# Patient Record
Sex: MALE | URBAN - NONMETROPOLITAN AREA
[De-identification: names, ages, dates, MRNs, and addresses within clinical notes are randomized per-mention and may not be internally consistent; named-entity substitution may affect disease eponyms.]

---

## 2017-01-05 ENCOUNTER — APPOINTMENT (RX ONLY)
Dept: URBAN - NONMETROPOLITAN AREA CLINIC 4 | Facility: CLINIC | Age: 67
Setting detail: DERMATOLOGY
End: 2017-01-05

## 2017-01-05 DIAGNOSIS — L82.1 OTHER SEBORRHEIC KERATOSIS: ICD-10-CM

## 2017-01-05 DIAGNOSIS — B35.3 TINEA PEDIS: ICD-10-CM

## 2017-01-05 DIAGNOSIS — B35.1 TINEA UNGUIUM: ICD-10-CM

## 2017-01-05 DIAGNOSIS — L81.4 OTHER MELANIN HYPERPIGMENTATION: ICD-10-CM

## 2017-01-05 PROBLEM — L57.8 OTHER SKIN CHANGES DUE TO CHRONIC EXPOSURE TO NONIONIZING RADIATION: Status: ACTIVE | Noted: 2017-01-05

## 2017-01-05 PROCEDURE — ? COUNSELING

## 2017-01-05 PROCEDURE — ? PRESCRIPTION

## 2017-01-05 PROCEDURE — ? NAIL CLIPPING FOR PAS

## 2017-01-05 PROCEDURE — ? ORDER TESTS

## 2017-01-05 PROCEDURE — 99213 OFFICE O/P EST LOW 20 MIN: CPT

## 2017-01-05 PROCEDURE — 11755 BIOPSY NAIL UNIT: CPT

## 2017-01-05 RX ORDER — AMMONIUM LACTATE 12 G/100G
CREAM TOPICAL
Qty: 1 | Refills: 0 | Status: ERX | COMMUNITY
Start: 2017-01-05

## 2017-01-05 RX ORDER — KETOCONAZOLE 20 MG/G
CREAM TOPICAL
Qty: 1 | Refills: 0 | Status: ERX | COMMUNITY
Start: 2017-01-05

## 2017-01-05 RX ADMIN — AMMONIUM LACTATE: 12 CREAM TOPICAL at 23:47

## 2017-01-05 RX ADMIN — KETOCONAZOLE: 20 CREAM TOPICAL at 22:52

## 2017-01-05 ASSESSMENT — LOCATION DETAILED DESCRIPTION DERM
LOCATION DETAILED: RIGHT PLANTAR FOREFOOT OVERLYING 3RD METATARSAL
LOCATION DETAILED: LEFT PROXIMAL LATERAL POSTERIOR UPPER ARM
LOCATION DETAILED: RIGHT PROXIMAL DORSAL FOREARM
LOCATION DETAILED: RIGHT POSTERIOR SHOULDER
LOCATION DETAILED: RIGHT PROXIMAL POSTERIOR UPPER ARM
LOCATION DETAILED: LEFT GREAT TOENAIL
LOCATION DETAILED: RIGHT ULNAR DORSAL HAND
LOCATION DETAILED: LEFT MEDIAL PLANTAR MIDFOOT
LOCATION DETAILED: RIGHT DISTAL LATERAL POSTERIOR UPPER ARM
LOCATION DETAILED: RIGHT DISTAL DORSAL FOREARM
LOCATION DETAILED: RIGHT MEDIAL PLANTAR MIDFOOT
LOCATION DETAILED: LEFT VENTRAL PROXIMAL FOREARM
LOCATION DETAILED: LEFT PROXIMAL POSTERIOR UPPER ARM
LOCATION DETAILED: LEFT PROXIMAL DORSAL FOREARM
LOCATION DETAILED: LEFT GREAT TOENAIL
LOCATION DETAILED: LEFT DISTAL DORSAL FOREARM
LOCATION DETAILED: SUPERIOR LUMBAR SPINE
LOCATION DETAILED: LEFT PROXIMAL POSTERIOR UPPER ARM
LOCATION DETAILED: LEFT MEDIAL PLANTAR HEEL
LOCATION DETAILED: RIGHT MEDIAL UPPER BACK
LOCATION DETAILED: LEFT SUPERIOR MEDIAL UPPER BACK
LOCATION DETAILED: LEFT PROXIMAL RADIAL DORSAL FOREARM
LOCATION DETAILED: SUPERIOR LUMBAR SPINE
LOCATION DETAILED: LEFT LATERAL PLANTAR HEEL
LOCATION DETAILED: RIGHT INSTEP
LOCATION DETAILED: RIGHT DISTAL POSTERIOR UPPER ARM
LOCATION DETAILED: LEFT RADIAL DORSAL HAND

## 2017-01-05 ASSESSMENT — LOCATION SIMPLE DESCRIPTION DERM
LOCATION SIMPLE: LEFT UPPER ARM
LOCATION SIMPLE: RIGHT UPPER ARM
LOCATION SIMPLE: RIGHT UPPER BACK
LOCATION SIMPLE: LOWER BACK
LOCATION SIMPLE: LEFT FOREARM
LOCATION SIMPLE: LOWER BACK
LOCATION SIMPLE: LEFT UPPER BACK
LOCATION SIMPLE: LEFT HAND
LOCATION SIMPLE: LEFT GREAT TOE
LOCATION SIMPLE: RIGHT PLANTAR SURFACE
LOCATION SIMPLE: LEFT PLANTAR SURFACE
LOCATION SIMPLE: RIGHT HAND
LOCATION SIMPLE: RIGHT UPPER ARM
LOCATION SIMPLE: LEFT GREAT TOE
LOCATION SIMPLE: LEFT UPPER ARM
LOCATION SIMPLE: RIGHT SHOULDER
LOCATION SIMPLE: RIGHT FOREARM

## 2017-01-05 ASSESSMENT — LOCATION ZONE DERM
LOCATION ZONE: ARM
LOCATION ZONE: TOENAIL
LOCATION ZONE: ARM
LOCATION ZONE: HAND
LOCATION ZONE: TRUNK
LOCATION ZONE: TRUNK
LOCATION ZONE: FEET
LOCATION ZONE: TOENAIL

## 2017-01-05 NOTE — PROCEDURE: NAIL CLIPPING FOR PAS
Add [id number] To Bill?: No
Billing Type: [employer]
Body Location Override (Optional - Billing Will Still Be Based On Selected Body Map Location If Applicable): Left Great ToeNail
Detail Level: Detailed
Lab Facility: [address] [place] [address]
Lab: [id number]

## 2017-01-05 NOTE — PROCEDURE: NAIL CLIPPING FOR PAS
Add [id number] To Bill?: No
Body Location Override (Optional - Billing Will Still Be Based On Selected Body Map Location If Applicable): Left Great ToeNail
Billing Type: [employer]
Lab Facility: [address] [place] [address]
Detail Level: Detailed
Lab: [id number]

## 2017-01-17 ENCOUNTER — APPOINTMENT (RX ONLY)
Dept: URBAN - METROPOLITAN AREA CLINIC 58 | Facility: CLINIC | Age: 67
Setting detail: DERMATOLOGY
End: 2017-01-17

## 2017-01-17 ENCOUNTER — RX ONLY (OUTPATIENT)
Age: 67
Setting detail: RX ONLY
End: 2017-01-17

## 2017-01-17 DIAGNOSIS — B35.1 TINEA UNGUIUM: ICD-10-CM

## 2017-01-17 PROCEDURE — ? PRESCRIPTION

## 2017-01-17 PROCEDURE — 99213 OFFICE O/P EST LOW 20 MIN: CPT

## 2017-01-17 PROCEDURE — ? COUNSELING

## 2017-01-17 RX ORDER — AMOXICILLIN 500 MG/1
CAPSULE ORAL
Qty: 3 | Refills: 0 | Status: ERX | COMMUNITY
Start: 2017-01-17

## 2017-01-17 RX ORDER — TERBINAFINE HYDROCHLORIDE 250 MG/1
TABLET ORAL QD
Qty: 30 | Refills: 0 | Status: ERX | COMMUNITY
Start: 2017-01-17

## 2017-01-17 RX ADMIN — TERBINAFINE HYDROCHLORIDE: 250 TABLET ORAL at 23:07

## 2017-01-17 ASSESSMENT — LOCATION DETAILED DESCRIPTION DERM
LOCATION DETAILED: RIGHT DORSAL GREAT TOE
LOCATION DETAILED: LEFT DORSAL GREAT TOE

## 2017-01-17 ASSESSMENT — LOCATION SIMPLE DESCRIPTION DERM
LOCATION SIMPLE: RIGHT GREAT TOE
LOCATION SIMPLE: LEFT GREAT TOE

## 2017-01-17 ASSESSMENT — LOCATION ZONE DERM: LOCATION ZONE: TOE

## 2017-02-13 ENCOUNTER — RX ONLY (OUTPATIENT)
Age: 67
Setting detail: RX ONLY
End: 2017-02-13

## 2017-02-13 ENCOUNTER — APPOINTMENT (RX ONLY)
Dept: URBAN - NONMETROPOLITAN AREA CLINIC 4 | Facility: CLINIC | Age: 67
Setting detail: DERMATOLOGY
End: 2017-02-13

## 2017-02-13 DIAGNOSIS — B35.1 TINEA UNGUIUM: ICD-10-CM

## 2017-02-13 PROCEDURE — ? COUNSELING

## 2017-02-13 PROCEDURE — 99213 OFFICE O/P EST LOW 20 MIN: CPT

## 2017-02-13 RX ORDER — TERBINAFINE HYDROCHLORIDE 250 MG/1
TABLET ORAL QD
Qty: 30 | Refills: 0 | Status: ERX

## 2017-02-13 ASSESSMENT — LOCATION DETAILED DESCRIPTION DERM
LOCATION DETAILED: RIGHT DORSAL GREAT TOE
LOCATION DETAILED: LEFT GREAT TOENAIL

## 2017-02-13 ASSESSMENT — LOCATION ZONE DERM
LOCATION ZONE: TOE
LOCATION ZONE: TOENAIL

## 2017-02-13 ASSESSMENT — LOCATION SIMPLE DESCRIPTION DERM
LOCATION SIMPLE: LEFT GREAT TOE
LOCATION SIMPLE: RIGHT GREAT TOE

## 2017-03-14 ENCOUNTER — APPOINTMENT (RX ONLY)
Dept: URBAN - NONMETROPOLITAN AREA CLINIC 4 | Facility: CLINIC | Age: 67
Setting detail: DERMATOLOGY
End: 2017-03-14

## 2017-03-14 DIAGNOSIS — B35.1 TINEA UNGUIUM: ICD-10-CM

## 2017-03-14 DIAGNOSIS — D485 NEOPLASM OF UNCERTAIN BEHAVIOR OF SKIN: ICD-10-CM

## 2017-03-14 PROBLEM — D48.5 NEOPLASM OF UNCERTAIN BEHAVIOR OF SKIN: Status: ACTIVE | Noted: 2017-03-14

## 2017-03-14 PROCEDURE — ? COUNSELING

## 2017-03-14 PROCEDURE — 99213 OFFICE O/P EST LOW 20 MIN: CPT | Mod: 25

## 2017-03-14 PROCEDURE — 69100 BIOPSY OF EXTERNAL EAR: CPT

## 2017-03-14 PROCEDURE — ? PRESCRIPTION

## 2017-03-14 PROCEDURE — ? BIOPSY BY SHAVE METHOD

## 2017-03-14 RX ORDER — TERBINAFINE HYDROCHLORIDE 250 MG/1
TABLET ORAL QD
Qty: 30 | Refills: 0 | Status: ERX

## 2017-03-14 ASSESSMENT — LOCATION ZONE DERM
LOCATION ZONE: EAR
LOCATION ZONE: TOENAIL
LOCATION ZONE: EAR

## 2017-03-14 ASSESSMENT — LOCATION DETAILED DESCRIPTION DERM
LOCATION DETAILED: RIGHT SUPERIOR CRUS OF ANTIHELIX
LOCATION DETAILED: LEFT GREAT TOENAIL
LOCATION DETAILED: RIGHT SUPERIOR CRUS OF ANTIHELIX

## 2017-03-14 ASSESSMENT — LOCATION SIMPLE DESCRIPTION DERM
LOCATION SIMPLE: LEFT GREAT TOE
LOCATION SIMPLE: RIGHT EAR
LOCATION SIMPLE: RIGHT EAR

## 2017-03-14 NOTE — PROCEDURE: BIOPSY BY SHAVE METHOD
Path Notes (To The Dermatopathologist): Size: 0.3cm R/O BCC vs SCC
Electrodesiccation And Curettage Text: The wound bed was treated with electrodesiccation and curettage after the biopsy was performed.
Biopsy Type: H and E
Notification Instructions: Patient will be notified of biopsy results. However, patient instructed to call the office if not contacted within 2 weeks.
Billing Type: [employer]
Biopsy Method: 15 blade
Lab: [id number]
Anesthesia Type: 2% lidocaine without epinephrine
Electrodesiccation Text: The wound bed was treated with electrodesiccation after the biopsy was performed.
Bill 60118 For Specimen Handling/Conveyance To Laboratory?: no
Silver Nitrate Text: The wound bed was treated with silver nitrate after the biopsy was performed.
Hemostasis: Electrocautery
Post-Care Instructions: I reviewed with the patient in detail post-care instructions. Patient is to keep the biopsy site dry overnight, and then apply bacitracin twice daily until healed. Patient may apply hydrogen peroxide soaks to remove any crusting.
Consent: Written consent was obtained and risks were reviewed including but not limited to scarring, infection, bleeding, scabbing, incomplete removal, nerve damage and allergy to anesthesia.
Wound Care: Bacitracin
Body Location Override (Optional - Billing Will Still Be Based On Selected Body Map Location If Applicable): Right ear
Additional Anesthesia Volume In Cc (Will Not Render If 0): 0
Cryotherapy Text: The wound bed was treated with cryotherapy after the biopsy was performed.
Lab Facility: [address] [place] [address]
Size Of Lesion In Cm: 0.3
Anesthesia Volume In Cc (Will Not Render If 0): 0.5
Type Of Destruction Used: Curettage
Detail Level: Detailed
Render Post-Care Instructions In Note?: yes
Dressing: bandage

## 2017-04-11 ENCOUNTER — APPOINTMENT (RX ONLY)
Dept: URBAN - NONMETROPOLITAN AREA CLINIC 4 | Facility: CLINIC | Age: 67
Setting detail: DERMATOLOGY
End: 2017-04-11

## 2017-04-11 DIAGNOSIS — B35.1 TINEA UNGUIUM: ICD-10-CM

## 2017-04-11 DIAGNOSIS — L57.0 ACTINIC KERATOSIS: ICD-10-CM

## 2017-04-11 PROCEDURE — ? COUNSELING

## 2017-04-11 PROCEDURE — ? PRESCRIPTION

## 2017-04-11 PROCEDURE — ? LIQUID NITROGEN (CM)

## 2017-04-11 PROCEDURE — 99213 OFFICE O/P EST LOW 20 MIN: CPT | Mod: 25

## 2017-04-11 PROCEDURE — 17000 DESTRUCT PREMALG LESION: CPT

## 2017-04-11 ASSESSMENT — LOCATION ZONE DERM: LOCATION ZONE: EAR

## 2017-04-11 ASSESSMENT — LOCATION SIMPLE DESCRIPTION DERM: LOCATION SIMPLE: RIGHT EAR

## 2017-04-11 ASSESSMENT — LOCATION DETAILED DESCRIPTION DERM: LOCATION DETAILED: RIGHT SUPERIOR CRUS OF ANTIHELIX

## 2017-04-11 NOTE — PROCEDURE: LIQUID NITROGEN
Number Of Freeze-Thaw Cycles: 3 freeze-thaw cycles
Consent: The patient's consent was obtained including but not limited to risks of crusting, scabbing, blistering, scarring, darker or lighter pigmentary change, recurrence, incomplete removal and infection.
Detail Level: Detailed
Duration Of Freeze Thaw-Cycle (Seconds): 1
Post-Care Instructions: I reviewed with the patient in detail post-care instructions. Patient is to wear sunprotection, and avoid picking at any of the treated lesions. Pt may apply Vaseline to crusted or scabbing areas.
X Size Of Lesion In Cm (Optional): 0

## 2017-04-21 RX ORDER — TERBINAFINE HYDROCHLORIDE 250 MG/1
TABLET ORAL QD
Qty: 30 | Refills: 0 | Status: ERX

## 2022-06-29 ENCOUNTER — APPOINTMENT (OUTPATIENT)
Dept: RADIOLOGY | Facility: HOSPITAL | Age: 72
DRG: 982 | End: 2022-06-29
Payer: MEDICARE

## 2022-06-29 ENCOUNTER — HOSPITAL ENCOUNTER (INPATIENT)
Facility: HOSPITAL | Age: 72
LOS: 7 days | Discharge: NURSING, REHAB OR LONG-TERM CARE | DRG: 982 | End: 2022-07-06
Attending: EMERGENCY MEDICINE | Admitting: SURGERY
Payer: MEDICARE

## 2022-06-29 ENCOUNTER — APPOINTMENT (OUTPATIENT)
Dept: CT IMAGING | Facility: HOSPITAL | Age: 72
DRG: 982 | End: 2022-06-29
Payer: MEDICARE

## 2022-06-29 DIAGNOSIS — S42.123A CLOSED FRACTURE OF ACROMION: ICD-10-CM

## 2022-06-29 DIAGNOSIS — S22.49XA MULTIPLE RIB FRACTURES: Primary | ICD-10-CM

## 2022-06-29 DIAGNOSIS — S42.109A SCAPULA FRACTURE: ICD-10-CM

## 2022-06-29 DIAGNOSIS — S42.002A CLOSED DISPLACED FRACTURE OF LEFT CLAVICLE: ICD-10-CM

## 2022-06-29 LAB
ABO GROUP (TYPE) IN BLOOD: NORMAL
ALBUMIN SERPL-MCNC: 3.9 G/DL (ref 3.5–5.3)
ALP SERPL-CCNC: 70 U/L (ref 45–115)
ALT SERPL-CCNC: 19 U/L (ref 7–52)
ANION GAP SERPL CALC-SCNC: 8 MMOL/L (ref 3–11)
ANTIBODY SCREEN: NORMAL
AST SERPL-CCNC: 32 U/L
BACTERIA #/AREA URNS AUTO: ABNORMAL /HPF
BASOPHILS # BLD AUTO: 0.1 10*3/UL
BASOPHILS NFR BLD AUTO: 0.5 % (ref 0–2)
BILIRUB SERPL-MCNC: 0.56 MG/DL (ref 0.2–1.4)
BILIRUB UR QL STRIP.AUTO: NEGATIVE
BUN SERPL-MCNC: 20 MG/DL (ref 7–25)
CALCIUM ALBUM COR SERPL-MCNC: 8.9 MG/DL (ref 8.6–10.3)
CALCIUM SERPL-MCNC: 8.8 MG/DL (ref 8.6–10.3)
CHLORIDE SERPL-SCNC: 103 MMOL/L (ref 98–107)
CLARITY UR: CLEAR
CO2 SERPL-SCNC: 24 MMOL/L (ref 21–32)
COLOR UR: YELLOW
CREAT SERPL-MCNC: 1.33 MG/DL (ref 0.7–1.3)
D AG BLD QL: NORMAL
EOSINOPHIL # BLD AUTO: 0.1 10*3/UL
EOSINOPHIL NFR BLD AUTO: 0.3 % (ref 0–3)
ERYTHROCYTE [DISTWIDTH] IN BLOOD BY AUTOMATED COUNT: 15.1 % (ref 11.5–15)
GFR SERPL CREATININE-BSD FRML MDRD: 56 ML/MIN/1.73M*2
GLUCOSE SERPL-MCNC: 151 MG/DL (ref 70–105)
GLUCOSE UR STRIP.AUTO-MCNC: NEGATIVE MG/DL
HCT VFR BLD AUTO: 40.4 % (ref 38–50)
HGB BLD-MCNC: 13.5 G/DL (ref 13.2–17.2)
HGB UR QL STRIP.AUTO: NEGATIVE
KETONES UR STRIP.AUTO-MCNC: ABNORMAL MG/DL
LEUKOCYTE ESTERASE UR QL STRIP: NEGATIVE
LIPASE SERPL-CCNC: 32 U/L (ref 11–82)
LYMPHOCYTES # BLD AUTO: 0.6 10*3/UL
LYMPHOCYTES NFR BLD AUTO: 3.7 % (ref 15–47)
MAGNESIUM SERPL-MCNC: 2 MG/DL (ref 1.8–2.4)
MCH RBC QN AUTO: 27.3 PG (ref 29–34)
MCHC RBC AUTO-ENTMCNC: 33.4 G/DL (ref 32–36)
MCV RBC AUTO: 81.7 FL (ref 82–97)
MONOCYTES # BLD AUTO: 1.2 10*3/UL
MONOCYTES NFR BLD AUTO: 7.4 % (ref 5–13)
NEUTROPHILS # BLD AUTO: 14.2 10*3/UL
NEUTROPHILS NFR BLD AUTO: 88.1 % (ref 46–70)
NITRITE UR QL STRIP.AUTO: NEGATIVE
PH UR STRIP.AUTO: 5 PH
PLATELET # BLD AUTO: 178 10*3/UL (ref 130–350)
PMV BLD AUTO: 7.3 FL (ref 6.9–10.8)
POTASSIUM SERPL-SCNC: 4.6 MMOL/L (ref 3.5–5.1)
PROT SERPL-MCNC: 6.6 G/DL (ref 6–8.3)
PROT UR STRIP.AUTO-MCNC: 30 MG/DL
RBC # BLD AUTO: 4.95 10*6/ΜL (ref 4.1–5.8)
RBC #/AREA URNS AUTO: ABNORMAL /HPF
SECOND/CONFIRMATORY ABORH PERFORMED: NORMAL
SODIUM SERPL-SCNC: 135 MMOL/L (ref 135–145)
SP GR UR STRIP.AUTO: 1.06 (ref 1–1.03)
SQUAMOUS #/AREA URNS AUTO: ABNORMAL /HPF
TROPONIN I SERPL-MCNC: 7.5 PG/ML
UROBILINOGEN UR STRIP.AUTO-MCNC: <2 E.U./DL
WBC # BLD AUTO: 16.1 10*3/UL (ref 3.7–9.6)
WBC #/AREA URNS AUTO: ABNORMAL /HPF

## 2022-06-29 PROCEDURE — 73000 X-RAY EXAM OF COLLAR BONE: CPT | Mod: LT

## 2022-06-29 PROCEDURE — 6370000100 HC RX 637 (ALT 250 FOR IP): Performed by: SURGERY

## 2022-06-29 PROCEDURE — 70450 CT HEAD/BRAIN W/O DYE: CPT | Mod: MG

## 2022-06-29 PROCEDURE — 96374 THER/PROPH/DIAG INJ IV PUSH: CPT

## 2022-06-29 PROCEDURE — 2550000100 HC RX 255: Performed by: EMERGENCY MEDICINE

## 2022-06-29 PROCEDURE — 99222 1ST HOSP IP/OBS MODERATE 55: CPT | Performed by: SURGERY

## 2022-06-29 PROCEDURE — 6360000200 HC RX 636 W HCPCS (ALT 250 FOR IP): Performed by: NURSE PRACTITIONER

## 2022-06-29 PROCEDURE — 96376 TX/PRO/DX INJ SAME DRUG ADON: CPT

## 2022-06-29 PROCEDURE — 73080 X-RAY EXAM OF ELBOW: CPT | Mod: LT

## 2022-06-29 PROCEDURE — 83690 ASSAY OF LIPASE: CPT | Performed by: EMERGENCY MEDICINE

## 2022-06-29 PROCEDURE — 85025 COMPLETE CBC W/AUTO DIFF WBC: CPT | Performed by: EMERGENCY MEDICINE

## 2022-06-29 PROCEDURE — 6360000200 HC RX 636 W HCPCS (ALT 250 FOR IP): Performed by: EMERGENCY MEDICINE

## 2022-06-29 PROCEDURE — 83735 ASSAY OF MAGNESIUM: CPT | Performed by: NURSE PRACTITIONER

## 2022-06-29 PROCEDURE — 80053 COMPREHEN METABOLIC PANEL: CPT | Performed by: EMERGENCY MEDICINE

## 2022-06-29 PROCEDURE — 99291 CRITICAL CARE FIRST HOUR: CPT | Performed by: EMERGENCY MEDICINE

## 2022-06-29 PROCEDURE — 84100 ASSAY OF PHOSPHORUS: CPT | Performed by: NURSE PRACTITIONER

## 2022-06-29 PROCEDURE — G1004 CDSM NDSC: HCPCS

## 2022-06-29 PROCEDURE — 81001 URINALYSIS AUTO W/SCOPE: CPT | Performed by: EMERGENCY MEDICINE

## 2022-06-29 PROCEDURE — 2580000300 HC RX 258: Performed by: NURSE PRACTITIONER

## 2022-06-29 PROCEDURE — 83735 ASSAY OF MAGNESIUM: CPT | Performed by: EMERGENCY MEDICINE

## 2022-06-29 PROCEDURE — 36415 COLL VENOUS BLD VENIPUNCTURE: CPT | Performed by: EMERGENCY MEDICINE

## 2022-06-29 PROCEDURE — 71045 X-RAY EXAM CHEST 1 VIEW: CPT

## 2022-06-29 PROCEDURE — 73060 X-RAY EXAM OF HUMERUS: CPT | Mod: LT

## 2022-06-29 PROCEDURE — 84484 ASSAY OF TROPONIN QUANT: CPT | Performed by: EMERGENCY MEDICINE

## 2022-06-29 PROCEDURE — 6370000100 HC RX 637 (ALT 250 FOR IP): Performed by: NURSE PRACTITIONER

## 2022-06-29 PROCEDURE — 74177 CT ABD & PELVIS W/CONTRAST: CPT | Mod: MG

## 2022-06-29 PROCEDURE — 2590000100 HC RX 259: Performed by: NURSE PRACTITIONER

## 2022-06-29 PROCEDURE — 94660 CPAP INITIATION&MGMT: CPT

## 2022-06-29 PROCEDURE — 86885 COOMBS TEST INDIRECT QUAL: CPT

## 2022-06-29 PROCEDURE — (BLANK) HC ROOM PRIVATE

## 2022-06-29 RX ORDER — ONDANSETRON HYDROCHLORIDE 2 MG/ML
4 INJECTION, SOLUTION INTRAVENOUS EVERY 6 HOURS PRN
Status: DISCONTINUED | OUTPATIENT
Start: 2022-06-29 | End: 2022-07-06 | Stop reason: HOSPADM

## 2022-06-29 RX ORDER — TADALAFIL 10 MG/1
10 TABLET ORAL DAILY PRN
COMMUNITY
Start: 2022-03-16

## 2022-06-29 RX ORDER — IBUPROFEN 600 MG/1
600 TABLET ORAL EVERY 6 HOURS
Status: CANCELLED | OUTPATIENT
Start: 2022-06-29

## 2022-06-29 RX ORDER — TESTOSTERONE ENANTHATE 200 MG/ML
200 VIAL (ML) INTRAMUSCULAR
COMMUNITY

## 2022-06-29 RX ORDER — FLUTICASONE PROPIONATE 50 MCG
2 SPRAY, SUSPENSION (ML) NASAL DAILY
COMMUNITY
Start: 2021-09-22

## 2022-06-29 RX ORDER — ALUMINUM HYDROXIDE, MAGNESIUM HYDROXIDE, AND SIMETHICONE 1200; 120; 1200 MG/30ML; MG/30ML; MG/30ML
30 SUSPENSION ORAL 3 TIMES DAILY PRN
Status: DISCONTINUED | OUTPATIENT
Start: 2022-06-29 | End: 2022-07-06 | Stop reason: HOSPADM

## 2022-06-29 RX ORDER — ONDANSETRON 4 MG/1
4 TABLET, FILM COATED ORAL EVERY 6 HOURS PRN
Status: DISCONTINUED | OUTPATIENT
Start: 2022-06-29 | End: 2022-07-06 | Stop reason: HOSPADM

## 2022-06-29 RX ORDER — MELOXICAM 15 MG/1
15 TABLET ORAL DAILY
COMMUNITY
Start: 2022-04-11 | End: 2022-07-06 | Stop reason: HOSPADM

## 2022-06-29 RX ORDER — ENOXAPARIN SODIUM 100 MG/ML
40 INJECTION SUBCUTANEOUS
Status: DISCONTINUED | OUTPATIENT
Start: 2022-06-29 | End: 2022-07-06 | Stop reason: HOSPADM

## 2022-06-29 RX ORDER — ACETAMINOPHEN 325 MG/1
325-650 TABLET ORAL EVERY 4 HOURS PRN
Status: DISCONTINUED | OUTPATIENT
Start: 2022-06-29 | End: 2022-07-06 | Stop reason: HOSPADM

## 2022-06-29 RX ORDER — TRAZODONE HYDROCHLORIDE 100 MG/1
100 TABLET ORAL
COMMUNITY
Start: 2022-05-04

## 2022-06-29 RX ORDER — DEXTROSE MONOHYDRATE, SODIUM CHLORIDE, AND POTASSIUM CHLORIDE 50; 1.49; 9 G/1000ML; G/1000ML; G/1000ML
100 INJECTION, SOLUTION INTRAVENOUS CONTINUOUS
Status: DISCONTINUED | OUTPATIENT
Start: 2022-06-29 | End: 2022-07-01

## 2022-06-29 RX ORDER — OXYCODONE HYDROCHLORIDE 5 MG/1
5-10 TABLET ORAL EVERY 4 HOURS PRN
Status: DISCONTINUED | OUTPATIENT
Start: 2022-06-29 | End: 2022-07-06 | Stop reason: HOSPADM

## 2022-06-29 RX ORDER — POLYETHYLENE GLYCOL (3350) 17 G/17G
17 POWDER, FOR SOLUTION ORAL DAILY
Status: DISCONTINUED | OUTPATIENT
Start: 2022-06-29 | End: 2022-07-06 | Stop reason: HOSPADM

## 2022-06-29 RX ORDER — BUPROPION HYDROCHLORIDE 150 MG/1
150 TABLET ORAL
COMMUNITY
Start: 2021-06-29

## 2022-06-29 RX ORDER — SODIUM CHLORIDE 0.9 % (FLUSH) 0.9 %
3 SYRINGE (ML) INJECTION AS NEEDED
Status: DISCONTINUED | OUTPATIENT
Start: 2022-06-29 | End: 2022-07-06 | Stop reason: HOSPADM

## 2022-06-29 RX ORDER — ATORVASTATIN CALCIUM 20 MG/1
0.5 TABLET, FILM COATED ORAL NIGHTLY
COMMUNITY
Start: 2021-06-29

## 2022-06-29 RX ORDER — HYDROMORPHONE HYDROCHLORIDE 1 MG/ML
.4-.6 INJECTION, SOLUTION INTRAMUSCULAR; INTRAVENOUS; SUBCUTANEOUS
Status: DISCONTINUED | OUTPATIENT
Start: 2022-06-29 | End: 2022-07-06 | Stop reason: HOSPADM

## 2022-06-29 RX ORDER — IBUPROFEN 600 MG/1
600 TABLET ORAL
Status: DISCONTINUED | OUTPATIENT
Start: 2022-06-29 | End: 2022-07-06 | Stop reason: HOSPADM

## 2022-06-29 RX ORDER — SPIRONOLACTONE 50 MG/1
50 TABLET, FILM COATED ORAL DAILY
COMMUNITY
Start: 2022-06-17

## 2022-06-29 RX ORDER — IOPAMIDOL 755 MG/ML
115 INJECTION, SOLUTION INTRAVASCULAR ONCE
Status: COMPLETED | OUTPATIENT
Start: 2022-06-29 | End: 2022-06-29

## 2022-06-29 RX ORDER — OMEPRAZOLE 40 MG/1
40 CAPSULE, DELAYED RELEASE ORAL DAILY
COMMUNITY
Start: 2022-06-16

## 2022-06-29 RX ORDER — AMOXICILLIN 250 MG
1 CAPSULE ORAL 2 TIMES DAILY
Status: DISCONTINUED | OUTPATIENT
Start: 2022-06-29 | End: 2022-07-06 | Stop reason: HOSPADM

## 2022-06-29 RX ORDER — ZOLMITRIPTAN 5 MG/1
5 TABLET, FILM COATED ORAL ONCE AS NEEDED
COMMUNITY
Start: 2021-09-22

## 2022-06-29 RX ORDER — DICYCLOMINE HYDROCHLORIDE 10 MG/1
20 CAPSULE ORAL 3 TIMES DAILY
COMMUNITY
Start: 2022-06-08

## 2022-06-29 RX ORDER — PRAZOSIN HYDROCHLORIDE 2 MG/1
6 CAPSULE ORAL NIGHTLY
COMMUNITY
Start: 2021-06-29

## 2022-06-29 RX ORDER — VENLAFAXINE HYDROCHLORIDE 75 MG/1
225 CAPSULE, EXTENDED RELEASE ORAL DAILY
COMMUNITY
Start: 2021-06-29

## 2022-06-29 RX ORDER — LIDOCAINE 50 MG/G
2 PATCH TOPICAL DAILY
Status: DISCONTINUED | OUTPATIENT
Start: 2022-06-29 | End: 2022-07-06 | Stop reason: HOSPADM

## 2022-06-29 RX ORDER — GABAPENTIN 300 MG/1
300 CAPSULE ORAL 3 TIMES DAILY
Status: DISCONTINUED | OUTPATIENT
Start: 2022-06-29 | End: 2022-07-06 | Stop reason: HOSPADM

## 2022-06-29 RX ORDER — FENTANYL CITRATE/PF 50 MCG/ML
50 PLASTIC BAG, INJECTION (ML) INTRAVENOUS
Status: DISCONTINUED | OUTPATIENT
Start: 2022-06-29 | End: 2022-07-06 | Stop reason: HOSPADM

## 2022-06-29 RX ORDER — TRAZODONE HYDROCHLORIDE 100 MG/1
100 TABLET ORAL NIGHTLY
Status: DISCONTINUED | OUTPATIENT
Start: 2022-06-29 | End: 2022-07-01

## 2022-06-29 RX ORDER — CYCLOBENZAPRINE HCL 10 MG
10 TABLET ORAL 3 TIMES DAILY
Status: DISCONTINUED | OUTPATIENT
Start: 2022-06-29 | End: 2022-07-03

## 2022-06-29 RX ORDER — ATENOLOL 50 MG/1
50 TABLET ORAL DAILY
COMMUNITY
Start: 2022-01-03

## 2022-06-29 RX ORDER — MUPIROCIN 20 MG/G
1 OINTMENT TOPICAL 2 TIMES DAILY
COMMUNITY
Start: 2022-06-08 | End: 2022-07-12 | Stop reason: HOSPADM

## 2022-06-29 RX ADMIN — GABAPENTIN 300 MG: 300 CAPSULE ORAL at 14:18

## 2022-06-29 RX ADMIN — POTASSIUM CHLORIDE, DEXTROSE MONOHYDRATE AND SODIUM CHLORIDE 100 ML/HR: 150; 5; 900 INJECTION, SOLUTION INTRAVENOUS at 21:57

## 2022-06-29 RX ADMIN — LIDOCAINE 2 PATCH: 50 PATCH TOPICAL at 14:18

## 2022-06-29 RX ADMIN — FENTANYL CITRATE 50 MCG: 0.05 INJECTION, SOLUTION INTRAMUSCULAR; INTRAVENOUS at 10:50

## 2022-06-29 RX ADMIN — OXYCODONE HYDROCHLORIDE 10 MG: 5 TABLET ORAL at 14:18

## 2022-06-29 RX ADMIN — ONDANSETRON 4 MG: 2 INJECTION INTRAMUSCULAR; INTRAVENOUS at 10:14

## 2022-06-29 RX ADMIN — TRAZODONE HYDROCHLORIDE 100 MG: 100 TABLET ORAL at 21:14

## 2022-06-29 RX ADMIN — SENNOSIDES AND DOCUSATE SODIUM 1 TABLET: 50; 8.6 TABLET ORAL at 14:18

## 2022-06-29 RX ADMIN — CYCLOBENZAPRINE 10 MG: 10 TABLET, FILM COATED ORAL at 21:14

## 2022-06-29 RX ADMIN — ACETAMINOPHEN 650 MG: 325 TABLET ORAL at 19:41

## 2022-06-29 RX ADMIN — IOPAMIDOL 115 ML: 755 INJECTION, SOLUTION INTRAVENOUS at 10:35

## 2022-06-29 RX ADMIN — GABAPENTIN 300 MG: 300 CAPSULE ORAL at 21:14

## 2022-06-29 RX ADMIN — POTASSIUM CHLORIDE, DEXTROSE MONOHYDRATE AND SODIUM CHLORIDE 100 ML/HR: 150; 5; 900 INJECTION, SOLUTION INTRAVENOUS at 10:44

## 2022-06-29 RX ADMIN — OXYCODONE HYDROCHLORIDE 10 MG: 5 TABLET ORAL at 17:40

## 2022-06-29 RX ADMIN — HYDROMORPHONE HYDROCHLORIDE 0.5 MG: 1 INJECTION, SOLUTION INTRAMUSCULAR; INTRAVENOUS; SUBCUTANEOUS at 10:15

## 2022-06-29 RX ADMIN — CYCLOBENZAPRINE 10 MG: 10 TABLET, FILM COATED ORAL at 14:18

## 2022-06-29 RX ADMIN — IBUPROFEN 600 MG: 600 TABLET ORAL at 17:40

## 2022-06-29 RX ADMIN — FENTANYL CITRATE 50 MCG: 0.05 INJECTION, SOLUTION INTRAMUSCULAR; INTRAVENOUS at 21:15

## 2022-06-29 RX ADMIN — FENTANYL CITRATE 50 MCG: 0.05 INJECTION, SOLUTION INTRAMUSCULAR; INTRAVENOUS at 12:48

## 2022-06-29 RX ADMIN — SENNOSIDES AND DOCUSATE SODIUM 1 TABLET: 50; 8.6 TABLET ORAL at 21:14

## 2022-06-29 RX ADMIN — IBUPROFEN 600 MG: 600 TABLET ORAL at 21:14

## 2022-06-29 ASSESSMENT — ENCOUNTER SYMPTOMS
CHEST TIGHTNESS: 1
SHORTNESS OF BREATH: 1
ARTHRALGIAS: 1
NUMBNESS: 0
DIAPHORESIS: 1
HEADACHES: 0
CONFUSION: 0
FEVER: 0

## 2022-06-29 ASSESSMENT — ACTIVITIES OF DAILY LIVING (ADL)
ASSISTIVE_DEVICE: EYEGLASSES
PATIENT'S MEMORY ADEQUATE TO SAFELY COMPLETE DAILY ACTIVITIES?: YES
ADEQUATE_TO_COMPLETE_ADL: YES
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
## 2022-06-29 NOTE — PLAN OF CARE
Problem: Neurosensory - Adult  Goal: Achieves stable or improved neurological status  Description: INTERVENTIONS  1. Assess for and report changes in neurological status  2. Initiate measures to prevent increased intracranial pressure  3. Maintain blood pressure and fluid volume within ordered parameters to optimize cerebral perfusion and minimize risk of hemorrhage  4. Monitor temperature, glucose, and sodium. Initiate appropriate interventions as ordered  Flowsheets (Taken 6/29/2022 1222)  Achieves stable or improved neurological status:   Assess for and report changes in neurological status   Initiate measures to prevent increased intracranial pressure   Maintain blood pressure and fluid volume within ordered parameters to optimize cerebral perfusion and minimize risk of hemorrhage   Monitor temperature, glucose, and sodium. Initiate appropriate interventions as ordered  Goal: Achieves maximal functionality and self care  Description: INTERVENTIONS  1. Monitor swallowing and airway patency with patient fatigue and changes in neurological status  2. Encourage and assist patient to increase activity and self care with guidance from PT/OT  3. Encourage visually impaired, hearing impaired and aphasic patients to use assistive/communication devices  Outcome: Progressing  Flowsheets (Taken 6/29/2022 1222)  Achieves maximal functionality and self care:   Monitor swallowing and airway patency with patient fatigue and changes in neurological status   Encourage and assist patient to increase activity and self care with guidance from PT/OT   Encourage visually impaired, hearing impaired and aphasic patients to use assistive/communication devices
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
## 2022-06-29 NOTE — MEDICATION HISTORY SPECIALIST NOTES
[facility] ED-[id number]    CSN: [id number]  : [id number]    Information sources:  EPIC  Complete Dispense Report  Go Reconcile  [facility]    Allergies verified.    Patient interviewed in person who provided all history. Patient verified medications and provided last doses. Verified with [facility].    New medications added:  All    Profile was checked for  medications. Reviewed previously removed medication history section for removed medications and reasoning. If applicable reasoning will be listed in discrepancies.     Discrepancies:  Pt states not taking meloxicam     See  for medication resources.  Outside records permanent

## 2022-06-30 ENCOUNTER — APPOINTMENT (OUTPATIENT)
Dept: FLUOROSCOPY | Facility: HOSPITAL | Age: 72
DRG: 982 | End: 2022-06-30
Payer: MEDICARE

## 2022-06-30 ENCOUNTER — ANESTHESIA EVENT (OUTPATIENT)
Dept: OPERATING ROOM | Facility: HOSPITAL | Age: 72
DRG: 982 | End: 2022-06-30
Payer: MEDICARE

## 2022-06-30 ENCOUNTER — APPOINTMENT (OUTPATIENT)
Dept: RADIOLOGY | Facility: HOSPITAL | Age: 72
DRG: 982 | End: 2022-06-30
Payer: MEDICARE

## 2022-06-30 ENCOUNTER — ANESTHESIA (OUTPATIENT)
Dept: OPERATING ROOM | Facility: HOSPITAL | Age: 72
DRG: 982 | End: 2022-06-30
Payer: MEDICARE

## 2022-06-30 LAB
ANION GAP SERPL CALC-SCNC: 8 MMOL/L (ref 3–11)
BASOPHILS # BLD AUTO: 0 10*3/UL
BASOPHILS NFR BLD AUTO: 0.2 % (ref 0–2)
BUN SERPL-MCNC: 22 MG/DL (ref 7–25)
CALCIUM SERPL-MCNC: 8.6 MG/DL (ref 8.6–10.3)
CHLORIDE SERPL-SCNC: 107 MMOL/L (ref 98–107)
CO2 SERPL-SCNC: 26 MMOL/L (ref 21–32)
CREAT SERPL-MCNC: 1.42 MG/DL (ref 0.7–1.3)
EOSINOPHIL # BLD AUTO: 0.1 10*3/UL
EOSINOPHIL NFR BLD AUTO: 1.1 % (ref 0–3)
ERYTHROCYTE [DISTWIDTH] IN BLOOD BY AUTOMATED COUNT: 15.2 % (ref 11.5–15)
GFR SERPL CREATININE-BSD FRML MDRD: 52 ML/MIN/1.73M*2
GLUCOSE SERPL-MCNC: 130 MG/DL (ref 70–105)
HCT VFR BLD AUTO: 39.3 % (ref 38–50)
HGB BLD-MCNC: 12.9 G/DL (ref 13.2–17.2)
LYMPHOCYTES # BLD AUTO: 1.1 10*3/UL
LYMPHOCYTES NFR BLD AUTO: 13 % (ref 15–47)
MAGNESIUM SERPL-MCNC: 2.1 MG/DL (ref 1.8–2.4)
MAGNESIUM SERPL-MCNC: 2.2 MG/DL (ref 1.8–2.4)
MCH RBC QN AUTO: 27.4 PG (ref 29–34)
MCHC RBC AUTO-ENTMCNC: 33 G/DL (ref 32–36)
MCV RBC AUTO: 83.2 FL (ref 82–97)
MONOCYTES # BLD AUTO: 1.3 10*3/UL
MONOCYTES NFR BLD AUTO: 15.7 % (ref 5–13)
NEUTROPHILS # BLD AUTO: 5.9 10*3/UL
NEUTROPHILS NFR BLD AUTO: 70 % (ref 46–70)
PHOSPHATE SERPL-MCNC: 3.5 MG/DL (ref 2.5–4.9)
PLATELET # BLD AUTO: 157 10*3/UL (ref 130–350)
PMV BLD AUTO: 7.2 FL (ref 6.9–10.8)
POTASSIUM SERPL-SCNC: 4.4 MMOL/L (ref 3.5–5.1)
RBC # BLD AUTO: 4.72 10*6/ΜL (ref 4.1–5.8)
SODIUM SERPL-SCNC: 141 MMOL/L (ref 135–145)
WBC # BLD AUTO: 8.4 10*3/UL (ref 3.7–9.6)

## 2022-06-30 PROCEDURE — (BLANK) HC GENERAL ANESTHESIA FACILITY CHARGE 1ST 15 MIN: Performed by: ORTHOPAEDIC SURGERY

## 2022-06-30 PROCEDURE — 85025 COMPLETE CBC W/AUTO DIFF WBC: CPT | Performed by: NURSE PRACTITIONER

## 2022-06-30 PROCEDURE — 71045 X-RAY EXAM CHEST 1 VIEW: CPT

## 2022-06-30 PROCEDURE — (BLANK) HC OR LEVEL 3 PROC EACH ADDITIONAL MIN: Performed by: ORTHOPAEDIC SURGERY

## 2022-06-30 PROCEDURE — 2590000100 HC RX 259: Performed by: NURSE PRACTITIONER

## 2022-06-30 PROCEDURE — 0PSB04Z REPOSITION LEFT CLAVICLE WITH INTERNAL FIXATION DEVICE, OPEN APPROACH: ICD-10-PCS | Performed by: ORTHOPAEDIC SURGERY

## 2022-06-30 PROCEDURE — 2580000300 HC RX 258: Performed by: NURSE PRACTITIONER

## 2022-06-30 PROCEDURE — 36415 COLL VENOUS BLD VENIPUNCTURE: CPT | Performed by: NURSE PRACTITIONER

## 2022-06-30 PROCEDURE — (BLANK) HC OR LEVEL 3 PROC 1ST 15MIN: Performed by: ORTHOPAEDIC SURGERY

## 2022-06-30 PROCEDURE — 6360000200 HC RX 636 W HCPCS (ALT 250 FOR IP): Performed by: PHYSICIAN ASSISTANT

## 2022-06-30 PROCEDURE — 6360000200 HC RX 636 W HCPCS (ALT 250 FOR IP): Performed by: ANESTHESIOLOGY

## 2022-06-30 PROCEDURE — 6360000200 HC RX 636 W HCPCS (ALT 250 FOR IP): Performed by: EMERGENCY MEDICINE

## 2022-06-30 PROCEDURE — 2500000200 HC RX 250 WO HCPCS: Performed by: ORTHOPAEDIC SURGERY

## 2022-06-30 PROCEDURE — 73000 X-RAY EXAM OF COLLAR BONE: CPT | Mod: LT

## 2022-06-30 PROCEDURE — 2580000300 HC RX 258: Performed by: PHYSICIAN ASSISTANT

## 2022-06-30 PROCEDURE — 6360000200 HC RX 636 W HCPCS (ALT 250 FOR IP): Performed by: NURSE ANESTHETIST, CERTIFIED REGISTERED

## 2022-06-30 PROCEDURE — 99100 ANES PT EXTEME AGE<1 YR&>70: CPT | Performed by: NURSE ANESTHETIST, CERTIFIED REGISTERED

## 2022-06-30 PROCEDURE — 2500000200 HC RX 250 WO HCPCS: Performed by: NURSE ANESTHETIST, CERTIFIED REGISTERED

## 2022-06-30 PROCEDURE — (BLANK) HC GENERAL ANESTHESIA FACILITY CHARGE EACH ADDITIONAL MIN: Performed by: ORTHOPAEDIC SURGERY

## 2022-06-30 PROCEDURE — 76000 FLUOROSCOPY <1 HR PHYS/QHP: CPT | Mod: NO READ

## 2022-06-30 PROCEDURE — 99232 SBSQ HOSP IP/OBS MODERATE 35: CPT | Performed by: NURSE PRACTITIONER

## 2022-06-30 PROCEDURE — 6370000100 HC RX 637 (ALT 250 FOR IP): Performed by: NURSE PRACTITIONER

## 2022-06-30 PROCEDURE — 94660 CPAP INITIATION&MGMT: CPT

## 2022-06-30 PROCEDURE — 00450 ANES PX CLAV&SCAPULA NOS: CPT | Performed by: NURSE ANESTHETIST, CERTIFIED REGISTERED

## 2022-06-30 PROCEDURE — 83735 ASSAY OF MAGNESIUM: CPT | Performed by: NURSE PRACTITIONER

## 2022-06-30 PROCEDURE — 80048 BASIC METABOLIC PNL TOTAL CA: CPT | Performed by: NURSE PRACTITIONER

## 2022-06-30 PROCEDURE — 2580000300 HC RX 258: Performed by: ANESTHESIOLOGY

## 2022-06-30 PROCEDURE — 2720000000 HC SUPP 272 WO HCPCS: Performed by: ORTHOPAEDIC SURGERY

## 2022-06-30 PROCEDURE — (BLANK) HC ROOM PRIVATE

## 2022-06-30 PROCEDURE — C1713 ANCHOR/SCREW BN/BN,TIS/BN: HCPCS | Performed by: ORTHOPAEDIC SURGERY

## 2022-06-30 PROCEDURE — (BLANK) HC RECOVERY PHASE-1 1ST  HOUR ACUITY LEVEL 3: Performed by: ORTHOPAEDIC SURGERY

## 2022-06-30 PROCEDURE — 6370000100 HC RX 637 (ALT 250 FOR IP): Performed by: SURGERY

## 2022-06-30 PROCEDURE — 2500000200 HC RX 250 WO HCPCS: Performed by: ANESTHESIOLOGY

## 2022-06-30 DEVICE — SCREW VA 2.7X18MM LCK SLF TAP T8 STARDRIVE RECESS: Type: IMPLANTABLE DEVICE | Site: CLAVICLE | Status: FUNCTIONAL

## 2022-06-30 DEVICE — IMPLANTABLE DEVICE: Type: IMPLANTABLE DEVICE | Site: CLAVICLE | Status: FUNCTIONAL

## 2022-06-30 DEVICE — SCREW VA 2.7X14MM LCK SLF TAP: Type: IMPLANTABLE DEVICE | Site: CLAVICLE | Status: FUNCTIONAL

## 2022-06-30 DEVICE — SCREW METAPHYSEAL 2.7X16MM ST SLF TAP T8 STARDRIVE RECESS: Type: IMPLANTABLE DEVICE | Site: CLAVICLE | Status: FUNCTIONAL

## 2022-06-30 DEVICE — SCREW VA 2.7X16MM LCK SLF TAP: Type: IMPLANTABLE DEVICE | Site: CLAVICLE | Status: FUNCTIONAL

## 2022-06-30 DEVICE — SCREW METAPHYSEAL 2.7X14MM ST SLF TAP T8 STARDRIVE RECESS: Type: IMPLANTABLE DEVICE | Site: CLAVICLE | Status: FUNCTIONAL

## 2022-06-30 RX ORDER — NEOSTIGMINE METHYLSULFATE 1 MG/ML
INJECTION INTRAVENOUS AS NEEDED
Status: DISCONTINUED | OUTPATIENT
Start: 2022-06-30 | End: 2022-06-30 | Stop reason: SURG

## 2022-06-30 RX ORDER — FENTANYL CITRATE/PF 50 MCG/ML
PLASTIC BAG, INJECTION (ML) INTRAVENOUS AS NEEDED
Status: DISCONTINUED | OUTPATIENT
Start: 2022-06-30 | End: 2022-06-30 | Stop reason: SURG

## 2022-06-30 RX ORDER — DEXAMETHASONE SODIUM PHOSPHATE 4 MG/ML
4 INJECTION, SOLUTION INTRA-ARTICULAR; INTRALESIONAL; INTRAMUSCULAR; INTRAVENOUS; SOFT TISSUE ONCE AS NEEDED
Status: DISCONTINUED | OUTPATIENT
Start: 2022-06-30 | End: 2022-06-30 | Stop reason: HOSPADM

## 2022-06-30 RX ORDER — SODIUM CHLORIDE, SODIUM LACTATE, POTASSIUM CHLORIDE, CALCIUM CHLORIDE 600; 310; 30; 20 MG/100ML; MG/100ML; MG/100ML; MG/100ML
100 INJECTION, SOLUTION INTRAVENOUS CONTINUOUS
Status: DISCONTINUED | OUTPATIENT
Start: 2022-06-30 | End: 2022-07-01

## 2022-06-30 RX ORDER — GLYCOPYRROLATE 0.2 MG/ML
INJECTION INTRAMUSCULAR; INTRAVENOUS AS NEEDED
Status: DISCONTINUED | OUTPATIENT
Start: 2022-06-30 | End: 2022-06-30 | Stop reason: SURG

## 2022-06-30 RX ORDER — ROCURONIUM BROMIDE 10 MG/ML
INJECTION, SOLUTION INTRAVENOUS AS NEEDED
Status: DISCONTINUED | OUTPATIENT
Start: 2022-06-30 | End: 2022-06-30 | Stop reason: SURG

## 2022-06-30 RX ORDER — KETAMINE HCL IN 0.9 % NACL 50 MG/5 ML
SYRINGE (ML) INTRAVENOUS AS NEEDED
Status: DISCONTINUED | OUTPATIENT
Start: 2022-06-30 | End: 2022-06-30 | Stop reason: SURG

## 2022-06-30 RX ORDER — PROPOFOL 10 MG/ML
INJECTION, EMULSION INTRAVENOUS AS NEEDED
Status: DISCONTINUED | OUTPATIENT
Start: 2022-06-30 | End: 2022-06-30 | Stop reason: SURG

## 2022-06-30 RX ORDER — METOPROLOL TARTRATE 1 MG/ML
1 INJECTION, SOLUTION INTRAVENOUS EVERY 5 MIN PRN
Status: DISCONTINUED | OUTPATIENT
Start: 2022-06-30 | End: 2022-06-30 | Stop reason: HOSPADM

## 2022-06-30 RX ORDER — CEFAZOLIN SODIUM 1 G/3ML
INJECTION, POWDER, FOR SOLUTION INTRAMUSCULAR; INTRAVENOUS AS NEEDED
Status: DISCONTINUED | OUTPATIENT
Start: 2022-06-30 | End: 2022-06-30 | Stop reason: SURG

## 2022-06-30 RX ORDER — DIPHENHYDRAMINE HYDROCHLORIDE 50 MG/ML
25 INJECTION INTRAMUSCULAR; INTRAVENOUS ONCE AS NEEDED
Status: DISCONTINUED | OUTPATIENT
Start: 2022-06-30 | End: 2022-06-30 | Stop reason: HOSPADM

## 2022-06-30 RX ORDER — TRANEXAMIC ACID 100 MG/ML
INJECTION, SOLUTION INTRAVENOUS AS NEEDED
Status: DISCONTINUED | OUTPATIENT
Start: 2022-06-30 | End: 2022-06-30 | Stop reason: SURG

## 2022-06-30 RX ORDER — ONDANSETRON HYDROCHLORIDE 2 MG/ML
4 INJECTION, SOLUTION INTRAVENOUS ONCE
Status: COMPLETED | OUTPATIENT
Start: 2022-06-30 | End: 2022-06-30

## 2022-06-30 RX ORDER — HYDROMORPHONE HYDROCHLORIDE 1 MG/ML
0.5 INJECTION, SOLUTION INTRAMUSCULAR; INTRAVENOUS; SUBCUTANEOUS EVERY 5 MIN PRN
Status: DISCONTINUED | OUTPATIENT
Start: 2022-06-30 | End: 2022-06-30 | Stop reason: HOSPADM

## 2022-06-30 RX ORDER — SODIUM CHLORIDE 0.9 G/100ML
INJECTION, SOLUTION IRRIGATION AS NEEDED
Status: DISCONTINUED | OUTPATIENT
Start: 2022-06-30 | End: 2022-06-30 | Stop reason: HOSPADM

## 2022-06-30 RX ORDER — ONDANSETRON HYDROCHLORIDE 2 MG/ML
4 INJECTION, SOLUTION INTRAVENOUS ONCE AS NEEDED
Status: DISCONTINUED | OUTPATIENT
Start: 2022-06-30 | End: 2022-06-30 | Stop reason: HOSPADM

## 2022-06-30 RX ORDER — DEXAMETHASONE SODIUM PHOSPHATE 4 MG/ML
4 INJECTION, SOLUTION INTRA-ARTICULAR; INTRALESIONAL; INTRAMUSCULAR; INTRAVENOUS; SOFT TISSUE ONCE
Status: COMPLETED | OUTPATIENT
Start: 2022-06-30 | End: 2022-06-30

## 2022-06-30 RX ADMIN — GABAPENTIN 300 MG: 300 CAPSULE ORAL at 21:47

## 2022-06-30 RX ADMIN — METOPROLOL TARTRATE 1 MG: 5 INJECTION INTRAVENOUS at 18:04

## 2022-06-30 RX ADMIN — NOREPINEPHRINE BITARTRATE 8 MCG: 1 INJECTION, SOLUTION, CONCENTRATE INTRAVENOUS at 16:15

## 2022-06-30 RX ADMIN — DEXMEDETOMIDINE HYDROCHLORIDE 4 MCG: 4 INJECTION, SOLUTION INTRAVENOUS at 15:46

## 2022-06-30 RX ADMIN — Medication 10 MG: at 15:46

## 2022-06-30 RX ADMIN — METOPROLOL TARTRATE 1 MG: 5 INJECTION INTRAVENOUS at 18:33

## 2022-06-30 RX ADMIN — PROPOFOL 150 MG: 10 INJECTION, EMULSION INTRAVENOUS at 14:53

## 2022-06-30 RX ADMIN — POTASSIUM CHLORIDE, DEXTROSE MONOHYDRATE AND SODIUM CHLORIDE 100 ML/HR: 150; 5; 900 INJECTION, SOLUTION INTRAVENOUS at 20:32

## 2022-06-30 RX ADMIN — DEXMEDETOMIDINE HYDROCHLORIDE 4 MCG: 4 INJECTION, SOLUTION INTRAVENOUS at 16:03

## 2022-06-30 RX ADMIN — NEOSTIGMINE METHYLSULFATE 2 MG: 1 INJECTION, SOLUTION INTRAVENOUS at 17:12

## 2022-06-30 RX ADMIN — ROCURONIUM BROMIDE 50 MG: 10 INJECTION INTRAVENOUS at 14:53

## 2022-06-30 RX ADMIN — DEXAMETHASONE SODIUM PHOSPHATE 4 MG: 4 INJECTION, SOLUTION INTRAMUSCULAR; INTRAVENOUS at 14:36

## 2022-06-30 RX ADMIN — TRANEXAMIC ACID 1000 MG: 100 INJECTION, SOLUTION INTRAVENOUS at 15:49

## 2022-06-30 RX ADMIN — IBUPROFEN 600 MG: 600 TABLET ORAL at 21:47

## 2022-06-30 RX ADMIN — FENTANYL CITRATE 50 MCG: 50 INJECTION, SOLUTION INTRAMUSCULAR; INTRAVENOUS at 14:48

## 2022-06-30 RX ADMIN — FENTANYL CITRATE 50 MCG: 50 INJECTION, SOLUTION INTRAMUSCULAR; INTRAVENOUS at 15:33

## 2022-06-30 RX ADMIN — FENTANYL CITRATE 50 MCG: 50 INJECTION, SOLUTION INTRAMUSCULAR; INTRAVENOUS at 16:03

## 2022-06-30 RX ADMIN — CEFAZOLIN 2000 MG: 2 INJECTION, POWDER, FOR SOLUTION INTRAMUSCULAR; INTRAVENOUS at 23:58

## 2022-06-30 RX ADMIN — Medication 10 MG: at 15:23

## 2022-06-30 RX ADMIN — CEFAZOLIN SODIUM 2 G: 1 INJECTION, POWDER, FOR SOLUTION INTRAMUSCULAR; INTRAVENOUS at 15:23

## 2022-06-30 RX ADMIN — FENTANYL CITRATE 50 MCG: 50 INJECTION, SOLUTION INTRAMUSCULAR; INTRAVENOUS at 14:53

## 2022-06-30 RX ADMIN — NOREPINEPHRINE BITARTRATE 8 MCG: 1 INJECTION, SOLUTION, CONCENTRATE INTRAVENOUS at 15:21

## 2022-06-30 RX ADMIN — POTASSIUM CHLORIDE, DEXTROSE MONOHYDRATE AND SODIUM CHLORIDE 100 ML/HR: 150; 5; 900 INJECTION, SOLUTION INTRAVENOUS at 09:55

## 2022-06-30 RX ADMIN — FENTANYL CITRATE 50 MCG: 50 INJECTION, SOLUTION INTRAMUSCULAR; INTRAVENOUS at 15:46

## 2022-06-30 RX ADMIN — METOPROLOL TARTRATE 1 MG: 5 INJECTION INTRAVENOUS at 18:26

## 2022-06-30 RX ADMIN — DEXMEDETOMIDINE HYDROCHLORIDE 4 MCG: 4 INJECTION, SOLUTION INTRAVENOUS at 14:48

## 2022-06-30 RX ADMIN — Medication 10 MG: at 16:03

## 2022-06-30 RX ADMIN — ONDANSETRON 4 MG: 2 INJECTION INTRAMUSCULAR; INTRAVENOUS at 14:36

## 2022-06-30 RX ADMIN — TRANEXAMIC ACID 1000 MG: 100 INJECTION, SOLUTION INTRAVENOUS at 17:09

## 2022-06-30 RX ADMIN — FENTANYL CITRATE 50 MCG: 0.05 INJECTION, SOLUTION INTRAMUSCULAR; INTRAVENOUS at 08:10

## 2022-06-30 RX ADMIN — Medication 10 MG: at 14:53

## 2022-06-30 RX ADMIN — FENTANYL CITRATE 50 MCG: 0.05 INJECTION, SOLUTION INTRAMUSCULAR; INTRAVENOUS at 12:32

## 2022-06-30 RX ADMIN — GLYCOPYRROLATE 0.2 MG: 0.2 INJECTION, SOLUTION INTRAMUSCULAR; INTRAVENOUS at 17:12

## 2022-06-30 RX ADMIN — Medication 10 MG: at 15:33

## 2022-06-30 RX ADMIN — SENNOSIDES AND DOCUSATE SODIUM 1 TABLET: 50; 8.6 TABLET ORAL at 21:46

## 2022-06-30 RX ADMIN — SODIUM CHLORIDE, POTASSIUM CHLORIDE, SODIUM LACTATE AND CALCIUM CHLORIDE 100 ML/HR: 600; 310; 30; 20 INJECTION, SOLUTION INTRAVENOUS at 14:36

## 2022-06-30 RX ADMIN — CYCLOBENZAPRINE 10 MG: 10 TABLET, FILM COATED ORAL at 21:46

## 2022-06-30 RX ADMIN — TRAZODONE HYDROCHLORIDE 100 MG: 100 TABLET ORAL at 21:46

## 2022-06-30 ASSESSMENT — ENCOUNTER SYMPTOMS: EXERCISE TOLERANCE: GOOD (>7 METS)

## 2022-06-30 NOTE — NURSING END OF SHIFT
Nursing End of Shift Summary:    Patient: Ralph Giron  MRN: [id number]  : 1950, Age: 72 y.o.    Location: MAIN OR/NONE    Nursing Goals  Clinical Goals for the Shift: pain mgmt, monitor vs/cms, npo/or today    Narrative Summary of Progress Toward Clinical Goals:  Patient aox4 throughout shift, severe pain at times/controlled well w/ iv fentanyl, went to OR for repair of clavicle fx    Barriers to Goals/Nursing Concerns:  none    New Patient or Family Concerns/Issues:  none    Shift Summary:   Significant Events & Communications to Providers (last 12 hours)     Last 5 Values    No documentation.             Oxygen Usage (last 12 hours)     Last 5 Values    No documentation.             Mobility (last 12 hours)     Last 5 Values     Mobility     Row Name 22 3263    Patient Position Supine    Turning Turns self    Anti-Embolism Devices Applied Bilateral;AE calf pump                    Urethral Catheter     Active Urethral Catheter     None            Active Lines     Active Central venous catheter / Peripherally inserted central catheter / Implantable Port / Hemodialysis catheter / Midline Catheter     None              Infusing Medications   Medication Dose Last Rate   • LR  100 mL/hr 100 mL/hr (22 1436)   • dextrose 5 % and sodium chloride 0.9% with KCL  100 mL/hr 100 mL/hr (22 0955)     PRN Medications   Medication Dose Last Admin   • ondansetron  4 mg     • diphenhydrAMINE  25 mg     • dexamethasone  4 mg     • meperidine  12.5 mg     • metoprolol  1 mg     • HYDROmorphone  0.5 mg     • [MAR Hold] fentaNYL citrate (PF)  50 mcg 50 mcg at 22 1232   • [MAR Hold] sodium chloride  3 mL     • [MAR Hold] acetaminophen  325-650 mg 650 mg at 22 1941   • [MAR Hold] oxyCODONE  5-10 mg 10 mg at 22 1740   • [MAR Hold] HYDROmorphone  0.4-0.6 mg 0.5 mg at 22 1015   • [MAR Hold] ondansetron  4 mg      Or   • [MAR Hold] ondansetron  4 mg 4 mg at 22 1014   • [MAR Hold] alum-mag  hydroxide-simeth  30 mL       _________________________  JOSE MINOR RN  06/30/22 5:56 PM

## 2022-06-30 NOTE — PLAN OF CARE
Problem: Neurosensory - Adult  Goal: Achieves stable or improved neurological status  Description: INTERVENTIONS  1. Assess for and report changes in neurological status  2. Initiate measures to prevent increased intracranial pressure  3. Maintain blood pressure and fluid volume within ordered parameters to optimize cerebral perfusion and minimize risk of hemorrhage  4. Monitor temperature, glucose, and sodium. Initiate appropriate interventions as ordered  Outcome: Progressing  Goal: Achieves maximal functionality and self care  Description: INTERVENTIONS  1. Monitor swallowing and airway patency with patient fatigue and changes in neurological status  2. Encourage and assist patient to increase activity and self care with guidance from PT/OT  3. Encourage visually impaired, hearing impaired and aphasic patients to use assistive/communication devices  Outcome: Progressing     Problem: Musculoskeletal - Adult  Goal: Return mobility to safest level of function  Description: INTERVENTIONS:  1. Assess patient stability and activity tolerance for standing, transferring and ambulating w/ or w/o assistive devices  2. Assist with transfers and ambulation using safe practices  3. Ensure adequate protection for wounds/incisions during mobilization  4. Obtain PT/OT and other consults as needed  5. Apply Continuous Passive Motion per order to increase flexion toward goal  6. Instruct patient/family in ordered activity level  Outcome: Progressing  Goal: Maintain proper alignment of affected body part  Description: INTERVENTIONS:  1. Support and protect limb and body alignment per provider order  2. Instruct and reinforce with patient and family use of appropriate assistive device and precautions (e.g. spinal or hip dislocation precautions)  Outcome: Progressing  Goal: Return ADL status to a safe level of function  Description: INTERVENTIONS:  1. Assess patient's ADL deficits and provide assistive devices as needed  2. Obtain  PT/OT consults as needed  3. Assist and instruct patient to increase activity and self care  Outcome: Progressing     Problem: Safety Adult - Fall  Goal: Free from fall injury  Description: INTERVENTIONS:    Inpatient - Please reference Cares/Safety Flowsheet under [place] Fall Risk for interventions.  Pediatrics - Please reference Peds Daily Cares/Safety Flowsheet under [facility] Pediatric Fall Assessment Fall Bundle for interventions  LD/OB - Please reference OB Shift Screening Flowsheet under OB Fall Risk for interventions.  Outcome: Progressing     Problem: Knowledge Deficit  Goal: Patient/family/caregiver demonstrates understanding of disease process, treatment plan, medications, and discharge instructions  Description: INTERVENTIONS:   1. Complete learning assessment and assess knowledge base  2. Provide teaching at level of understanding   3. Provide teaching via preferred learning methods  Outcome: Progressing     Problem: Potential for Compromised Skin Integrity  Goal: Skin Integrity is Maintained or Improved  Description: INTERVENTIONS:  1. Assess and monitor skin integrity  2. Collaborate with interdisciplinary team and initiate plans and interventions as needed  3. Alternate a full bath with partial baths for elderly   4. Monitor patient's hygiene practices   5. Collaborate with wound, ostomy, and continence nurse  Outcome: Progressing  Goal: Nutritional status is improving  Description: INTERVENTIONS:  1. Monitor and assess patient for malnutrition (ex- brittle hair, bruises, dry skin, pale skin and conjunctiva, muscle wasting, smooth red tongue, and disorientation)  2. Monitor patient's weight and dietary intake as ordered or per policy  3. Determine patient's food preferences and provide high-protein, high-caloric foods as appropriate  4. Assist patient with eating   5. Allow adequate time for meals   6. Encourage patient to take dietary supplement as ordered   7. Collaborate with dietitian  8. Include  patient/family/caregiver in decisions related to nutrition  Outcome: Progressing  Goal: MOBILITY IS MAINTAINED OR IMPROVED  Description: INTERVENTIONS  1. Collaborate with interdisciplinary team and initiate plan and interventions as ordered (PT/OT)  2. Encourage ambulation  3. Up to chair for meals  4. Monitor for signs of deconditioning  Outcome: Progressing

## 2022-06-30 NOTE — ANESTHESIA PREPROCEDURE EVALUATION
"Pre-Procedure Assessment    Patient: Ralph Giron, male, 72 y.o.    Ht Readings from Last 1 Encounters:   06/29/22 1.753 m (5' 9\")     Wt Readings from Last 1 Encounters:   06/29/22 115.1 kg (253 lb 12 oz)       Last Vitals  BP      Temp     Pulse     Resp      SpO2      Pain Score         Problem list reviewed and Medical history reviewed    No history of anesthetic complications:    No family history of anesthetic complications:      Airway   Mallampati: III  TM distance: >3 FB  Neck ROM: full      Dental      Pulmonary     breath sounds clear to auscultation    ROS comment: XR chest portable 1 view  Order: [id number]  Status: Final result    Visible to patient: No (scheduled for 7/3/2022  1:13 PM)    Next appt: None    0 Result Notes    Details      Reading Physician Reading Date Result Priority  Jaime Soto MD  [phone] 6/30/2022     Narrative & Impression  Exam:   DX CHEST PORTABLE 1 VW 06/30/2022 .     Clinical History:    pneumothorax     Comparison(s):  6/29/2022     Findings:   Low volumes. Cardiomegaly. Redemonstration left-sided displaced rib fractures without pneumothorax. Some basilar atelectasis present.        IMPRESSION:  1. no significant change.      Cardiovascular - negative ROS  Exercise tolerance: good (>7 METS)    Rhythm: regular  Rate: normal    Mental Status/Neuro/Psych - negative ROS   Pt is alert.        GI/Hepatic/Renal - negative ROS     Endo/Other      Comments: IMPRESSION:  1. Mild displaced left clavicle fractures.  2. Additional fractures involving the left scapula, acromion, and left posterior ribs.  Abdominal           Social History     Tobacco Use   • Smoking status: Not on file   • Smokeless tobacco: Not on file   Substance Use Topics   • Alcohol use: Not on file      Hematology   WBC   Date Value Ref Range Status   06/30/2022 8.4 3.7 - 9.6 10*3/uL Final     RBC   Date Value Ref Range Status   06/30/2022 4.72 4.10 - 5.80 10*6/µL Final     MCV   Date Value Ref Range Status "   06/30/2022 83.2 82.0 - 97.0 fL Final     Hemoglobin   Date Value Ref Range Status   06/30/2022 12.9 (L) 13.2 - 17.2 g/dL Final     Hematocrit   Date Value Ref Range Status   06/30/2022 39.3 38.0 - 50.0 % Final     Platelets   Date Value Ref Range Status   06/30/2022 157 130 - 350 10*3/uL Final      Coagulation No results found for: PT, APTT, INR   General Chemistry   Calcium   Date Value Ref Range Status   06/30/2022 8.6 8.6 - 10.3 mg/dL Final     BUN   Date Value Ref Range Status   06/30/2022 22 7 - 25 mg/dL Final     Creatinine   Date Value Ref Range Status   06/30/2022 1.42 (H) 0.70 - 1.30 mg/dL Final     Glucose   Date Value Ref Range Status   06/30/2022 130 (H) 70 - 105 mg/dL Final     Sodium   Date Value Ref Range Status   06/30/2022 141 135 - 145 mmol/L Final     Potassium   Date Value Ref Range Status   06/30/2022 4.4 3.5 - 5.1 MMOL/L Final     Magnesium   Date Value Ref Range Status   06/30/2022 2.2 1.8 - 2.4 mg/dL Final     CO2   Date Value Ref Range Status   06/30/2022 26 21 - 32 mmol/L Final     Chloride   Date Value Ref Range Status   06/30/2022 107 98 - 107 mmol/L Final     Anesthesia Plan    ASA 3   NPO status reviewed: > 6 hours    General         Induction: intravenous   Airway Planning: oral ET tube    Additional Comments: Consented for a block is needed      Plan for postoperative opioid use.    Anesthetic plan and risks discussed with patient.  Use of blood products discussed with patient who.     Plan discussed with CRNA.

## 2022-06-30 NOTE — PLAN OF CARE
Problem: Neurosensory - Adult  Goal: Achieves stable or improved neurological status  Description: INTERVENTIONS  1. Assess for and report changes in neurological status  2. Initiate measures to prevent increased intracranial pressure  3. Maintain blood pressure and fluid volume within ordered parameters to optimize cerebral perfusion and minimize risk of hemorrhage  4. Monitor temperature, glucose, and sodium. Initiate appropriate interventions as ordered  Outcome: Progressing  Goal: Achieves maximal functionality and self care  Description: INTERVENTIONS  1. Monitor swallowing and airway patency with patient fatigue and changes in neurological status  2. Encourage and assist patient to increase activity and self care with guidance from PT/OT  3. Encourage visually impaired, hearing impaired and aphasic patients to use assistive/communication devices  Outcome: Progressing     Problem: Musculoskeletal - Adult  Goal: Return mobility to safest level of function  Description: INTERVENTIONS:  1. Assess patient stability and activity tolerance for standing, transferring and ambulating w/ or w/o assistive devices  2. Assist with transfers and ambulation using safe practices  3. Ensure adequate protection for wounds/incisions during mobilization  4. Obtain PT/OT and other consults as needed  5. Apply Continuous Passive Motion per order to increase flexion toward goal  6. Instruct patient/family in ordered activity level  Outcome: Progressing  Goal: Maintain proper alignment of affected body part  Description: INTERVENTIONS:  1. Support and protect limb and body alignment per provider order  2. Instruct and reinforce with patient and family use of appropriate assistive device and precautions (e.g. spinal or hip dislocation precautions)  Outcome: Progressing  Goal: Return ADL status to a safe level of function  Description: INTERVENTIONS:  1. Assess patient's ADL deficits and provide assistive devices as needed  2. Obtain  PT/OT consults as needed  3. Assist and instruct patient to increase activity and self care  Outcome: Progressing     Problem: Safety Adult - Fall  Goal: Free from fall injury  Description: INTERVENTIONS:    Inpatient - Please reference Cares/Safety Flowsheet under [facility] Fall Risk for interventions.  Pediatrics - Please reference Peds Daily Cares/Safety Flowsheet under [facility] Pediatric Fall Assessment Fall Bundle for interventions  LD/OB - Please reference OB Shift Screening Flowsheet under OB Fall Risk for interventions.  Outcome: Progressing     Problem: Knowledge Deficit  Goal: Patient/family/caregiver demonstrates understanding of disease process, treatment plan, medications, and discharge instructions  Description: INTERVENTIONS:   1. Complete learning assessment and assess knowledge base  2. Provide teaching at level of understanding   3. Provide teaching via preferred learning methods  Outcome: Progressing     Problem: Potential for Compromised Skin Integrity  Goal: Skin Integrity is Maintained or Improved  Description: INTERVENTIONS:  1. Assess and monitor skin integrity  2. Collaborate with interdisciplinary team and initiate plans and interventions as needed  3. Alternate a full bath with partial baths for elderly   4. Monitor patient's hygiene practices   5. Collaborate with wound, ostomy, and continence nurse  Outcome: Progressing  Goal: Nutritional status is improving  Description: INTERVENTIONS:  1. Monitor and assess patient for malnutrition (ex- brittle hair, bruises, dry skin, pale skin and conjunctiva, muscle wasting, smooth red tongue, and disorientation)  2. Monitor patient's weight and dietary intake as ordered or per policy  3. Determine patient's food preferences and provide high-protein, high-caloric foods as appropriate  4. Assist patient with eating   5. Allow adequate time for meals   6. Encourage patient to take dietary supplement as ordered   7. Collaborate with dietitian  8. Include  patient/family/caregiver in decisions related to nutrition  Outcome: Progressing  Goal: MOBILITY IS MAINTAINED OR IMPROVED  Description: INTERVENTIONS  1. Collaborate with interdisciplinary team and initiate plan and interventions as ordered (PT/OT)  2. Encourage ambulation  3. Up to chair for meals  4. Monitor for signs of deconditioning  Outcome: Progressing     Problem: Urinary Incontinence  Goal: Perineal skin integrity is maintained or improved  Description: INTERVENTIONS:  1. Assess genitourinary system, perineal skin, labs (urinalysis), and history of incontinence to include past management, aggravating, and alleviating factors  2. Collaborate with interdisciplinary team including wound, ostomy, and continence nurse and initiate plans and interventions as needed  4. Consider urine containment device  5. Apply skin protectant   6. Develop skin care regimen  7. Provide privacy when changing patient's incontinence device to maintain their dignity  Outcome: Progressing
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
## 2022-06-30 NOTE — NURSING END OF SHIFT
Nursing End of Shift Summary:    Patient: Ralph Giron  MRN: [id number]  : 1950, Age: 72 y.o.    Location: [address] [place] [address]    Nursing Goals  Clinical Goals for the Shift: make comfortable    Narrative Summary of Progress Toward Clinical Goals:  Safety and comfort maintained. Pain controlled.Neuro status remained unchanged. NPO since midnight.    Barriers to Goals/Nursing Concerns:  Surgery    New Patient or Family Concerns/Issues:  No    Shift Summary:   Significant Events & Communications to Providers (last 12 hours)     Last 5 Values     Row Name 22 1754                   Provider Notification    Reason for Communication Patient request  pt takes trazodone to sleep. Could I get an order for this?  -        Provider Name Dr. Zhang  -              User Key  (r) = Recorded By, (t) = Taken By, (c) = Cosigned By    Initials Name     Min Kevin RN            Oxygen Usage (last 12 hours)     Last 5 Values     Row Name 22 0502                Oxygen Weaning Trial by Nursing    Is Patient on Room Air OR on the Same Amount of O2 as at Home? Yes Yes               Mobility (last 12 hours)     Last 5 Values     Row Name 22 2313 22 2350                Mobility    Patient Position Supine Supine       Turning Turns self --       Anti-Embolism Devices Applied Bilateral;AE calf pump --                 Urethral Catheter     Active Urethral Catheter     None            Active Lines     Active Central venous catheter / Peripherally inserted central catheter / Implantable Port / Hemodialysis catheter / Midline Catheter     None              Infusing Medications   Medication Dose Last Rate   • dextrose 5 % and sodium chloride 0.9% with KCL  100 mL/hr 100 mL/hr (22 0115)     PRN Medications   Medication Dose Last Admin   • fentaNYL citrate (PF)  50 mcg 50 mcg at 22   • sodium chloride  3 mL     • acetaminophen  325-650 mg 650 mg at 22   • oxyCODONE  5-10  mg 10 mg at 06/29/22 1740   • HYDROmorphone  0.4-0.6 mg 0.5 mg at 06/29/22 1015   • ondansetron  4 mg      Or   • ondansetron  4 mg 4 mg at 06/29/22 1014   • alum-mag hydroxide-simeth  30 mL       _________________________  Mariana Wilcox RN  06/30/22 5:31 AM
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
## 2022-06-30 NOTE — PROGRESS NOTES
Orthopedic Progress Note    HPI: Patient seen and examined. Doing ok.  Complains primarily of pain over the collarbone and in his ribs.  Denies significant pain in the shoulder blade area.  Patient's wife is also present on her examination.    Physical Exam:  General: Alert, Awake, NAD, CPAP machine in place.  Skin: warm and dry, small superficial abrasions overlying the mid left clavicle  Vascular: Cap refill brisk, pulse of left upper extremity 2+  Msk: Left upper extremity with some swelling overlying the clavicle area.  Tenderness with palpation over mid to distal clavicle.  Pain with range of motion of the arm.  Neuro: Distal neurovascular status intact.     LABS  CBC:   Lab Results   Component Value Date    WBC 8.4 06/30/2022    RBC 4.72 06/30/2022    HGB 12.9 (L) 06/30/2022    HCT 39.3 06/30/2022     06/30/2022     BMP:   Lab Results   Component Value Date    GLUCOSE 130 (H) 06/30/2022     06/30/2022    K 4.4 06/30/2022     06/30/2022    CO2 26 06/30/2022    BUN 22 06/30/2022    CREATININE 1.42 (H) 06/30/2022    CALCIUM 8.6 06/30/2022           Vital signs in last 24 hours:  Temp:  [35.4 °C (95.7 °F)-36.6 °C (97.8 °F)] 35.4 °C (95.7 °F)  Heart Rate:  [36-67] 60  Resp:  [8-32] 16  SpO2:  [87 %-98 %] 98 %  BP: (121-168)/() 121/79  SpO2/FiO2 Ratio Using Approximate FiO2 (%):  [287.5-387.5] 339.3  Estimated P/F Ratio Using Approximate FiO2 (%):  [251.2-332.2] 293.1        Assessment/Plan   Left midshaft clavicle fracture, left scapular fracture, left acromion fracture  --Discussed with patient options of nonoperative treatment versus ORIF of the clavicle.  Patient and his wife have elected to proceed with operative management to help stabilize the clavicle.  -- N.p.o. now  -- Plan to take the patient to the OR this afternoon for ORIF of the left clavicle  -- Following surgery, patient will require 3 doses of postoperative antibiotics, but after that will be cleared to discharge from an  orthopedic perspective.      Shyla Barreto PA-C

## 2022-06-30 NOTE — ANESTHESIA PROCEDURE NOTES
Airway  Date/Time: 6/30/2022 2:58 PM  Urgency: elective    Airway not difficult    General Information and Staff    Patient location during procedure: OR  CRNA: Sidra Joyce CRNA  Performed: CRNA     Indications and Patient Condition  Indications for airway management: anesthesia and airway protection  Spontaneous Ventilation: absent  Sedation level: deep  Preoxygenated: yes  Patient position: sniffing  Mask difficulty assessment: 2 - vent by mask + OA or adjuvant +/- NMBA    Final Airway Details  Final airway type: endotracheal airway      Successful airway: ETT  Cuffed: yes   Successful intubation technique: direct laryngoscopy  Facilitating devices/methods: stylet  Endotracheal tube insertion site: oral  Blade: Gualberto  Blade size: #3  ETT size (mm): 7.5  Cormack-Lehane Classification: grade I - full view of glottis  Placement verified by: chest auscultation, capnometry and palpation of cuff   Measured from: lips  ETT to lips (cm): 23  Number of attempts at approach: 1

## 2022-06-30 NOTE — INTERDISCIPLINARY/THERAPY
06/30/22 1007   OT Last Visit   OT Received On 06/30/22   General   Missed Treatment Reason Surgery   Subjective Comments   RN Stated patient is medically cleared for therapy No   Subjective Comments pt going for surgery this AM for clavicle fx stabilization. HOLD OT eval and assess status post MCA/surgery   Plan   Plan Comment OT new eval post surgery check orders

## 2022-07-01 ENCOUNTER — APPOINTMENT (OUTPATIENT)
Dept: RADIOLOGY | Facility: HOSPITAL | Age: 72
DRG: 982 | End: 2022-07-01
Payer: MEDICARE

## 2022-07-01 LAB
ANION GAP SERPL CALC-SCNC: 11 MMOL/L (ref 3–11)
BUN SERPL-MCNC: 14 MG/DL (ref 7–25)
CALCIUM SERPL-MCNC: 8.4 MG/DL (ref 8.6–10.3)
CHLORIDE SERPL-SCNC: 104 MMOL/L (ref 98–107)
CO2 SERPL-SCNC: 25 MMOL/L (ref 21–32)
CREAT SERPL-MCNC: 1.1 MG/DL (ref 0.7–1.3)
ERYTHROCYTE [DISTWIDTH] IN BLOOD BY AUTOMATED COUNT: 15.7 % (ref 11.5–15)
GFR SERPL CREATININE-BSD FRML MDRD: 71 ML/MIN/1.73M*2
GLUCOSE SERPL-MCNC: 179 MG/DL (ref 70–105)
HCT VFR BLD AUTO: 38.8 % (ref 38–50)
HGB BLD-MCNC: 12.8 G/DL (ref 13.2–17.2)
MCH RBC QN AUTO: 27.4 PG (ref 29–34)
MCHC RBC AUTO-ENTMCNC: 32.9 G/DL (ref 32–36)
MCV RBC AUTO: 83.1 FL (ref 82–97)
PLATELET # BLD AUTO: 138 10*3/UL (ref 130–350)
PMV BLD AUTO: 7 FL (ref 6.9–10.8)
POTASSIUM SERPL-SCNC: 4 MMOL/L (ref 3.5–5.1)
RBC # BLD AUTO: 4.67 10*6/ΜL (ref 4.1–5.8)
SODIUM SERPL-SCNC: 140 MMOL/L (ref 135–145)
WBC # BLD AUTO: 11.9 10*3/UL (ref 3.7–9.6)

## 2022-07-01 PROCEDURE — 2580000300 HC RX 258: Performed by: NURSE PRACTITIONER

## 2022-07-01 PROCEDURE — 6360000200 HC RX 636 W HCPCS (ALT 250 FOR IP): Performed by: EMERGENCY MEDICINE

## 2022-07-01 PROCEDURE — 2590000100 HC RX 259: Performed by: NURSE PRACTITIONER

## 2022-07-01 PROCEDURE — 6360000200 HC RX 636 W HCPCS (ALT 250 FOR IP): Performed by: NURSE PRACTITIONER

## 2022-07-01 PROCEDURE — 97161 PT EVAL LOW COMPLEX 20 MIN: CPT | Mod: GP | Performed by: PHYSICAL THERAPIST

## 2022-07-01 PROCEDURE — 99232 SBSQ HOSP IP/OBS MODERATE 35: CPT | Performed by: NURSE PRACTITIONER

## 2022-07-01 PROCEDURE — 36415 COLL VENOUS BLD VENIPUNCTURE: CPT | Performed by: NURSE PRACTITIONER

## 2022-07-01 PROCEDURE — (BLANK) HC ROOM PRIVATE

## 2022-07-01 PROCEDURE — 2580000300 HC RX 258: Performed by: PHYSICIAN ASSISTANT

## 2022-07-01 PROCEDURE — 85027 COMPLETE CBC AUTOMATED: CPT | Performed by: NURSE PRACTITIONER

## 2022-07-01 PROCEDURE — 6370000100 HC RX 637 (ALT 250 FOR IP): Performed by: NURSE PRACTITIONER

## 2022-07-01 PROCEDURE — 6360000200 HC RX 636 W HCPCS (ALT 250 FOR IP): Performed by: SURGERY

## 2022-07-01 PROCEDURE — 80048 BASIC METABOLIC PNL TOTAL CA: CPT | Performed by: NURSE PRACTITIONER

## 2022-07-01 PROCEDURE — 97530 THERAPEUTIC ACTIVITIES: CPT | Mod: GO | Performed by: OCCUPATIONAL THERAPIST

## 2022-07-01 PROCEDURE — 71045 X-RAY EXAM CHEST 1 VIEW: CPT

## 2022-07-01 PROCEDURE — 6360000200 HC RX 636 W HCPCS (ALT 250 FOR IP): Performed by: PHYSICIAN ASSISTANT

## 2022-07-01 PROCEDURE — 97166 OT EVAL MOD COMPLEX 45 MIN: CPT | Mod: GO | Performed by: OCCUPATIONAL THERAPIST

## 2022-07-01 PROCEDURE — 97116 GAIT TRAINING THERAPY: CPT | Mod: GP | Performed by: PHYSICAL THERAPIST

## 2022-07-01 PROCEDURE — 94660 CPAP INITIATION&MGMT: CPT

## 2022-07-01 RX ORDER — LANSOPRAZOLE 30 MG/1
30 CAPSULE, DELAYED RELEASE ORAL DAILY
Status: DISCONTINUED | OUTPATIENT
Start: 2022-07-01 | End: 2022-07-06 | Stop reason: HOSPADM

## 2022-07-01 RX ORDER — PRAZOSIN HYDROCHLORIDE 1 MG/1
6 CAPSULE ORAL NIGHTLY
Status: DISCONTINUED | OUTPATIENT
Start: 2022-07-01 | End: 2022-07-06 | Stop reason: HOSPADM

## 2022-07-01 RX ORDER — BUPROPION HYDROCHLORIDE 150 MG/1
150 TABLET ORAL DAILY
Status: DISCONTINUED | OUTPATIENT
Start: 2022-07-01 | End: 2022-07-06 | Stop reason: HOSPADM

## 2022-07-01 RX ORDER — DICYCLOMINE HYDROCHLORIDE 10 MG/1
20 CAPSULE ORAL 3 TIMES DAILY
Status: DISCONTINUED | OUTPATIENT
Start: 2022-07-01 | End: 2022-07-06 | Stop reason: HOSPADM

## 2022-07-01 RX ORDER — ATORVASTATIN CALCIUM 10 MG/1
10 TABLET, FILM COATED ORAL NIGHTLY
Status: DISCONTINUED | OUTPATIENT
Start: 2022-07-01 | End: 2022-07-06 | Stop reason: HOSPADM

## 2022-07-01 RX ORDER — TRAZODONE HYDROCHLORIDE 100 MG/1
100 TABLET ORAL NIGHTLY
Status: DISCONTINUED | OUTPATIENT
Start: 2022-07-01 | End: 2022-07-06 | Stop reason: HOSPADM

## 2022-07-01 RX ORDER — FLUTICASONE PROPIONATE 50 MCG
2 SPRAY, SUSPENSION (ML) NASAL DAILY
Status: DISCONTINUED | OUTPATIENT
Start: 2022-07-01 | End: 2022-07-06 | Stop reason: HOSPADM

## 2022-07-01 RX ORDER — SPIRONOLACTONE 50 MG/1
50 TABLET, FILM COATED ORAL DAILY
Status: DISCONTINUED | OUTPATIENT
Start: 2022-07-01 | End: 2022-07-03

## 2022-07-01 RX ORDER — ATENOLOL 50 MG/1
50 TABLET ORAL DAILY
Status: DISCONTINUED | OUTPATIENT
Start: 2022-07-01 | End: 2022-07-06 | Stop reason: HOSPADM

## 2022-07-01 RX ORDER — VENLAFAXINE HYDROCHLORIDE 75 MG/1
225 CAPSULE, EXTENDED RELEASE ORAL DAILY
Status: DISCONTINUED | OUTPATIENT
Start: 2022-07-01 | End: 2022-07-06 | Stop reason: HOSPADM

## 2022-07-01 RX ORDER — METOPROLOL TARTRATE 25 MG/1
25 TABLET, FILM COATED ORAL 2 TIMES DAILY
Status: DISCONTINUED | OUTPATIENT
Start: 2022-07-01 | End: 2022-07-01

## 2022-07-01 RX ORDER — HYDRALAZINE HYDROCHLORIDE 20 MG/ML
10 INJECTION INTRAMUSCULAR; INTRAVENOUS EVERY 6 HOURS PRN
Status: DISCONTINUED | OUTPATIENT
Start: 2022-07-01 | End: 2022-07-06 | Stop reason: HOSPADM

## 2022-07-01 RX ADMIN — FLUTICASONE PROPIONATE 2 SPRAY: 50 SPRAY, METERED NASAL at 09:37

## 2022-07-01 RX ADMIN — LANSOPRAZOLE 30 MG: 30 CAPSULE, DELAYED RELEASE ORAL at 08:55

## 2022-07-01 RX ADMIN — CEFAZOLIN 2000 MG: 2 INJECTION, POWDER, FOR SOLUTION INTRAMUSCULAR; INTRAVENOUS at 14:10

## 2022-07-01 RX ADMIN — GABAPENTIN 300 MG: 300 CAPSULE ORAL at 14:04

## 2022-07-01 RX ADMIN — SENNOSIDES AND DOCUSATE SODIUM 1 TABLET: 50; 8.6 TABLET ORAL at 09:01

## 2022-07-01 RX ADMIN — HYDRALAZINE HYDROCHLORIDE 10 MG: 20 INJECTION INTRAMUSCULAR; INTRAVENOUS at 01:51

## 2022-07-01 RX ADMIN — VENLAFAXINE HYDROCHLORIDE 225 MG: 75 CAPSULE, EXTENDED RELEASE ORAL at 09:01

## 2022-07-01 RX ADMIN — POLYETHYLENE GLYCOL 3350 17 G: 17 POWDER, FOR SOLUTION ORAL at 09:02

## 2022-07-01 RX ADMIN — ATENOLOL 50 MG: 50 TABLET ORAL at 08:55

## 2022-07-01 RX ADMIN — GABAPENTIN 300 MG: 300 CAPSULE ORAL at 20:21

## 2022-07-01 RX ADMIN — PRAZOSIN HYDROCHLORIDE 6 MG: 1 CAPSULE ORAL at 20:22

## 2022-07-01 RX ADMIN — DICYCLOMINE HYDROCHLORIDE 20 MG: 10 CAPSULE ORAL at 08:53

## 2022-07-01 RX ADMIN — IBUPROFEN 600 MG: 600 TABLET ORAL at 13:06

## 2022-07-01 RX ADMIN — DICYCLOMINE HYDROCHLORIDE 20 MG: 10 CAPSULE ORAL at 20:21

## 2022-07-01 RX ADMIN — FENTANYL CITRATE 50 MCG: 0.05 INJECTION, SOLUTION INTRAMUSCULAR; INTRAVENOUS at 19:16

## 2022-07-01 RX ADMIN — HYDRALAZINE HYDROCHLORIDE 10 MG: 20 INJECTION INTRAMUSCULAR; INTRAVENOUS at 13:18

## 2022-07-01 RX ADMIN — SPIRONOLACTONE 50 MG: 50 TABLET ORAL at 09:01

## 2022-07-01 RX ADMIN — POTASSIUM CHLORIDE, DEXTROSE MONOHYDRATE AND SODIUM CHLORIDE 15 ML/HR: 150; 5; 900 INJECTION, SOLUTION INTRAVENOUS at 03:52

## 2022-07-01 RX ADMIN — DICYCLOMINE HYDROCHLORIDE 20 MG: 10 CAPSULE ORAL at 14:04

## 2022-07-01 RX ADMIN — IBUPROFEN 600 MG: 600 TABLET ORAL at 08:55

## 2022-07-01 RX ADMIN — OXYCODONE HYDROCHLORIDE 5 MG: 5 TABLET ORAL at 13:06

## 2022-07-01 RX ADMIN — ENOXAPARIN SODIUM 40 MG: 100 INJECTION SUBCUTANEOUS at 13:06

## 2022-07-01 RX ADMIN — IBUPROFEN 600 MG: 600 TABLET ORAL at 17:36

## 2022-07-01 RX ADMIN — OXYCODONE HYDROCHLORIDE 5 MG: 5 TABLET ORAL at 08:55

## 2022-07-01 RX ADMIN — ACETAMINOPHEN 650 MG: 325 TABLET ORAL at 09:01

## 2022-07-01 RX ADMIN — FENTANYL CITRATE 50 MCG: 0.05 INJECTION, SOLUTION INTRAMUSCULAR; INTRAVENOUS at 11:11

## 2022-07-01 RX ADMIN — OXYCODONE HYDROCHLORIDE 5 MG: 5 TABLET ORAL at 17:36

## 2022-07-01 RX ADMIN — BUPROPION HYDROCHLORIDE 150 MG: 150 TABLET, EXTENDED RELEASE ORAL at 08:55

## 2022-07-01 RX ADMIN — CYCLOBENZAPRINE 10 MG: 10 TABLET, FILM COATED ORAL at 20:22

## 2022-07-01 RX ADMIN — LIDOCAINE 2 PATCH: 50 PATCH TOPICAL at 08:57

## 2022-07-01 RX ADMIN — CEFAZOLIN 2000 MG: 2 INJECTION, POWDER, FOR SOLUTION INTRAMUSCULAR; INTRAVENOUS at 06:57

## 2022-07-01 RX ADMIN — SENNOSIDES AND DOCUSATE SODIUM 1 TABLET: 50; 8.6 TABLET ORAL at 20:22

## 2022-07-01 RX ADMIN — CYCLOBENZAPRINE 10 MG: 10 TABLET, FILM COATED ORAL at 08:55

## 2022-07-01 RX ADMIN — CYCLOBENZAPRINE 10 MG: 10 TABLET, FILM COATED ORAL at 14:04

## 2022-07-01 RX ADMIN — GABAPENTIN 300 MG: 300 CAPSULE ORAL at 08:55

## 2022-07-01 RX ADMIN — TRAZODONE HYDROCHLORIDE 100 MG: 100 TABLET ORAL at 20:21

## 2022-07-01 RX ADMIN — ATORVASTATIN CALCIUM 10 MG: 10 TABLET, FILM COATED ORAL at 20:21

## 2022-07-01 NOTE — ANESTHESIA POSTPROCEDURE EVALUATION
Patient: Ralph Giron    Procedure Summary     Date: 06/30/22 Room / Location: [facility] OR  / [facility] OR    Anesthesia Start: 1448 Anesthesia Stop: 1737    Procedure: OPEN REDUCTION INTERNAL FIXATION CLAVICLE (Left Shoulder) Diagnosis:       Closed displaced fracture of left clavicle      (Closed displaced fracture of left clavicle [S42.002A])    Surgeons: Vadim Gee MD Responsible Provider: Les Stewart MD    Anesthesia Type: general ASA Status: 3          Anesthesia Type: general    Last vitals  Vitals Value Taken Time   /88 06/30/22 1845   Temp 37.7 °C (99.9 °F) 06/30/22 1840   Pulse 85 06/30/22 1845   Resp 17 06/30/22 1845   SpO2 99 % 06/30/22 1845   0-10 Pain Score 2 06/30/22 1907       Anesthesia Post Evaluation    Patient location during evaluation: PACU  Patient participation: complete - patient participated  Level of consciousness: awake and alert  Pain management: adequate  Airway patency: patent  Anesthetic complications: no  Cardiovascular status: acceptable  Respiratory status: acceptable  Hydration status: acceptable  May dismiss recovered patient based on consultation with the appropriate physicians and/or meeting appropriate discharge criteria      Cosmetic?  This procedure is not cosmetic.
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
## 2022-07-01 NOTE — PROGRESS NOTES
Daily Progress Note    HPI: Patient seen and examined alongside Dr. Watt. Doing ok. No new complaints    Physical Exam:  General: Alert, Awake NAD  Operative extremity dressing c/d/i.  Sling in place.  Distal neurovascular status unchanged.  Extremities: extremities normal, warm and well-perfused; no cyanosis, clubbing, or edema    LABS  CBC:   Lab Results   Component Value Date    WBC 8.4 06/30/2022    RBC 4.72 06/30/2022    HGB 12.9 (L) 06/30/2022    HCT 39.3 06/30/2022     06/30/2022        Vital signs in last 24 hours:  Temp:  [36.8 °C (98.3 °F)-37.7 °C (99.9 °F)] 36.8 °C (98.3 °F)  Heart Rate:  [66-98] 98  Resp:  [14-19] 18  SpO2:  [91 %-100 %] 91 %  BP: (134-189)/() 134/65  SpO2/FiO2 Ratio Using Approximate FiO2 (%):  [306.3-400] 339.3  Estimated P/F Ratio Using Approximate FiO2 (%):  [266.4-342.3] 293.1      Assessment/Plan   POD #1 status post ORIF left clavicle  --Continue current cares  --NWB to LUE  --PT/OT for mobilization training  --DVT prophylaxis: From an orthopedic standpoint, may begin chemoprophylaxis today but will defer to primary/attending  -- Orthopedically stable, will continue to follow along      NY Santos  [facility]     [place] Phone: [phone]    Dragon voice recognition program was used to aid in this documentation which might generate inaccuracies despite efforts made to avoid them.  Please take into context and contact the provider if areas of conflict are identified.
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
## 2022-07-01 NOTE — OP NOTE
DATE OF SURGERY:  6/30/22    PREOPERATIVE DIAGNOSES:  1.     Closed, displaced left clavicle fracture  2.     Left acromion fracture versus os acromiale  3.     Multiple left rib fractures    POSTOPERATIVE DIAGNOSES:  1.     Closed, displaced left clavicle fracture  2.     Left acromion fracture versus os acromiale  3.     Multiple left rib fractures    PROCEDURES:  1.     Open reduction and internal fixation of left clavicle (89497)    SURGEON:  Vadim Gee M.D.  FIRST ASSISTANT:  None.  ANESTHESIA:  General.    INDICATIONS FOR SURGERY:  To restore alignment and provide stability during the healing process.  Please see previous notes for additional discussion.    FINDINGS AT SURGERY:  1.     The patient's clavicle anatomy was somewhat unusual, likely due to previous surgery laterally but also some atypical ridges and contours medially.  2.     The fracture could be reduced anatomically and held with reduction clamps, but lag screw fixation was unsuccessful due to the trajectory necessitated by the patient's head and interference of the screw head with the plate.  3.     Definitive stabilization was accomplished with a Synthes 2.7 mm precontoured locking clavicle plate, with 3 cortical screws and 2 locking screws placed on each side of the fracture, achieving excellent overall alignment and stability.    PROCEDURE IN DETAIL:   Informed consent was obtained.  The patient was positioned supine, and general anesthesia was instituted.  He was then placed in the beach chair position and stabilized and padded appropriately.  Intravenous antibiotics and (later) tranexamic acid were administered.  The left chest, shoulder, and upper extremity were prepped and draped sterilely.  Time-out was called, and appropriate protocol followed.  An incision was made following the palpable course of the clavicle, and dissection was carried through the subcutaneous tissues and the deeper layer with electrocautery (obtaining  hemostasis with some difficulty in one area, finally utilizing suture ties after lack of success with electrocautery).  The fracture site was exposed subperiosteally and thoroughly cleaned out with various instruments.  The fracture was reduced anatomically and held provisionally with pointed clamps.  A lag screw was placed from superomedial to inferolateral, obtaining good purchase but leaving the screw head prominent superiorly, thereby interfering with plate positioning (and necessitating screw removal).  Various plates were then evaluated, and none of the 3.5 plates fit well.  The 2.7 plates fit much better, and one of appropriate length was selected and positioned over the fracture site to maximize bony contact.  Cortical screws were placed initially on each side of the fracture in the combi holes, followed by metaphyseal screws further from the fracture site and locking screws to complete the fixation.  Good purchase was obtained throughout.  Final C-arm images in multiple planes confirmed good overall alignment and appropriate hardware placement.  The wound was thoroughly irrigated and closed in layers using running 0 Vicryl in the deep tissues, buried running 2-0 Vicryl subcutaneously, and skin staples.  A Mepilex dressing was applied, reinforced by Tegaderm.  A sling was applied.    ESTIMATED BLOOD LOSS:  100 cc.    COUNTS:  Correct.    DRAINS:  None.    SPECIMENS:  None.    COMPLICATIONS:  None.    PROGNOSIS:  Good.    DISPOSITION:  The patient tolerated the procedure well and was transported to the recovery room in stable condition.    PLAN:  The patient will be readmitted under the trauma service and given 24 hours of post-op IV antibiotics.  He will be mobilized as soon as possible, nonweightbearing to the left upper extremity (for 6 weeks, as well as avoiding overhead motion).  He will require staple removal at about 2 weeks postoperatively (no x-rays), with x-rays in approximately 6 weeks.      Vadim  ANNALEE Gee M.D.

## 2022-07-01 NOTE — PERIOPERATIVE NURSING NOTE
Call placed to 10th floor and attempted to give report. Nurse stated that she would call back in approx 5 min as they are in the middle of shift change.     x2 rings returned to patient and placed on his fingers.

## 2022-07-01 NOTE — BRIEF OP NOTE
Brief Op Note:    Ralph Giron  6/29/2022 - 6/30/2022    Pre-op Diagnosis     * Closed displaced fracture of left clavicle [S42.002A]       Post-op Diagnosis     * Closed displaced fracture of left clavicle [S42.002A]    Procedures:    * OPEN REDUCTION INTERNAL FIXATION CLAVICLE    Surgeon(s):  Vadim Gee MD    Anesthesia:  Anesthesiologist: Vadim Howell MD; Les Stewart MD  CRNA: Marino Holland CRNA; Sidra Joyce CRNA  General      Pain Block:  Not requested    Staff:   Circulator: Gosia Thompson RN  Radiology Technologist: RT Clotilde  Relief Circulator: Madeleine Fregoso RN  Scrub Person: Ashlie Willis    Estimated Blood Loss:  100 mL    Specimens:  No specimens collected during this procedure.      Drains:  * No LDAs found *    Complications:  None    Was Aseptic Technique ever compromised? No  Was there an issue or concern during the procedure? No  Document issues or concerns in the comments below.  Comments:  Anatomic reduction obtained.  Lag screw fixation was unsuccessful due to trajectory and interference with the plate.  Stabilized with Synthes 2.7 mm precontoured clavicle locking plate, with 3 cortical screws and 2 locking screws placed on each side of the fracture.  Excellent overall alignment and stability obtained.  Nonweightbearing 6 weeks, as well as avoiding overhead motion.

## 2022-07-01 NOTE — NURSING END OF SHIFT
Nursing End of Shift Summary:    Patient: Ralph Giron  MRN: [id number]  : 1950, Age: 72 y.o.    Location: [address] [place] [address]    Nursing Goals  Clinical Goals for the Shift: pain mgmt, monitor vs/cms, npo/or today    Narrative Summary of Progress Toward Clinical Goals:  Came back from surgery around , doing well. CMS to NAZ intact, Safety and comfort maintained. Pain controlled. Wants his home BP meds resumed. BP has been running high, Provider notified, gave hydralazine 10mg with some good effect.    Barriers to Goals/Nursing Concerns:  Resume home BP meds  PT/OT eval    New Patient or Family Concerns/Issues:  No    Shift Summary:   Significant Events & Communications to Providers (last 12 hours)     Last 5 Values     Row Name 22 0000 22 0100                Provider Notification    Reason for Communication Other (Comment)  high BP  -AN Other (Comment)  high Bp  -AN       Provider Name Dr Leatha ANDERSON --  Dr Valeriano ANDERSON             User Key  (r) = Recorded By, (t) = Taken By, (c) = Cosigned By    Initials Name    CB Wilcox, RN            Oxygen Usage (last 12 hours)     Last 5 Values    No documentation.             Mobility (last 12 hours)     Last 5 Values     Row Name 22 2100 22 2106 22 0000             Mobility    Patient Position Supine Supine Supine      Turning Turns self;Pillow support Turns self Turns self      Anti-Embolism Devices Applied -- Bilateral;AE calf pump --                Urethral Catheter     Active Urethral Catheter     None            Active Lines     Active Central venous catheter / Peripherally inserted central catheter / Implantable Port / Hemodialysis catheter / Midline Catheter     None              Infusing Medications   Medication Dose Last Rate   • LR  100 mL/hr 100 mL/hr (22 1436)   • dextrose 5 % and sodium chloride 0.9% with KCL  100 mL/hr 100 mL/hr (22 0421)     PRN Medications   Medication Dose Last Admin   •  hydrALAZINE  10 mg 10 mg at 07/01/22 0151   • fentaNYL citrate (PF)  50 mcg 50 mcg at 06/30/22 1232   • sodium chloride  3 mL     • acetaminophen  325-650 mg 650 mg at 06/29/22 1941   • oxyCODONE  5-10 mg 10 mg at 06/29/22 1740   • HYDROmorphone  0.4-0.6 mg 0.5 mg at 06/29/22 1015   • ondansetron  4 mg      Or   • ondansetron  4 mg 4 mg at 06/29/22 1014   • alum-mag hydroxide-simeth  30 mL       _________________________  Mariana Wilcox RN  07/01/22 5:12 AM
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
## 2022-07-02 ENCOUNTER — APPOINTMENT (OUTPATIENT)
Dept: RADIOLOGY | Facility: HOSPITAL | Age: 72
DRG: 982 | End: 2022-07-02
Payer: MEDICARE

## 2022-07-02 LAB
ALBUMIN SERPL-MCNC: 3.3 G/DL (ref 3.5–5.3)
ALBUMIN SERPL-MCNC: 3.5 G/DL (ref 3.5–5.3)
ALP SERPL-CCNC: 66 U/L (ref 45–115)
ALT SERPL-CCNC: 7 U/L (ref 7–52)
ANION GAP SERPL CALC-SCNC: 8 MMOL/L (ref 3–11)
ANION GAP SERPL CALC-SCNC: 9 MMOL/L (ref 3–11)
AST SERPL-CCNC: 32 U/L
BASE EXCESS BLDA CALC-SCNC: -1.8 MMOL/L (ref -2–2)
BILIRUB SERPL-MCNC: 0.75 MG/DL (ref 0.2–1.4)
BUN SERPL-MCNC: 23 MG/DL (ref 7–25)
BUN SERPL-MCNC: 24 MG/DL (ref 7–25)
CALCIUM ALBUM COR SERPL-MCNC: 8.9 MG/DL (ref 8.6–10.3)
CALCIUM ALBUM COR SERPL-MCNC: 8.9 MG/DL (ref 8.6–10.3)
CALCIUM SERPL-MCNC: 8.3 MG/DL (ref 8.6–10.3)
CALCIUM SERPL-MCNC: 8.3 MG/DL (ref 8.6–10.3)
CALCIUM SERPL-MCNC: 8.5 MG/DL (ref 8.6–10.3)
CHLORIDE SERPL-SCNC: 104 MMOL/L (ref 98–107)
CHLORIDE SERPL-SCNC: 106 MMOL/L (ref 98–107)
CO2 BLDA-SCNC: 21.7 MMOL/L (ref 19–24)
CO2 SERPL-SCNC: 22 MMOL/L (ref 21–32)
CO2 SERPL-SCNC: 24 MMOL/L (ref 21–32)
CREAT SERPL-MCNC: 1.56 MG/DL (ref 0.7–1.3)
CREAT SERPL-MCNC: 1.76 MG/DL (ref 0.7–1.3)
ERYTHROCYTE [DISTWIDTH] IN BLOOD BY AUTOMATED COUNT: 15.9 % (ref 11.5–15)
FLOW: 1 L/M
GFR SERPL CREATININE-BSD FRML MDRD: 40 ML/MIN/1.73M*2
GFR SERPL CREATININE-BSD FRML MDRD: 47 ML/MIN/1.73M*2
GLUCOSE SERPL-MCNC: 146 MG/DL (ref 70–105)
GLUCOSE SERPL-MCNC: 149 MG/DL (ref 70–105)
HCO3 BLDA-SCNC: 23.7 MMOL/L (ref 23–29)
HCT VFR BLD AUTO: 34.3 % (ref 38–50)
HGB BLD-MCNC: 11.3 G/DL (ref 13.2–17.2)
MCH RBC QN AUTO: 27.2 PG (ref 29–34)
MCHC RBC AUTO-ENTMCNC: 32.8 G/DL (ref 32–36)
MCV RBC AUTO: 82.9 FL (ref 82–97)
PCO2 BLDA: 42.4 MMHG (ref 35–45)
PH BLDA: 7.36 PH (ref 7.35–7.45)
PLATELET # BLD AUTO: 132 10*3/UL (ref 130–350)
PLATELET CLUMP BLD QL SMEAR: NORMAL
PLATELET MORPHOLOGY IN BLOOD: NORMAL
PMV BLD AUTO: 7.6 FL (ref 6.9–10.8)
PO2 BLDA: 62.5 MMHG (ref 60–80)
POCT CTO2, ARTERIAL: 16.4 ML/DL (ref 15–24)
POCT HEMOGLOBIN (MEASURED), ARTERIAL: 12.9 G/DL (ref 13.2–17.2)
POCT OXYHEMOGLOBIN, ARTERIAL: 89.7 %
POTASSIUM SERPL-SCNC: 4.4 MMOL/L (ref 3.5–5.1)
POTASSIUM SERPL-SCNC: 4.7 MMOL/L (ref 3.5–5.1)
PROT SERPL-MCNC: 6.1 G/DL (ref 6–8.3)
RBC # BLD AUTO: 4.14 10*6/ΜL (ref 4.1–5.8)
RBC MORPH BLD: NORMAL
RR TOTAL (RT): 16 B/MIN
SARS-COV-2 RNA RESP QL NAA+PROBE: NEGATIVE
SODIUM SERPL-SCNC: 136 MMOL/L (ref 135–145)
SODIUM SERPL-SCNC: 137 MMOL/L (ref 135–145)
SPO2 (RT): 90 %
WBC # BLD AUTO: 12.1 10*3/UL (ref 3.7–9.6)

## 2022-07-02 PROCEDURE — 80048 BASIC METABOLIC PNL TOTAL CA: CPT | Mod: NCB | Performed by: NURSE PRACTITIONER

## 2022-07-02 PROCEDURE — 97116 GAIT TRAINING THERAPY: CPT | Mod: GP | Performed by: PHYSICAL THERAPIST

## 2022-07-02 PROCEDURE — U0005 INFEC AGEN DETEC AMPLI PROBE: HCPCS | Performed by: NURSE PRACTITIONER

## 2022-07-02 PROCEDURE — 6360000200 HC RX 636 W HCPCS (ALT 250 FOR IP): Performed by: NURSE PRACTITIONER

## 2022-07-02 PROCEDURE — 6360000200 HC RX 636 W HCPCS (ALT 250 FOR IP): Performed by: SURGERY

## 2022-07-02 PROCEDURE — 94799 UNLISTED PULMONARY SVC/PX: CPT

## 2022-07-02 PROCEDURE — 36415 COLL VENOUS BLD VENIPUNCTURE: CPT | Performed by: NURSE PRACTITIONER

## 2022-07-02 PROCEDURE — (BLANK) HC ROOM PRIVATE

## 2022-07-02 PROCEDURE — 94640 AIRWAY INHALATION TREATMENT: CPT

## 2022-07-02 PROCEDURE — 94761 N-INVAS EAR/PLS OXIMETRY MLT: CPT

## 2022-07-02 PROCEDURE — 99232 SBSQ HOSP IP/OBS MODERATE 35: CPT | Performed by: NURSE PRACTITIONER

## 2022-07-02 PROCEDURE — 82805 BLOOD GASES W/O2 SATURATION: CPT

## 2022-07-02 PROCEDURE — 94660 CPAP INITIATION&MGMT: CPT

## 2022-07-02 PROCEDURE — 36600 WITHDRAWAL OF ARTERIAL BLOOD: CPT

## 2022-07-02 PROCEDURE — 80053 COMPREHEN METABOLIC PANEL: CPT | Performed by: NURSE PRACTITIONER

## 2022-07-02 PROCEDURE — C9803 HOPD COVID-19 SPEC COLLECT: HCPCS | Performed by: NURSE PRACTITIONER

## 2022-07-02 PROCEDURE — 2590000100 HC RX 259: Performed by: NURSE PRACTITIONER

## 2022-07-02 PROCEDURE — 6370000100 HC RX 637 (ALT 250 FOR IP): Performed by: NURSE PRACTITIONER

## 2022-07-02 PROCEDURE — 71046 X-RAY EXAM CHEST 2 VIEWS: CPT

## 2022-07-02 PROCEDURE — 71045 X-RAY EXAM CHEST 1 VIEW: CPT

## 2022-07-02 PROCEDURE — 85027 COMPLETE CBC AUTOMATED: CPT | Performed by: NURSE PRACTITIONER

## 2022-07-02 RX ORDER — SODIUM CHLORIDE 9 MG/ML
1000 INJECTION, SOLUTION INTRAVENOUS ONCE
Status: DISCONTINUED | OUTPATIENT
Start: 2022-07-02 | End: 2022-07-06 | Stop reason: HOSPADM

## 2022-07-02 RX ORDER — LIDOCAINE 50 MG/G
2 PATCH TOPICAL DAILY
Qty: 60 PATCH | Refills: 0 | Status: SHIPPED | OUTPATIENT
Start: 2022-07-02 | End: 2022-08-01

## 2022-07-02 RX ORDER — GABAPENTIN 300 MG/1
300 CAPSULE ORAL 3 TIMES DAILY
Qty: 42 CAPSULE | Refills: 0 | Status: SHIPPED | OUTPATIENT
Start: 2022-07-02 | End: 2022-07-12 | Stop reason: HOSPADM

## 2022-07-02 RX ORDER — ACETAMINOPHEN 325 MG/1
325-650 TABLET ORAL EVERY 4 HOURS PRN
Start: 2022-07-02 | End: 2022-07-12

## 2022-07-02 RX ORDER — ALBUTEROL SULFATE 0.83 MG/ML
2.5 SOLUTION RESPIRATORY (INHALATION) EVERY 4 HOURS PRN
Status: DISCONTINUED | OUTPATIENT
Start: 2022-07-02 | End: 2022-07-06 | Stop reason: HOSPADM

## 2022-07-02 RX ORDER — OXYCODONE HYDROCHLORIDE 5 MG/1
5-10 TABLET ORAL EVERY 6 HOURS PRN
Qty: 12 TABLET | Refills: 0 | Status: SHIPPED | OUTPATIENT
Start: 2022-07-02 | End: 2022-07-05

## 2022-07-02 RX ORDER — FUROSEMIDE 10 MG/ML
40 INJECTION INTRAMUSCULAR; INTRAVENOUS ONCE
Status: COMPLETED | OUTPATIENT
Start: 2022-07-02 | End: 2022-07-02

## 2022-07-02 RX ORDER — IBUPROFEN 600 MG/1
600 TABLET ORAL
Start: 2022-07-02 | End: 2022-07-12 | Stop reason: HOSPADM

## 2022-07-02 RX ORDER — CYCLOBENZAPRINE HCL 10 MG
10 TABLET ORAL 3 TIMES DAILY
Qty: 30 TABLET | Refills: 0 | Status: SHIPPED | OUTPATIENT
Start: 2022-07-02 | End: 2022-07-12

## 2022-07-02 RX ADMIN — BUPROPION HYDROCHLORIDE 150 MG: 150 TABLET, EXTENDED RELEASE ORAL at 09:16

## 2022-07-02 RX ADMIN — ATENOLOL 50 MG: 50 TABLET ORAL at 09:16

## 2022-07-02 RX ADMIN — GABAPENTIN 300 MG: 300 CAPSULE ORAL at 14:05

## 2022-07-02 RX ADMIN — OXYCODONE HYDROCHLORIDE 5 MG: 5 TABLET ORAL at 09:16

## 2022-07-02 RX ADMIN — OXYCODONE HYDROCHLORIDE 5 MG: 5 TABLET ORAL at 14:05

## 2022-07-02 RX ADMIN — DICYCLOMINE HYDROCHLORIDE 20 MG: 10 CAPSULE ORAL at 14:05

## 2022-07-02 RX ADMIN — ATORVASTATIN CALCIUM 10 MG: 10 TABLET, FILM COATED ORAL at 20:59

## 2022-07-02 RX ADMIN — VENLAFAXINE HYDROCHLORIDE 225 MG: 75 CAPSULE, EXTENDED RELEASE ORAL at 09:16

## 2022-07-02 RX ADMIN — CYCLOBENZAPRINE 10 MG: 10 TABLET, FILM COATED ORAL at 14:05

## 2022-07-02 RX ADMIN — GABAPENTIN 300 MG: 300 CAPSULE ORAL at 20:59

## 2022-07-02 RX ADMIN — SENNOSIDES AND DOCUSATE SODIUM 1 TABLET: 50; 8.6 TABLET ORAL at 20:58

## 2022-07-02 RX ADMIN — PRAZOSIN HYDROCHLORIDE 6 MG: 1 CAPSULE ORAL at 20:59

## 2022-07-02 RX ADMIN — FUROSEMIDE 40 MG: 10 INJECTION, SOLUTION INTRAMUSCULAR; INTRAVENOUS at 18:15

## 2022-07-02 RX ADMIN — CYCLOBENZAPRINE 10 MG: 10 TABLET, FILM COATED ORAL at 20:58

## 2022-07-02 RX ADMIN — IBUPROFEN 600 MG: 600 TABLET ORAL at 14:05

## 2022-07-02 RX ADMIN — SENNOSIDES AND DOCUSATE SODIUM 1 TABLET: 50; 8.6 TABLET ORAL at 09:16

## 2022-07-02 RX ADMIN — FLUTICASONE PROPIONATE 2 SPRAY: 50 SPRAY, METERED NASAL at 09:17

## 2022-07-02 RX ADMIN — SPIRONOLACTONE 50 MG: 50 TABLET ORAL at 09:17

## 2022-07-02 RX ADMIN — DICYCLOMINE HYDROCHLORIDE 20 MG: 10 CAPSULE ORAL at 20:58

## 2022-07-02 RX ADMIN — IBUPROFEN 600 MG: 600 TABLET ORAL at 17:58

## 2022-07-02 RX ADMIN — IBUPROFEN 600 MG: 600 TABLET ORAL at 09:16

## 2022-07-02 RX ADMIN — GABAPENTIN 300 MG: 300 CAPSULE ORAL at 09:16

## 2022-07-02 RX ADMIN — IBUPROFEN 600 MG: 600 TABLET ORAL at 20:59

## 2022-07-02 RX ADMIN — POLYETHYLENE GLYCOL 3350 17 G: 17 POWDER, FOR SOLUTION ORAL at 09:17

## 2022-07-02 RX ADMIN — ENOXAPARIN SODIUM 40 MG: 100 INJECTION SUBCUTANEOUS at 14:05

## 2022-07-02 RX ADMIN — TRAZODONE HYDROCHLORIDE 100 MG: 100 TABLET ORAL at 20:59

## 2022-07-02 RX ADMIN — LANSOPRAZOLE 30 MG: 30 CAPSULE, DELAYED RELEASE ORAL at 09:16

## 2022-07-02 RX ADMIN — DICYCLOMINE HYDROCHLORIDE 20 MG: 10 CAPSULE ORAL at 09:16

## 2022-07-02 RX ADMIN — CYCLOBENZAPRINE 10 MG: 10 TABLET, FILM COATED ORAL at 09:16

## 2022-07-02 RX ADMIN — LIDOCAINE 2 PATCH: 50 PATCH TOPICAL at 09:17

## 2022-07-02 RX ADMIN — ALBUTEROL SULFATE 2.5 MG: 2.5 SOLUTION RESPIRATORY (INHALATION) at 14:22

## 2022-07-02 NOTE — NURSING END OF SHIFT
Nursing End of Shift Summary:    Patient: Ralph Giron  MRN: [id number]  : 1950, Age: 72 y.o.    Location: [address] [place] [address]    Nursing Goals  Clinical Goals for the Shift: pain safety/comfort, d/c home    Narrative Summary of Progress Toward Clinical Goals:  Patient aox4, pain controlled well w/ oral medications, ambulated in room and anne/tolerated well, pain overall improved. Pt reports increased sob/, labored breathing observed, productive cough. Breath sounds w/ wheezes and rhonchi at times. Providers notified and d/c home rescinded. Wife and patient vocalized concerns w/ d/c and wife requests rehab for patient as she is concerned w/ home RV and ability to mobilize and manage pain and resp status. Case mgmt consulted and zaheer provider in to see patient several times to discuss concerns. Pt remained on room air, cpap changed from nasal to full mask cpap per RT. Nebulizer given.     Barriers to Goals/Nursing Concerns:  resp status/ d/c planning    New Patient or Family Concerns/Issues:  See above     Shift Summary:   Significant Events & Communications to Providers (last 12 hours)     Last 5 Values     Provider Notification     Row Name 22 1100    Reason for Communication Change in status (Comment) pt reports sob/, productive cough/ wheezes  -Recorded by: BHARATH    Provider Name alber phillip  -Recorded by: BHARATH                User Key     Initials Name    BHARATH Fletcher RN            Oxygen Usage (last 12 hours)     Last 5 Values     Oxygen Weaning Trial by Nursing     Row Name 22 0430 22 0800    Is Patient on Room Air OR on the Same Amount of O2 as at Home? No No                  Mobility (last 12 hours)     Last 5 Values     Mobility     Row Name 22 0749 22 0800 22 1130 22 1600    Activity no documentation no documentation Ambulate in room;Ambulate in anne no documentation    Level of Assistance no documentation no documentation (comments only) (Comment) see  therapy notes no documentation    Patient Position Supine Supine no documentation Supine    Turning no documentation Turns self no documentation no documentation    Anti-Embolism Devices Applied no documentation Bilateral;AE calf pump no documentation no documentation                    Urethral Catheter     Active Urethral Catheter     None            Active Lines     Active Central venous catheter / Peripherally inserted central catheter / Implantable Port / Hemodialysis catheter / Midline Catheter     None              Infusing Medications   Medication Dose Last Rate     PRN Medications   Medication Dose Last Admin   • albuterol  2.5 mg 2.5 mg at 07/02/22 1422   • hydrALAZINE  10 mg 10 mg at 07/01/22 1318   • fentaNYL citrate (PF)  50 mcg 50 mcg at 07/01/22 1916   • sodium chloride  3 mL     • acetaminophen  325-650 mg 650 mg at 07/01/22 0901   • oxyCODONE  5-10 mg 5 mg at 07/02/22 1405   • HYDROmorphone  0.4-0.6 mg 0.5 mg at 06/29/22 1015   • ondansetron  4 mg      Or   • ondansetron  4 mg 4 mg at 06/29/22 1014   • alum-mag hydroxide-simeth  30 mL       _________________________  JOSE MINOR RN  07/02/22 4:26 PM

## 2022-07-02 NOTE — NURSING END OF SHIFT
Nursing End of Shift Summary:    Patient: Ralph Giron  MRN: [id number]  : 1950, Age: 72 y.o.    Location: [address] [place] [address]    Nursing Goals  Clinical Goals for the Shift: monitor vs/tele, pain mgmt    Narrative Summary of Progress Toward Clinical Goals:  CMS to NAZ intact, Safety and comfort maintained. Pain controlled. coughed up about 200ml of undigested food, from 2PM to 7PM. Provider paged, ordered chest xray. Slept well   Barriers to Goals/Nursing Concerns:  No    New Patient or Family Concerns/Issues:  No    Shift Summary:   Significant Events & Communications to Providers (last 12 hours)     Last 5 Values     Row Name 22                   Provider Notification    Reason for Communication --  Expiratory wheezing and pt coughing up some undigested food.  -AN        Provider Name --  Dr Gerard Fierro  -CB              User Key  (r) = Recorded By, (t) = Taken By, (c) = Cosigned By    Initials Name    AN Mariana Wilcox, RN            Oxygen Usage (last 12 hours)     Last 5 Values     Row Name 22 0430                Oxygen Weaning Trial by Nursing    Is Patient on Room Air OR on the Same Amount of O2 as at Home? No No       Are You Performing the QShift O2 Weaning Trial? Yes --       O2 Setting at Start of Trial Room Air --       SpO2 During Trial 85 % --       O2 Flow Rate L/min After Trial 2 lpm --       SpO2 After Trial 92 % --               Mobility (last 12 hours)     Last 5 Values     Row Name 22 2311             Mobility    Activity -- Bathroom privileges --      Level of Assistance -- Standby assist, set-up cues, supervision of patient - no hands on --      Distance Ambulated (feet) -- 5 Feet --      Distance Ambulated (Meters Calculated) -- 1.52 Meters --      Patient Position Supine Supine Supine      Turning -- Turns self --      Distance Ambulated (Meters Calculated)(Do Not Use) -- 1.52 Feet --      Anti-Embolism Devices Applied --  Bilateral;AE calf pump --                Urethral Catheter     Active Urethral Catheter     None            Active Lines     Active Central venous catheter / Peripherally inserted central catheter / Implantable Port / Hemodialysis catheter / Midline Catheter     None              Infusing Medications   Medication Dose Last Rate     PRN Medications   Medication Dose Last Admin   • hydrALAZINE  10 mg 10 mg at 07/01/22 1318   • fentaNYL citrate (PF)  50 mcg 50 mcg at 07/01/22 1916   • sodium chloride  3 mL     • acetaminophen  325-650 mg 650 mg at 07/01/22 0901   • oxyCODONE  5-10 mg 5 mg at 07/01/22 1736   • HYDROmorphone  0.4-0.6 mg 0.5 mg at 06/29/22 1015   • ondansetron  4 mg      Or   • ondansetron  4 mg 4 mg at 06/29/22 1014   • alum-mag hydroxide-simeth  30 mL       _________________________  Mariana Wilcox RN  07/02/22 5:59 AM

## 2022-07-02 NOTE — PLAN OF CARE
Problem: Neurosensory - Adult  Goal: Achieves stable or improved neurological status  Description: INTERVENTIONS  1. Assess for and report changes in neurological status  2. Initiate measures to prevent increased intracranial pressure  3. Maintain blood pressure and fluid volume within ordered parameters to optimize cerebral perfusion and minimize risk of hemorrhage  4. Monitor temperature, glucose, and sodium. Initiate appropriate interventions as ordered  Outcome: Progressing  Goal: Achieves maximal functionality and self care  Description: INTERVENTIONS  1. Monitor swallowing and airway patency with patient fatigue and changes in neurological status  2. Encourage and assist patient to increase activity and self care with guidance from PT/OT  3. Encourage visually impaired, hearing impaired and aphasic patients to use assistive/communication devices  Outcome: Progressing     Problem: Musculoskeletal - Adult  Goal: Return mobility to safest level of function  Description: INTERVENTIONS:  1. Assess patient stability and activity tolerance for standing, transferring and ambulating w/ or w/o assistive devices  2. Assist with transfers and ambulation using safe practices  3. Ensure adequate protection for wounds/incisions during mobilization  4. Obtain PT/OT and other consults as needed  5. Apply Continuous Passive Motion per order to increase flexion toward goal  6. Instruct patient/family in ordered activity level  Outcome: Progressing  Goal: Maintain proper alignment of affected body part  Description: INTERVENTIONS:  1. Support and protect limb and body alignment per provider order  2. Instruct and reinforce with patient and family use of appropriate assistive device and precautions (e.g. spinal or hip dislocation precautions)  Outcome: Progressing  Goal: Return ADL status to a safe level of function  Description: INTERVENTIONS:  1. Assess patient's ADL deficits and provide assistive devices as needed  2. Obtain  PT/OT consults as needed  3. Assist and instruct patient to increase activity and self care  Outcome: Progressing     Problem: Safety Adult - Fall  Goal: Free from fall injury  Description: INTERVENTIONS:    Inpatient - Please reference Cares/Safety Flowsheet under [facility] Fall Risk for interventions.  Pediatrics - Please reference Peds Daily Cares/Safety Flowsheet under [facility] Pediatric Fall Assessment Fall Bundle for interventions  LD/OB - Please reference OB Shift Screening Flowsheet under OB Fall Risk for interventions.  Outcome: Progressing     Problem: Knowledge Deficit  Goal: Patient/family/caregiver demonstrates understanding of disease process, treatment plan, medications, and discharge instructions  Description: INTERVENTIONS:   1. Complete learning assessment and assess knowledge base  2. Provide teaching at level of understanding   3. Provide teaching via preferred learning methods  Outcome: Progressing     Problem: Potential for Compromised Skin Integrity  Goal: Skin Integrity is Maintained or Improved  Description: INTERVENTIONS:  1. Assess and monitor skin integrity  2. Collaborate with interdisciplinary team and initiate plans and interventions as needed  3. Alternate a full bath with partial baths for elderly   4. Monitor patient's hygiene practices   5. Collaborate with wound, ostomy, and continence nurse  Outcome: Progressing  Goal: Nutritional status is improving  Description: INTERVENTIONS:  1. Monitor and assess patient for malnutrition (ex- brittle hair, bruises, dry skin, pale skin and conjunctiva, muscle wasting, smooth red tongue, and disorientation)  2. Monitor patient's weight and dietary intake as ordered or per policy  3. Determine patient's food preferences and provide high-protein, high-caloric foods as appropriate  4. Assist patient with eating   5. Allow adequate time for meals   6. Encourage patient to take dietary supplement as ordered   7. Collaborate with dietitian  8. Include  patient/family/caregiver in decisions related to nutrition  Outcome: Progressing  Goal: MOBILITY IS MAINTAINED OR IMPROVED  Description: INTERVENTIONS  1. Collaborate with interdisciplinary team and initiate plan and interventions as ordered (PT/OT)  2. Encourage ambulation  3. Up to chair for meals  4. Monitor for signs of deconditioning  Outcome: Progressing     Problem: Urinary Incontinence  Goal: Perineal skin integrity is maintained or improved  Description: INTERVENTIONS:  1. Assess genitourinary system, perineal skin, labs (urinalysis), and history of incontinence to include past management, aggravating, and alleviating factors  2. Collaborate with interdisciplinary team including wound, ostomy, and continence nurse and initiate plans and interventions as needed  4. Consider urine containment device  5. Apply skin protectant   6. Develop skin care regimen  7. Provide privacy when changing patient's incontinence device to maintain their dignity  Outcome: Progressing     Problem: Dressing Upper Extremities  Goal: LTG - Patient will utilize adaptive techniques/equipment to dress upper body  Description: W/ min A  Outcome: Progressing     Problem: Mobility  Goal: STG - Patient will ambulate  Outcome: Progressing     Problem: TRANSFERS  Goal: STG - Patient will transfer to and from sit to stand  Outcome: Progressing  Goal: STG - Patient will perform bed mobility  Outcome: Progressing

## 2022-07-02 NOTE — NURSING END OF SHIFT
Nursing End of Shift Summary:    Patient: Ralph Giron  MRN: [id number]  : 1950, Age: 72 y.o.    Location: [address] [place] [address]    Nursing Goals  Clinical Goals for the Shift: monitor vs/tele, pain mgmt    Narrative Summary of Progress Toward Clinical Goals:  Patient aox4, pain improved throughout shift, only severe when working w/ ptot, given iv fentanyl once post ptot, improved pain. Pt remains on room air, IS initiated q 1 hr x 10 while awake, achieved 1000 ml max for shift. Pt now w/ productive cough, sputum not assessed, reports improved breathing. Pt ambulated several times, once in anne. Adequate urine output. Tolerated reg idet    Barriers to Goals/Nursing Concerns:  None, hopeful to d/c home     New Patient or Family Concerns/Issues:  none    Shift Summary:   Significant Events & Communications to Providers (last 12 hours)     Last 5 Values    No documentation.             Oxygen Usage (last 12 hours)     Last 5 Values     Oxygen Weaning Trial by Nursing     Row Name 22 0751    Is Patient on Room Air OR on the Same Amount of O2 as at Home? No                  Mobility (last 12 hours)     Last 5 Values     Mobility     Row Name 22 0751 22 0800 22 1600    Activity Turn no documentation no documentation    Patient Position Supine Sitting Supine    Turning Turns self no documentation no documentation    Anti-Embolism Devices Applied Bilateral;AE calf pump no documentation no documentation                    Urethral Catheter     Active Urethral Catheter     None            Active Lines     Active Central venous catheter / Peripherally inserted central catheter / Implantable Port / Hemodialysis catheter / Midline Catheter     None              Infusing Medications   Medication Dose Last Rate     PRN Medications   Medication Dose Last Admin   • hydrALAZINE  10 mg 10 mg at 22 1318   • fentaNYL citrate (PF)  50 mcg 50 mcg at 22 1111   • sodium chloride  3 mL     • acetaminophen   325-650 mg 650 mg at 07/01/22 0901   • oxyCODONE  5-10 mg 5 mg at 07/01/22 1736   • HYDROmorphone  0.4-0.6 mg 0.5 mg at 06/29/22 1015   • ondansetron  4 mg      Or   • ondansetron  4 mg 4 mg at 06/29/22 1014   • alum-mag hydroxide-simeth  30 mL       _________________________  JOSE MINOR RN  07/01/22 6:20 PM

## 2022-07-02 NOTE — DISCHARGE INSTRUCTIONS
ORTHOPEDIC RECOMMENDATIONS:  --Remain nonweightbearing (no lifting, pushing, pulling, carrying) to left upper extremity until cleared by Orthopedics.  --Staples/Sutures removed on or about post-op day #14, replace with steri-strips.  --Continue current dressing to left shoulder for one week following surgery.  --Ice swollen area as needed for no more than 20 minutes per hour.   --Elevate above heart level for at least 20 minutes (if allowed) to minimize swelling/pain.  --Follow-up in approximately 2 weeks from time of surgery at [facility]'s clinic located at [facility] (2nd Floor).  --Monitor for fevers, chills, sweats, increased pain at the surgical site associated with increased swelling and/or redness that is not relieved with elevation as instructed. If concerns arise, please notify [facility] at [phone].      Trauma Surgery  -Tylenol and ibuprofen for pain  - Lidocaine patches as needed for pain  - Gabapentin   - Flexeril as needed  - Follow-up with orthopedic surgeon as directed by them  -Any questions or concerns please call our [facility] office at [phone]  -No flying or scuba diving for 6 weeks due to the minimal pneumothorax that has now resolved  -If any increase of shortness of breath or chest pain please report to the nearest emergency room
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
## 2022-07-02 NOTE — INTERDISCIPLINARY/THERAPY
07/02/22 1100   O2 Determination   Activity Resting;Exercise   $ Multiple SpO2 Levels Obtained? - RT Charge Only Yes   O2 Determination Activity: Resting   Resting Lowest SPO2 (%) on Room Air 93 %   O2 Determination Activity: Exercise   Exercise Lowest SPO2 (%) on Room Air 86 %   Exercise Oxygen Delivery Interface Nasal cannula   Exercise Oxygen Requirement (lpm) 1 lpm   Exercise SPO2 (%) on Final Need of Oxygen 92 %   Exercise Comment pt. requires 1L with activity

## 2022-07-02 NOTE — INTERDISCIPLINARY/THERAPY
07/02/22 1100   O2 Determination   Activity Resting;Exercise   $ Multiple SpO2 Levels Obtained? - RT Charge Only Yes   O2 Determination Activity: Resting   Resting Lowest SPO2 (%) on Room Air 93 %   O2 Determination Activity: Exercise   Exercise Lowest SPO2 (%) on Room Air 86 %   Exercise Oxygen Delivery Interface Nasal cannula   Exercise Oxygen Requirement (lpm) 1 lpm   Exercise Comment pt. requires 1L with activity
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
## 2022-07-02 NOTE — PLAN OF CARE
Problem: Neurosensory - Adult  Goal: Achieves stable or improved neurological status  Description: INTERVENTIONS  1. Assess for and report changes in neurological status  2. Initiate measures to prevent increased intracranial pressure  3. Maintain blood pressure and fluid volume within ordered parameters to optimize cerebral perfusion and minimize risk of hemorrhage  4. Monitor temperature, glucose, and sodium. Initiate appropriate interventions as ordered  Outcome: Progressing  Goal: Achieves maximal functionality and self care  Description: INTERVENTIONS  1. Monitor swallowing and airway patency with patient fatigue and changes in neurological status  2. Encourage and assist patient to increase activity and self care with guidance from PT/OT  3. Encourage visually impaired, hearing impaired and aphasic patients to use assistive/communication devices  Outcome: Progressing     Problem: Musculoskeletal - Adult  Goal: Return mobility to safest level of function  Description: INTERVENTIONS:  1. Assess patient stability and activity tolerance for standing, transferring and ambulating w/ or w/o assistive devices  2. Assist with transfers and ambulation using safe practices  3. Ensure adequate protection for wounds/incisions during mobilization  4. Obtain PT/OT and other consults as needed  5. Apply Continuous Passive Motion per order to increase flexion toward goal  6. Instruct patient/family in ordered activity level  Outcome: Progressing  Goal: Maintain proper alignment of affected body part  Description: INTERVENTIONS:  1. Support and protect limb and body alignment per provider order  2. Instruct and reinforce with patient and family use of appropriate assistive device and precautions (e.g. spinal or hip dislocation precautions)  Outcome: Progressing  Goal: Return ADL status to a safe level of function  Description: INTERVENTIONS:  1. Assess patient's ADL deficits and provide assistive devices as needed  2. Obtain  PT/OT consults as needed  3. Assist and instruct patient to increase activity and self care  Outcome: Progressing     Problem: Safety Adult - Fall  Goal: Free from fall injury  Description: INTERVENTIONS:    Inpatient - Please reference Cares/Safety Flowsheet under [facility] Fall Risk for interventions.  Pediatrics - Please reference Peds Daily Cares/Safety Flowsheet under [facility] Pediatric Fall Assessment Fall Bundle for interventions  LD/OB - Please reference OB Shift Screening Flowsheet under OB Fall Risk for interventions.  Outcome: Progressing     Problem: Knowledge Deficit  Goal: Patient/family/caregiver demonstrates understanding of disease process, treatment plan, medications, and discharge instructions  Description: INTERVENTIONS:   1. Complete learning assessment and assess knowledge base  2. Provide teaching at level of understanding   3. Provide teaching via preferred learning methods  Outcome: Progressing     Problem: Potential for Compromised Skin Integrity  Goal: Skin Integrity is Maintained or Improved  Description: INTERVENTIONS:  1. Assess and monitor skin integrity  2. Collaborate with interdisciplinary team and initiate plans and interventions as needed  3. Alternate a full bath with partial baths for elderly   4. Monitor patient's hygiene practices   5. Collaborate with wound, ostomy, and continence nurse  Outcome: Progressing  Goal: Nutritional status is improving  Description: INTERVENTIONS:  1. Monitor and assess patient for malnutrition (ex- brittle hair, bruises, dry skin, pale skin and conjunctiva, muscle wasting, smooth red tongue, and disorientation)  2. Monitor patient's weight and dietary intake as ordered or per policy  3. Determine patient's food preferences and provide high-protein, high-caloric foods as appropriate  4. Assist patient with eating   5. Allow adequate time for meals   6. Encourage patient to take dietary supplement as ordered   7. Collaborate with dietitian  8. Include  patient/family/caregiver in decisions related to nutrition  Outcome: Progressing  Goal: MOBILITY IS MAINTAINED OR IMPROVED  Description: INTERVENTIONS  1. Collaborate with interdisciplinary team and initiate plan and interventions as ordered (PT/OT)  2. Encourage ambulation  3. Up to chair for meals  4. Monitor for signs of deconditioning  Outcome: Progressing     Problem: Urinary Incontinence  Goal: Perineal skin integrity is maintained or improved  Description: INTERVENTIONS:  1. Assess genitourinary system, perineal skin, labs (urinalysis), and history of incontinence to include past management, aggravating, and alleviating factors  2. Collaborate with interdisciplinary team including wound, ostomy, and continence nurse and initiate plans and interventions as needed  4. Consider urine containment device  5. Apply skin protectant   6. Develop skin care regimen  7. Provide privacy when changing patient's incontinence device to maintain their dignity  Outcome: Progressing     Problem: Dressing Upper Extremities  Goal: LTG - Patient will utilize adaptive techniques/equipment to dress upper body  Description: W/ min A  Outcome: Progressing     Problem: Mobility  Goal: STG - Patient will ambulate  Outcome: Progressing     Problem: TRANSFERS  Goal: STG - Patient will transfer to and from sit to stand  Outcome: Progressing  Goal: STG - Patient will perform bed mobility  Outcome: Progressing     Problem: Infection Control  Goal: MINIMIZE THE ACQUISITION AND TRANSMISSION OF INFECTIOUS AGENTS  Description: INTERVENTIONS:  1. Isolate patient with suspected/diagnosed communicable disease  2. Place on designated isolation precautions  3. Maintain isolation techniques  4. Perform hand hygiene before and after each patient care activity  5. [place] universal precautions  6. Wear PPE as directed for type of isolation  7. Administer antibiotic therapy as ordered  8. Clean the environment appropriately after each patient use  9.  Clean patient care equipment after each patient use as it leaves the room  10. Limit number of visitors, as appropriate  Outcome: Progressing

## 2022-07-03 ENCOUNTER — APPOINTMENT (OUTPATIENT)
Dept: RADIOLOGY | Facility: HOSPITAL | Age: 72
DRG: 982 | End: 2022-07-03
Payer: MEDICARE

## 2022-07-03 ENCOUNTER — APPOINTMENT (OUTPATIENT)
Dept: ULTRASOUND IMAGING | Facility: HOSPITAL | Age: 72
DRG: 982 | End: 2022-07-03
Payer: MEDICARE

## 2022-07-03 PROBLEM — E66.9 OBESITY (BMI 30-39.9): Chronic | Status: ACTIVE | Noted: 2022-07-03

## 2022-07-03 PROBLEM — S42.002A CLOSED DISPLACED FRACTURE OF LEFT CLAVICLE: Status: ACTIVE | Noted: 2022-07-03

## 2022-07-03 PROBLEM — Z98.84 HISTORY OF ADJUSTABLE GASTRIC BANDING: Chronic | Status: ACTIVE | Noted: 2022-07-03
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
## 2022-07-03 PROBLEM — S42.123A: Status: ACTIVE | Noted: 2022-07-03

## 2022-07-03 PROBLEM — K21.9 GASTROESOPHAGEAL REFLUX DISEASE WITHOUT ESOPHAGITIS: Chronic | Status: ACTIVE | Noted: 2022-07-03

## 2022-07-03 PROBLEM — K58.1 IRRITABLE BOWEL SYNDROME WITH CONSTIPATION: Chronic | Status: ACTIVE | Noted: 2022-07-03

## 2022-07-03 PROBLEM — S42.109A SCAPULA FRACTURE: Status: ACTIVE | Noted: 2022-07-03

## 2022-07-03 PROBLEM — I10 ESSENTIAL HYPERTENSION: Chronic | Status: ACTIVE | Noted: 2022-07-03

## 2022-07-03 PROBLEM — N17.9 ACUTE KIDNEY INJURY (CMS/HCC): Status: ACTIVE | Noted: 2022-07-03

## 2022-07-03 PROBLEM — E78.5 HYPERLIPIDEMIA: Chronic | Status: ACTIVE | Noted: 2022-07-03

## 2022-07-03 PROBLEM — Z87.19 HISTORY OF CELIAC DISEASE: Chronic | Status: ACTIVE | Noted: 2022-07-03

## 2022-07-03 LAB
25(OH)D3 SERPL-MCNC: 57 NG/ML (ref 30–100)
ALBUMIN SERPL-MCNC: 3.6 G/DL (ref 3.5–5.3)
ALP SERPL-CCNC: 76 U/L (ref 45–115)
ALT SERPL-CCNC: 10 U/L (ref 7–52)
ANION GAP SERPL CALC-SCNC: 10 MMOL/L (ref 3–11)
AST SERPL-CCNC: 32 U/L
BILIRUB DIRECT SERPL-MCNC: 0.13 MG/DL
BILIRUB SERPL-MCNC: 0.54 MG/DL (ref 0.2–1.4)
BNP SERPL-MCNC: 49 PG/ML (ref 0–100)
BUN SERPL-MCNC: 33 MG/DL (ref 7–25)
CALCIUM SERPL-MCNC: 8.7 MG/DL (ref 8.6–10.3)
CHLORIDE SERPL-SCNC: 104 MMOL/L (ref 98–107)
CK SERPL-CCNC: 591 U/L (ref 39–308)
CO2 SERPL-SCNC: 24 MMOL/L (ref 21–32)
CREAT SERPL-MCNC: 2.31 MG/DL (ref 0.7–1.3)
CRP SERPL-MCNC: 98.6 MG/L
ERYTHROCYTE [DISTWIDTH] IN BLOOD BY AUTOMATED COUNT: 15.6 % (ref 11.5–15)
ERYTHROCYTE [SEDIMENTATION RATE] IN BLOOD: 44 MM/HR
EST. AVERAGE GLUCOSE BLD GHB EST-MCNC: 125.5 MG/DL
GFR SERPL CREATININE-BSD FRML MDRD: 29 ML/MIN/1.73M*2
GLUCOSE SERPL-MCNC: 138 MG/DL (ref 70–105)
HBA1C MFR BLD: 6 % (ref 4–6)
HCT VFR BLD AUTO: 35.1 % (ref 38–50)
HGB BLD-MCNC: 11.6 G/DL (ref 13.2–17.2)
LIPASE SERPL-CCNC: 13 U/L (ref 11–82)
MAGNESIUM SERPL-MCNC: 2.1 MG/DL (ref 1.8–2.4)
MCH RBC QN AUTO: 27.7 PG (ref 29–34)
MCHC RBC AUTO-ENTMCNC: 33.1 G/DL (ref 32–36)
MCV RBC AUTO: 83.6 FL (ref 82–97)
OSMOLALITY SPEC: 300 MOSM/KG (ref 275–300)
PHOSPHATE SERPL-MCNC: 3.9 MG/DL (ref 2.5–4.9)
PLATELET # BLD AUTO: 157 10*3/UL (ref 130–350)
PMV BLD AUTO: 7 FL (ref 6.9–10.8)
POTASSIUM SERPL-SCNC: 4.2 MMOL/L (ref 3.5–5.1)
PROT SERPL-MCNC: 6.4 G/DL (ref 6–8.3)
RBC # BLD AUTO: 4.2 10*6/ΜL (ref 4.1–5.8)
SODIUM SERPL-SCNC: 138 MMOL/L (ref 135–145)
TSH SERPL DL<=0.05 MIU/L-ACNC: 1.8 UIU/ML (ref 0.34–4.82)
VIT B12 SERPL-MCNC: 239 PG/ML (ref 180–914)
WBC # BLD AUTO: 9 10*3/UL (ref 3.7–9.6)

## 2022-07-03 PROCEDURE — 86140 C-REACTIVE PROTEIN: CPT | Performed by: INTERNAL MEDICINE

## 2022-07-03 PROCEDURE — 83930 ASSAY OF BLOOD OSMOLALITY: CPT | Performed by: INTERNAL MEDICINE

## 2022-07-03 PROCEDURE — 74022 RADEX COMPL AQT ABD SERIES: CPT

## 2022-07-03 PROCEDURE — 6360000200 HC RX 636 W HCPCS (ALT 250 FOR IP): Performed by: INTERNAL MEDICINE

## 2022-07-03 PROCEDURE — 82306 VITAMIN D 25 HYDROXY: CPT | Performed by: INTERNAL MEDICINE

## 2022-07-03 PROCEDURE — 80053 COMPREHEN METABOLIC PANEL: CPT | Performed by: NURSE PRACTITIONER

## 2022-07-03 PROCEDURE — 83690 ASSAY OF LIPASE: CPT | Performed by: INTERNAL MEDICINE

## 2022-07-03 PROCEDURE — 6360000200 HC RX 636 W HCPCS (ALT 250 FOR IP): Performed by: NURSE PRACTITIONER

## 2022-07-03 PROCEDURE — 94660 CPAP INITIATION&MGMT: CPT

## 2022-07-03 PROCEDURE — 82248 BILIRUBIN DIRECT: CPT | Performed by: INTERNAL MEDICINE

## 2022-07-03 PROCEDURE — 94640 AIRWAY INHALATION TREATMENT: CPT

## 2022-07-03 PROCEDURE — 6370000100 HC RX 637 (ALT 250 FOR IP): Performed by: NURSE PRACTITIONER

## 2022-07-03 PROCEDURE — 82550 ASSAY OF CK (CPK): CPT | Performed by: INTERNAL MEDICINE

## 2022-07-03 PROCEDURE — 99232 SBSQ HOSP IP/OBS MODERATE 35: CPT | Performed by: NURSE PRACTITIONER

## 2022-07-03 PROCEDURE — 84443 ASSAY THYROID STIM HORMONE: CPT | Performed by: INTERNAL MEDICINE

## 2022-07-03 PROCEDURE — 85027 COMPLETE CBC AUTOMATED: CPT | Performed by: NURSE PRACTITIONER

## 2022-07-03 PROCEDURE — 2580000300 HC RX 258: Performed by: INTERNAL MEDICINE

## 2022-07-03 PROCEDURE — 82607 VITAMIN B-12: CPT | Performed by: INTERNAL MEDICINE

## 2022-07-03 PROCEDURE — 83880 ASSAY OF NATRIURETIC PEPTIDE: CPT | Performed by: INTERNAL MEDICINE

## 2022-07-03 PROCEDURE — 71045 X-RAY EXAM CHEST 1 VIEW: CPT

## 2022-07-03 PROCEDURE — 83735 ASSAY OF MAGNESIUM: CPT | Performed by: INTERNAL MEDICINE

## 2022-07-03 PROCEDURE — 99223 1ST HOSP IP/OBS HIGH 75: CPT | Performed by: INTERNAL MEDICINE

## 2022-07-03 PROCEDURE — 51798 US URINE CAPACITY MEASURE: CPT

## 2022-07-03 PROCEDURE — 84100 ASSAY OF PHOSPHORUS: CPT | Performed by: INTERNAL MEDICINE

## 2022-07-03 PROCEDURE — 2590000100 HC RX 259: Performed by: NURSE PRACTITIONER

## 2022-07-03 PROCEDURE — 85652 RBC SED RATE AUTOMATED: CPT | Performed by: INTERNAL MEDICINE

## 2022-07-03 PROCEDURE — 36415 COLL VENOUS BLD VENIPUNCTURE: CPT | Performed by: NURSE PRACTITIONER

## 2022-07-03 PROCEDURE — 83036 HEMOGLOBIN GLYCOSYLATED A1C: CPT | Performed by: INTERNAL MEDICINE

## 2022-07-03 PROCEDURE — 76770 US EXAM ABDO BACK WALL COMP: CPT

## 2022-07-03 PROCEDURE — (BLANK) HC ROOM PRIVATE

## 2022-07-03 RX ORDER — BENZONATATE 100 MG/1
200 CAPSULE ORAL 3 TIMES DAILY PRN
Status: DISCONTINUED | OUTPATIENT
Start: 2022-07-03 | End: 2022-07-06 | Stop reason: HOSPADM

## 2022-07-03 RX ORDER — BISACODYL 10 MG/1
10 SUPPOSITORY RECTAL DAILY PRN
Status: DISCONTINUED | OUTPATIENT
Start: 2022-07-03 | End: 2022-07-06 | Stop reason: HOSPADM

## 2022-07-03 RX ORDER — CYCLOBENZAPRINE HCL 10 MG
10 TABLET ORAL 3 TIMES DAILY PRN
Status: DISCONTINUED | OUTPATIENT
Start: 2022-07-03 | End: 2022-07-06 | Stop reason: HOSPADM

## 2022-07-03 RX ORDER — SODIUM CHLORIDE, SODIUM LACTATE, POTASSIUM CHLORIDE, CALCIUM CHLORIDE 600; 310; 30; 20 MG/100ML; MG/100ML; MG/100ML; MG/100ML
125 INJECTION, SOLUTION INTRAVENOUS CONTINUOUS
Status: DISCONTINUED | OUTPATIENT
Start: 2022-07-03 | End: 2022-07-04

## 2022-07-03 RX ORDER — LANOLIN ALCOHOL/MO/W.PET/CERES
1000 CREAM (GRAM) TOPICAL DAILY
Status: DISCONTINUED | OUTPATIENT
Start: 2022-07-04 | End: 2022-07-06 | Stop reason: HOSPADM

## 2022-07-03 RX ORDER — CYANOCOBALAMIN 1000 UG/ML
1000 INJECTION, SOLUTION INTRAMUSCULAR; SUBCUTANEOUS ONCE
Status: COMPLETED | OUTPATIENT
Start: 2022-07-03 | End: 2022-07-03

## 2022-07-03 RX ADMIN — BISACODYL 10 MG: 10 SUPPOSITORY RECTAL at 16:44

## 2022-07-03 RX ADMIN — BUPROPION HYDROCHLORIDE 150 MG: 150 TABLET, EXTENDED RELEASE ORAL at 08:50

## 2022-07-03 RX ADMIN — CYCLOBENZAPRINE 10 MG: 10 TABLET, FILM COATED ORAL at 08:50

## 2022-07-03 RX ADMIN — DICYCLOMINE HYDROCHLORIDE 20 MG: 10 CAPSULE ORAL at 21:11

## 2022-07-03 RX ADMIN — ONDANSETRON 4 MG: 2 INJECTION INTRAMUSCULAR; INTRAVENOUS at 18:18

## 2022-07-03 RX ADMIN — SPIRONOLACTONE 50 MG: 50 TABLET ORAL at 08:50

## 2022-07-03 RX ADMIN — ALBUTEROL SULFATE 2.5 MG: 2.5 SOLUTION RESPIRATORY (INHALATION) at 07:28

## 2022-07-03 RX ADMIN — POLYETHYLENE GLYCOL 3350 17 G: 17 POWDER, FOR SOLUTION ORAL at 08:50

## 2022-07-03 RX ADMIN — PRAZOSIN HYDROCHLORIDE 6 MG: 1 CAPSULE ORAL at 21:09

## 2022-07-03 RX ADMIN — TRAZODONE HYDROCHLORIDE 100 MG: 100 TABLET ORAL at 21:11

## 2022-07-03 RX ADMIN — DICYCLOMINE HYDROCHLORIDE 20 MG: 10 CAPSULE ORAL at 14:03

## 2022-07-03 RX ADMIN — DICYCLOMINE HYDROCHLORIDE 20 MG: 10 CAPSULE ORAL at 08:50

## 2022-07-03 RX ADMIN — SENNOSIDES AND DOCUSATE SODIUM 1 TABLET: 50; 8.6 TABLET ORAL at 08:50

## 2022-07-03 RX ADMIN — SENNOSIDES AND DOCUSATE SODIUM 1 TABLET: 50; 8.6 TABLET ORAL at 21:11

## 2022-07-03 RX ADMIN — CYANOCOBALAMIN 1000 MCG: 1000 INJECTION INTRAMUSCULAR; SUBCUTANEOUS at 23:58

## 2022-07-03 RX ADMIN — LANSOPRAZOLE 30 MG: 30 CAPSULE, DELAYED RELEASE ORAL at 08:50

## 2022-07-03 RX ADMIN — GABAPENTIN 300 MG: 300 CAPSULE ORAL at 14:03

## 2022-07-03 RX ADMIN — SODIUM CHLORIDE, POTASSIUM CHLORIDE, SODIUM LACTATE AND CALCIUM CHLORIDE 125 ML/HR: 600; 310; 30; 20 INJECTION, SOLUTION INTRAVENOUS at 23:57

## 2022-07-03 RX ADMIN — ATORVASTATIN CALCIUM 10 MG: 10 TABLET, FILM COATED ORAL at 21:11

## 2022-07-03 RX ADMIN — ATENOLOL 50 MG: 50 TABLET ORAL at 08:50

## 2022-07-03 RX ADMIN — ENOXAPARIN SODIUM 40 MG: 100 INJECTION SUBCUTANEOUS at 14:03

## 2022-07-03 RX ADMIN — IBUPROFEN 600 MG: 600 TABLET ORAL at 08:50

## 2022-07-03 RX ADMIN — LIDOCAINE 2 PATCH: 50 PATCH TOPICAL at 08:50

## 2022-07-03 RX ADMIN — VENLAFAXINE HYDROCHLORIDE 225 MG: 75 CAPSULE, EXTENDED RELEASE ORAL at 08:50

## 2022-07-03 RX ADMIN — GABAPENTIN 300 MG: 300 CAPSULE ORAL at 08:50

## 2022-07-03 RX ADMIN — FLUTICASONE PROPIONATE 2 SPRAY: 50 SPRAY, METERED NASAL at 08:50

## 2022-07-03 RX ADMIN — IBUPROFEN 600 MG: 600 TABLET ORAL at 14:03

## 2022-07-03 RX ADMIN — ACETAMINOPHEN 650 MG: 325 TABLET ORAL at 17:20
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
## 2022-07-03 NOTE — NURSING END OF SHIFT
Nursing End of Shift Summary:    Patient: Ralph Giron  MRN: [id number]  : 1950, Age: 72 y.o.    Location: [address] [place] [address]    Nursing Goals  Clinical Goals for the Shift: pain manaagement, safety and comfort, vital signs    Narrative Summary of Progress Toward Clinical Goals:  Pt alert, oriented x4. Pain well controlled with oral medication. RA, CPAP at night. Encourage to cough and deep breath, bracing with pillow. Urine output increased with Lasix. Safety and comfort maintained.     Barriers to Goals/Nursing Concerns:  No    New Patient or Family Concerns/Issues:  No    Shift Summary:   Significant Events & Communications to Providers (last 12 hours)     Last 5 Values    No documentation.             Oxygen Usage (last 12 hours)     Last 5 Values     Row Name 22                   Oxygen Weaning Trial by Nursing    Is Patient on Room Air OR on the Same Amount of O2 as at Home? No        Are You Performing the QShift O2 Weaning Trial? No  pt on cpap                Mobility (last 12 hours)     Last 5 Values     Row Name 22 2350                Mobility    Patient Position Supine Supine       Turning Turns self --       Anti-Embolism Devices Applied Bilateral;AE calf pump --                 Urethral Catheter     Active Urethral Catheter     None            Active Lines     Active Central venous catheter / Peripherally inserted central catheter / Implantable Port / Hemodialysis catheter / Midline Catheter     None              Infusing Medications   Medication Dose Last Rate     PRN Medications   Medication Dose Last Admin   • albuterol  2.5 mg 2.5 mg at 22 1422   • hydrALAZINE  10 mg 10 mg at 22 1318   • fentaNYL citrate (PF)  50 mcg 50 mcg at 22 1916   • sodium chloride  3 mL     • acetaminophen  325-650 mg 650 mg at 22 0901   • oxyCODONE  5-10 mg 5 mg at 22 1405   • HYDROmorphone  0.4-0.6 mg 0.5 mg at 22 1015   • ondansetron  4 mg      Or   •  ondansetron  4 mg 4 mg at 06/29/22 1014   • alum-mag hydroxide-simeth  30 mL       _________________________  Carina Persaud RN  07/03/22 5:16 AM

## 2022-07-03 NOTE — INTERDISCIPLINARY/THERAPY
Case Management Progress Note    Phone # [phone]    Reason for Admission: Attending service anticipating that patient would be ready for discharge today.  Patient reporting increased resp symptoms including SOB and increased secretions. Encourage pulmonary toilet and ambulation.  Anticipate discharge once resp status stable for discharge.     Disposition: HO

## 2022-07-03 NOTE — PLAN OF CARE
Problem: Neurosensory - Adult  Goal: Achieves stable or improved neurological status  Description: INTERVENTIONS  1. Assess for and report changes in neurological status  2. Initiate measures to prevent increased intracranial pressure  3. Maintain blood pressure and fluid volume within ordered parameters to optimize cerebral perfusion and minimize risk of hemorrhage  4. Monitor temperature, glucose, and sodium. Initiate appropriate interventions as ordered  Outcome: Progressing  Goal: Achieves maximal functionality and self care  Description: INTERVENTIONS  1. Monitor swallowing and airway patency with patient fatigue and changes in neurological status  2. Encourage and assist patient to increase activity and self care with guidance from PT/OT  3. Encourage visually impaired, hearing impaired and aphasic patients to use assistive/communication devices  Outcome: Progressing     Problem: Musculoskeletal - Adult  Goal: Return mobility to safest level of function  Description: INTERVENTIONS:  1. Assess patient stability and activity tolerance for standing, transferring and ambulating w/ or w/o assistive devices  2. Assist with transfers and ambulation using safe practices  3. Ensure adequate protection for wounds/incisions during mobilization  4. Obtain PT/OT and other consults as needed  5. Apply Continuous Passive Motion per order to increase flexion toward goal  6. Instruct patient/family in ordered activity level  Outcome: Progressing  Goal: Maintain proper alignment of affected body part  Description: INTERVENTIONS:  1. Support and protect limb and body alignment per provider order  2. Instruct and reinforce with patient and family use of appropriate assistive device and precautions (e.g. spinal or hip dislocation precautions)  Outcome: Progressing  Goal: Return ADL status to a safe level of function  Description: INTERVENTIONS:  1. Assess patient's ADL deficits and provide assistive devices as needed  2. Obtain  PT/OT consults as needed  3. Assist and instruct patient to increase activity and self care  Outcome: Progressing     Problem: Safety Adult - Fall  Goal: Free from fall injury  Description: INTERVENTIONS:    Inpatient - Please reference Cares/Safety Flowsheet under [facility] Fall Risk for interventions.  Pediatrics - Please reference Peds Daily Cares/Safety Flowsheet under [facility] Pediatric Fall Assessment Fall Bundle for interventions  LD/OB - Please reference OB Shift Screening Flowsheet under OB Fall Risk for interventions.  Outcome: Progressing     Problem: Potential for Compromised Skin Integrity  Goal: Skin Integrity is Maintained or Improved  Description: INTERVENTIONS:  1. Assess and monitor skin integrity  2. Collaborate with interdisciplinary team and initiate plans and interventions as needed  3. Alternate a full bath with partial baths for elderly   4. Monitor patient's hygiene practices   5. Collaborate with wound, ostomy, and continence nurse  Outcome: Progressing  Goal: MOBILITY IS MAINTAINED OR IMPROVED  Description: INTERVENTIONS  1. Collaborate with interdisciplinary team and initiate plan and interventions as ordered (PT/OT)  2. Encourage ambulation  3. Up to chair for meals  4. Monitor for signs of deconditioning  Outcome: Progressing     Problem: Urinary Incontinence  Goal: Perineal skin integrity is maintained or improved  Description: INTERVENTIONS:  1. Assess genitourinary system, perineal skin, labs (urinalysis), and history of incontinence to include past management, aggravating, and alleviating factors  2. Collaborate with interdisciplinary team including wound, ostomy, and continence nurse and initiate plans and interventions as needed  4. Consider urine containment device  5. Apply skin protectant   6. Develop skin care regimen  7. Provide privacy when changing patient's incontinence device to maintain their dignity  Outcome: Progressing     Problem: Dressing Upper Extremities  Goal: LTG -  Patient will utilize adaptive techniques/equipment to dress upper body  Description: W/ min A  Outcome: Progressing
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
## 2022-07-04 LAB
ANION GAP SERPL CALC-SCNC: 9 MMOL/L (ref 3–11)
B PARAP IS1001 DNA NPH QL NAA+NON-PROBE: NEGATIVE
B PERT.PT PRMT NPH QL NAA+NON-PROBE: NEGATIVE
BUN SERPL-MCNC: 32 MG/DL (ref 7–25)
C PNEUM DNA NPH QL NAA+NON-PROBE: NEGATIVE
CALCIUM SERPL-MCNC: 8.8 MG/DL (ref 8.6–10.3)
CHLORIDE SERPL-SCNC: 106 MMOL/L (ref 98–107)
CHLORIDE UR-SCNC: 51 MMOL/L
CO2 SERPL-SCNC: 25 MMOL/L (ref 21–32)
CREAT SERPL-MCNC: 1.73 MG/DL (ref 0.7–1.3)
CREAT UR-MCNC: 173.8 MG/DL
ERYTHROCYTE [DISTWIDTH] IN BLOOD BY AUTOMATED COUNT: 15.6 % (ref 11.5–15)
FLUAV RNA NPH QL NAA+NON-PROBE: NEGATIVE
FLUBV RNA NPH QL NAA+NON-PROBE: NEGATIVE
GFR SERPL CREATININE-BSD FRML MDRD: 41 ML/MIN/1.73M*2
GLUCOSE SERPL-MCNC: 126 MG/DL (ref 70–105)
HADV DNA NPH QL NAA+NON-PROBE: NEGATIVE
HCOV 229E RNA NPH QL NAA+NON-PROBE: NEGATIVE
HCOV HKU1 RNA NPH QL NAA+NON-PROBE: NEGATIVE
HCOV NL63 RNA NPH QL NAA+NON-PROBE: NEGATIVE
HCOV OC43 RNA NPH QL NAA+NON-PROBE: NEGATIVE
HCT VFR BLD AUTO: 32.9 % (ref 38–50)
HGB BLD-MCNC: 10.9 G/DL (ref 13.2–17.2)
HMPV RNA NPH QL NAA+NON-PROBE: NEGATIVE
HPIV1 RNA NPH QL NAA+NON-PROBE: NEGATIVE
HPIV2 RNA NPH QL NAA+NON-PROBE: NEGATIVE
HPIV3 RNA NPH QL NAA+NON-PROBE: NEGATIVE
HPIV4 RNA NPH QL NAA+NON-PROBE: NEGATIVE
M PNEUMO DNA NPH QL NAA+NON-PROBE: NEGATIVE
MCH RBC QN AUTO: 27.9 PG (ref 29–34)
MCHC RBC AUTO-ENTMCNC: 33.2 G/DL (ref 32–36)
MCV RBC AUTO: 84 FL (ref 82–97)
OSMOLALITY UR: 600 MOSM/KG (ref 250–900)
PLATELET # BLD AUTO: 159 10*3/UL (ref 130–350)
PMV BLD AUTO: 7 FL (ref 6.9–10.8)
POTASSIUM SERPL-SCNC: 4.3 MMOL/L (ref 3.5–5.1)
POTASSIUM UR-SCNC: 44 MMOL/L
RBC # BLD AUTO: 3.92 10*6/ΜL (ref 4.1–5.8)
RSV RNA NPH QL NAA+NON-PROBE: NEGATIVE
RV+EV RNA NPH QL NAA+NON-PROBE: NEGATIVE
SARS-COV-2 RNA NPH QL NAA+NON-PROBE: NEGATIVE
SODIUM SERPL-SCNC: 140 MMOL/L (ref 135–145)
SODIUM UR-SCNC: 42 MMOL/L
WBC # BLD AUTO: 7.9 10*3/UL (ref 3.7–9.6)

## 2022-07-04 PROCEDURE — 97116 GAIT TRAINING THERAPY: CPT | Mod: GP | Performed by: PHYSICAL THERAPIST

## 2022-07-04 PROCEDURE — 51798 US URINE CAPACITY MEASURE: CPT

## 2022-07-04 PROCEDURE — 36415 COLL VENOUS BLD VENIPUNCTURE: CPT | Performed by: NURSE PRACTITIONER

## 2022-07-04 PROCEDURE — 6370000100 HC RX 637 (ALT 250 FOR IP): Performed by: NURSE PRACTITIONER

## 2022-07-04 PROCEDURE — 6360000200 HC RX 636 W HCPCS (ALT 250 FOR IP): Performed by: NURSE PRACTITIONER

## 2022-07-04 PROCEDURE — 87635 SARS-COV-2 COVID-19 AMP PRB: CPT | Performed by: INTERNAL MEDICINE

## 2022-07-04 PROCEDURE — (BLANK) HC ROOM PRIVATE

## 2022-07-04 PROCEDURE — 80048 BASIC METABOLIC PNL TOTAL CA: CPT | Performed by: INTERNAL MEDICINE

## 2022-07-04 PROCEDURE — 94660 CPAP INITIATION&MGMT: CPT

## 2022-07-04 PROCEDURE — 82436 ASSAY OF URINE CHLORIDE: CPT | Performed by: INTERNAL MEDICINE

## 2022-07-04 PROCEDURE — 6370000100 HC RX 637 (ALT 250 FOR IP): Performed by: INTERNAL MEDICINE

## 2022-07-04 PROCEDURE — 83935 ASSAY OF URINE OSMOLALITY: CPT | Performed by: INTERNAL MEDICINE

## 2022-07-04 PROCEDURE — 99232 SBSQ HOSP IP/OBS MODERATE 35: CPT | Performed by: INTERNAL MEDICINE

## 2022-07-04 PROCEDURE — 2590000100 HC RX 259: Performed by: NURSE PRACTITIONER

## 2022-07-04 PROCEDURE — 2500000200 HC RX 250 WO HCPCS: Performed by: INTERNAL MEDICINE

## 2022-07-04 PROCEDURE — 99232 SBSQ HOSP IP/OBS MODERATE 35: CPT | Performed by: NURSE PRACTITIONER

## 2022-07-04 PROCEDURE — 2580000300 HC RX 258: Performed by: INTERNAL MEDICINE

## 2022-07-04 PROCEDURE — 85027 COMPLETE CBC AUTOMATED: CPT | Performed by: NURSE PRACTITIONER

## 2022-07-04 PROCEDURE — 2590000100 HC RX 259: Performed by: INTERNAL MEDICINE

## 2022-07-04 RX ORDER — LISINOPRIL 40 MG/1
40 TABLET ORAL DAILY
COMMUNITY

## 2022-07-04 RX ORDER — SODIUM CHLORIDE 9 MG/ML
100 INJECTION, SOLUTION INTRAVENOUS CONTINUOUS
Status: DISCONTINUED | OUTPATIENT
Start: 2022-07-04 | End: 2022-07-05

## 2022-07-04 RX ORDER — CETIRIZINE HYDROCHLORIDE 10 MG/1
10 TABLET ORAL DAILY
COMMUNITY

## 2022-07-04 RX ADMIN — SENNOSIDES AND DOCUSATE SODIUM 1 TABLET: 50; 8.6 TABLET ORAL at 20:13

## 2022-07-04 RX ADMIN — SODIUM CHLORIDE 100 ML/HR: 9 INJECTION, SOLUTION INTRAVENOUS at 23:26

## 2022-07-04 RX ADMIN — FLUTICASONE PROPIONATE 2 SPRAY: 50 SPRAY, METERED NASAL at 09:00

## 2022-07-04 RX ADMIN — OXYCODONE HYDROCHLORIDE 10 MG: 5 TABLET ORAL at 05:41

## 2022-07-04 RX ADMIN — ENOXAPARIN SODIUM 40 MG: 100 INJECTION SUBCUTANEOUS at 14:28

## 2022-07-04 RX ADMIN — HYDROMORPHONE HYDROCHLORIDE 0.5 MG: 1 INJECTION, SOLUTION INTRAMUSCULAR; INTRAVENOUS; SUBCUTANEOUS at 07:02

## 2022-07-04 RX ADMIN — PSYLLIUM HUSK 1 PACKET: 3.4 POWDER ORAL at 08:29

## 2022-07-04 RX ADMIN — DICYCLOMINE HYDROCHLORIDE 20 MG: 10 CAPSULE ORAL at 20:13

## 2022-07-04 RX ADMIN — LANSOPRAZOLE 30 MG: 30 CAPSULE, DELAYED RELEASE ORAL at 08:29

## 2022-07-04 RX ADMIN — SODIUM CHLORIDE 100 ML/HR: 9 INJECTION, SOLUTION INTRAVENOUS at 08:37

## 2022-07-04 RX ADMIN — TRAZODONE HYDROCHLORIDE 100 MG: 100 TABLET ORAL at 20:12

## 2022-07-04 RX ADMIN — OXYCODONE HYDROCHLORIDE 10 MG: 5 TABLET ORAL at 14:28

## 2022-07-04 RX ADMIN — SENNOSIDES AND DOCUSATE SODIUM 1 TABLET: 50; 8.6 TABLET ORAL at 08:29

## 2022-07-04 RX ADMIN — ATENOLOL 50 MG: 50 TABLET ORAL at 08:28

## 2022-07-04 RX ADMIN — LIDOCAINE 2 PATCH: 50 PATCH TOPICAL at 08:29

## 2022-07-04 RX ADMIN — VENLAFAXINE HYDROCHLORIDE 225 MG: 75 CAPSULE, EXTENDED RELEASE ORAL at 08:29

## 2022-07-04 RX ADMIN — BUPROPION HYDROCHLORIDE 150 MG: 150 TABLET, EXTENDED RELEASE ORAL at 08:29

## 2022-07-04 RX ADMIN — OXYCODONE HYDROCHLORIDE 5 MG: 5 TABLET ORAL at 20:11

## 2022-07-04 RX ADMIN — PRAZOSIN HYDROCHLORIDE 6 MG: 1 CAPSULE ORAL at 20:11

## 2022-07-04 RX ADMIN — ATORVASTATIN CALCIUM 10 MG: 10 TABLET, FILM COATED ORAL at 20:12

## 2022-07-04 RX ADMIN — DICYCLOMINE HYDROCHLORIDE 20 MG: 10 CAPSULE ORAL at 08:29

## 2022-07-04 RX ADMIN — CYANOCOBALAMIN TAB 1000 MCG 1000 MCG: 1000 TAB at 08:29

## 2022-07-04 RX ADMIN — DICYCLOMINE HYDROCHLORIDE 20 MG: 10 CAPSULE ORAL at 14:28

## 2022-07-04 RX ADMIN — LACTULOSE 300 ML: 10 SOLUTION ORAL; RECTAL at 05:11
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
## 2022-07-04 NOTE — NURSING END OF SHIFT
Nursing End of Shift Summary:    Patient: Ralph Giron  MRN: [id number]  : 1950, Age: 72 y.o.    Location: [address] [place] [address]    Nursing Goals  Clinical Goals for the Shift: monitor vs/IO, pain mgmt, ambulation, assess resp status, pulm toilet: IS, cough and deep breath, turn, walk, monitor/titrate o2    Narrative Summary of Progress Toward Clinical Goals:    Safety and comfort maintained. Pain controlled. Neuro status remained unchanged. Bladder scan done and charted. Was able to poop x1 last night, and no BM this morning after receiving the laxative enema. No complaint of nausea during the shift     Barriers to Goals/Nursing Concerns:  Bowel function  Rehab    New Patient or Family Concerns/Issues:  No    Shift Summary:   Significant Events & Communications to Providers (last 12 hours)     Last 5 Values    No documentation.             Oxygen Usage (last 12 hours)     Last 5 Values     Row Name 22 0438                Oxygen Weaning Trial by Nursing    Is Patient on Room Air OR on the Same Amount of O2 as at Home? Yes Yes               Mobility (last 12 hours)     Last 5 Values     Row Name 22 2340                Mobility    Activity Bathroom privileges --       Level of Assistance Contact guard assist, steadying assist --       Length of Time in Chair (min) 60 --       Distance Ambulated (feet) 5 Feet --       Distance Ambulated (Meters Calculated) 1.52 Meters --       Patient Position Supine Supine       Turning Turns self --       Distance Ambulated (Meters Calculated)(Do Not Use) 1.52 Feet --       Anti-Embolism Devices Applied Bilateral;AE calf pump --                 Urethral Catheter     Active Urethral Catheter     None            Active Lines     Active Central venous catheter / Peripherally inserted central catheter / Implantable Port / Hemodialysis catheter / Midline Catheter     None              Infusing Medications   Medication Dose Last Rate   • LR  125 mL/hr 125  mL/hr (07/03/22 8922)     PRN Medications   Medication Dose Last Admin   • cyclobenzaprine  10 mg     • bisacodyL  10 mg 10 mg at 07/03/22 1644   • benzonatate  200 mg     • albuterol  2.5 mg 2.5 mg at 07/03/22 0728   • hydrALAZINE  10 mg 10 mg at 07/01/22 1318   • fentaNYL citrate (PF)  50 mcg 50 mcg at 07/01/22 1916   • sodium chloride  3 mL     • acetaminophen  325-650 mg 650 mg at 07/03/22 1720   • oxyCODONE  5-10 mg 5 mg at 07/02/22 1405   • HYDROmorphone  0.4-0.6 mg 0.5 mg at 06/29/22 1015   • ondansetron  4 mg      Or   • ondansetron  4 mg 4 mg at 07/03/22 1818   • alum-mag hydroxide-simeth  30 mL       _________________________  Mariana Wilcox RN  07/04/22 4:51 AM

## 2022-07-04 NOTE — PLAN OF CARE
Problem: Neurosensory - Adult  Goal: Achieves stable or improved neurological status  Description: INTERVENTIONS  1. Assess for and report changes in neurological status  2. Initiate measures to prevent increased intracranial pressure  3. Maintain blood pressure and fluid volume within ordered parameters to optimize cerebral perfusion and minimize risk of hemorrhage  4. Monitor temperature, glucose, and sodium. Initiate appropriate interventions as ordered  Outcome: Progressing  Goal: Achieves maximal functionality and self care  Description: INTERVENTIONS  1. Monitor swallowing and airway patency with patient fatigue and changes in neurological status  2. Encourage and assist patient to increase activity and self care with guidance from PT/OT  3. Encourage visually impaired, hearing impaired and aphasic patients to use assistive/communication devices  Outcome: Progressing     Problem: Musculoskeletal - Adult  Goal: Return mobility to safest level of function  Description: INTERVENTIONS:  1. Assess patient stability and activity tolerance for standing, transferring and ambulating w/ or w/o assistive devices  2. Assist with transfers and ambulation using safe practices  3. Ensure adequate protection for wounds/incisions during mobilization  4. Obtain PT/OT and other consults as needed  5. Apply Continuous Passive Motion per order to increase flexion toward goal  6. Instruct patient/family in ordered activity level  Outcome: Progressing  Goal: Maintain proper alignment of affected body part  Description: INTERVENTIONS:  1. Support and protect limb and body alignment per provider order  2. Instruct and reinforce with patient and family use of appropriate assistive device and precautions (e.g. spinal or hip dislocation precautions)  Outcome: Progressing  Goal: Return ADL status to a safe level of function  Description: INTERVENTIONS:  1. Assess patient's ADL deficits and provide assistive devices as needed  2. Obtain  PT/OT consults as needed  3. Assist and instruct patient to increase activity and self care  Outcome: Progressing     Problem: Safety Adult - Fall  Goal: Free from fall injury  Description: INTERVENTIONS:    Inpatient - Please reference Cares/Safety Flowsheet under [place] Fall Risk for interventions.  Pediatrics - Please reference Peds Daily Cares/Safety Flowsheet under [facility] Pediatric Fall Assessment Fall Bundle for interventions  LD/OB - Please reference OB Shift Screening Flowsheet under OB Fall Risk for interventions.  Outcome: Progressing     Problem: Knowledge Deficit  Goal: Patient/family/caregiver demonstrates understanding of disease process, treatment plan, medications, and discharge instructions  Description: INTERVENTIONS:   1. Complete learning assessment and assess knowledge base  2. Provide teaching at level of understanding   3. Provide teaching via preferred learning methods  Outcome: Progressing     Problem: Potential for Compromised Skin Integrity  Goal: Skin Integrity is Maintained or Improved  Description: INTERVENTIONS:  1. Assess and monitor skin integrity  2. Collaborate with interdisciplinary team and initiate plans and interventions as needed  3. Alternate a full bath with partial baths for elderly   4. Monitor patient's hygiene practices   5. Collaborate with wound, ostomy, and continence nurse  Outcome: Progressing  Goal: Nutritional status is improving  Description: INTERVENTIONS:  1. Monitor and assess patient for malnutrition (ex- brittle hair, bruises, dry skin, pale skin and conjunctiva, muscle wasting, smooth red tongue, and disorientation)  2. Monitor patient's weight and dietary intake as ordered or per policy  3. Determine patient's food preferences and provide high-protein, high-caloric foods as appropriate  4. Assist patient with eating   5. Allow adequate time for meals   6. Encourage patient to take dietary supplement as ordered   7. Collaborate with dietitian  8. Include  patient/family/caregiver in decisions related to nutrition  Outcome: Progressing  Goal: MOBILITY IS MAINTAINED OR IMPROVED  Description: INTERVENTIONS  1. Collaborate with interdisciplinary team and initiate plan and interventions as ordered (PT/OT)  2. Encourage ambulation  3. Up to chair for meals  4. Monitor for signs of deconditioning  Outcome: Progressing     Problem: Urinary Incontinence  Goal: Perineal skin integrity is maintained or improved  Description: INTERVENTIONS:  1. Assess genitourinary system, perineal skin, labs (urinalysis), and history of incontinence to include past management, aggravating, and alleviating factors  2. Collaborate with interdisciplinary team including wound, ostomy, and continence nurse and initiate plans and interventions as needed  4. Consider urine containment device  5. Apply skin protectant   6. Develop skin care regimen  7. Provide privacy when changing patient's incontinence device to maintain their dignity  Outcome: Progressing     Problem: Dressing Upper Extremities  Goal: LTG - Patient will utilize adaptive techniques/equipment to dress upper body  Description: W/ min A  Outcome: Progressing

## 2022-07-04 NOTE — PLAN OF CARE
Problem: Neurosensory - Adult  Goal: Achieves stable or improved neurological status  Description: INTERVENTIONS  1. Assess for and report changes in neurological status  2. Initiate measures to prevent increased intracranial pressure  3. Maintain blood pressure and fluid volume within ordered parameters to optimize cerebral perfusion and minimize risk of hemorrhage  4. Monitor temperature, glucose, and sodium. Initiate appropriate interventions as ordered  Outcome: Progressing  Goal: Achieves maximal functionality and self care  Description: INTERVENTIONS  1. Monitor swallowing and airway patency with patient fatigue and changes in neurological status  2. Encourage and assist patient to increase activity and self care with guidance from PT/OT  3. Encourage visually impaired, hearing impaired and aphasic patients to use assistive/communication devices  Outcome: Progressing     Problem: Musculoskeletal - Adult  Goal: Return mobility to safest level of function  Description: INTERVENTIONS:  1. Assess patient stability and activity tolerance for standing, transferring and ambulating w/ or w/o assistive devices  2. Assist with transfers and ambulation using safe practices  3. Ensure adequate protection for wounds/incisions during mobilization  4. Obtain PT/OT and other consults as needed  5. Apply Continuous Passive Motion per order to increase flexion toward goal  6. Instruct patient/family in ordered activity level  Outcome: Progressing  Goal: Maintain proper alignment of affected body part  Description: INTERVENTIONS:  1. Support and protect limb and body alignment per provider order  2. Instruct and reinforce with patient and family use of appropriate assistive device and precautions (e.g. spinal or hip dislocation precautions)  Outcome: Progressing  Goal: Return ADL status to a safe level of function  Description: INTERVENTIONS:  1. Assess patient's ADL deficits and provide assistive devices as needed  2. Obtain  PT/OT consults as needed  3. Assist and instruct patient to increase activity and self care  Outcome: Progressing     Problem: Safety Adult - Fall  Goal: Free from fall injury  Description: INTERVENTIONS:    Inpatient - Please reference Cares/Safety Flowsheet under [place] Fall Risk for interventions.  Pediatrics - Please reference Peds Daily Cares/Safety Flowsheet under [facility] Pediatric Fall Assessment Fall Bundle for interventions  LD/OB - Please reference OB Shift Screening Flowsheet under OB Fall Risk for interventions.  Outcome: Progressing     Problem: Knowledge Deficit  Goal: Patient/family/caregiver demonstrates understanding of disease process, treatment plan, medications, and discharge instructions  Description: INTERVENTIONS:   1. Complete learning assessment and assess knowledge base  2. Provide teaching at level of understanding   3. Provide teaching via preferred learning methods  Outcome: Progressing     Problem: Potential for Compromised Skin Integrity  Goal: Skin Integrity is Maintained or Improved  Description: INTERVENTIONS:  1. Assess and monitor skin integrity  2. Collaborate with interdisciplinary team and initiate plans and interventions as needed  3. Alternate a full bath with partial baths for elderly   4. Monitor patient's hygiene practices   5. Collaborate with wound, ostomy, and continence nurse  Outcome: Progressing  Goal: Nutritional status is improving  Description: INTERVENTIONS:  1. Monitor and assess patient for malnutrition (ex- brittle hair, bruises, dry skin, pale skin and conjunctiva, muscle wasting, smooth red tongue, and disorientation)  2. Monitor patient's weight and dietary intake as ordered or per policy  3. Determine patient's food preferences and provide high-protein, high-caloric foods as appropriate  4. Assist patient with eating   5. Allow adequate time for meals   6. Encourage patient to take dietary supplement as ordered   7. Collaborate with dietitian  8. Include  patient/family/caregiver in decisions related to nutrition  Outcome: Progressing  Goal: MOBILITY IS MAINTAINED OR IMPROVED  Description: INTERVENTIONS  1. Collaborate with interdisciplinary team and initiate plan and interventions as ordered (PT/OT)  2. Encourage ambulation  3. Up to chair for meals  4. Monitor for signs of deconditioning  Outcome: Progressing     Problem: Urinary Incontinence  Goal: Perineal skin integrity is maintained or improved  Description: INTERVENTIONS:  1. Assess genitourinary system, perineal skin, labs (urinalysis), and history of incontinence to include past management, aggravating, and alleviating factors  2. Collaborate with interdisciplinary team including wound, ostomy, and continence nurse and initiate plans and interventions as needed  4. Consider urine containment device  5. Apply skin protectant   6. Develop skin care regimen  7. Provide privacy when changing patient's incontinence device to maintain their dignity  Outcome: Progressing     Problem: Dressing Upper Extremities  Goal: LTG - Patient will utilize adaptive techniques/equipment to dress upper body  Description: W/ min A  Outcome: Progressing     Problem: Mobility  Goal: STG - Patient will ambulate  Outcome: Progressing     Problem: TRANSFERS  Goal: STG - Patient will transfer to and from sit to stand  Outcome: Progressing  Goal: STG - Patient will perform bed mobility  Outcome: Progressing     Problem: Infection Control  Goal: MINIMIZE THE ACQUISITION AND TRANSMISSION OF INFECTIOUS AGENTS  Description: INTERVENTIONS:  1. Isolate patient with suspected/diagnosed communicable disease  2. Place on designated isolation precautions  3. Maintain isolation techniques  4. Perform hand hygiene before and after each patient care activity  5. [place] universal precautions  6. Wear PPE as directed for type of isolation  7. Administer antibiotic therapy as ordered  8. Clean the environment appropriately after each patient use  9.  Clean patient care equipment after each patient use as it leaves the room  10. Limit number of visitors, as appropriate  Outcome: Progressing
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
## 2022-07-04 NOTE — NURSING END OF SHIFT
Nursing End of Shift Summary:    Patient: Ralph Giron  MRN: [id number]  : 1950, Age: 72 y.o.    Location: [address] [place] [address]    Nursing Goals  Clinical Goals for the Shift: monitor vs/IO, pain mgmt, ambulation, assess resp status, pulm toilet: IS, cough and deep breath, turn, walk, monitor/titrate o2    Narrative Summary of Progress Toward Clinical Goals:  Patient aox3/ delerious at times, reorients. Pt showered, ambulated in room and anne, remain in chair for several hours, remains on 2 lpm. Voided 1 unmeasure in toilet and once 650 ml. Had nausea in evening/emesis x 1 unmeasured, given zofran, renal function worsened, hosp consult    Barriers to Goals/Nursing Concerns:  Renal function/ pulm toilet/ rehab placement    New Patient or Family Concerns/Issues:  Wife concerned for patient mental status/confusion/worsening renal function    Shift Summary:   Significant Events & Communications to Providers (last 12 hours)     Last 5 Values    No documentation.             Oxygen Usage (last 12 hours)     Last 5 Values     Oxygen Weaning Trial by Nursing     Row Name 22 0742 22 0746    Is Patient on Room Air OR on the Same Amount of O2 as at Home? Yes No    Are You Performing the QShift O2 Weaning Trial? Yes Yes    O2 Setting at Start of Trial Room Air no documentation    SpO2 During Trial 89 % no documentation    O2 Flow Rate L/min After Trial 0 lpm no documentation    SpO2 After Trial 90 % no documentation                  Mobility (last 12 hours)     Last 5 Values     Mobility     Row Name 22 0742 22 0800 22 1200 22 1600    Activity no documentation no documentation Ambulate in room;Bathroom privileges no documentation    Level of Assistance no documentation no documentation Contact guard assist, steadying assist no documentation    Distance Ambulated (feet) no documentation no documentation 25 Feet no documentation    Distance Ambulated (Meters Calculated) no documentation no  documentation 7.62 Meters no documentation    Patient Position no documentation Supine no documentation Supine    Turning no documentation Turns self Turns self no documentation    Distance Ambulated (Meters Calculated)(Do Not Use) no documentation no documentation 7.62 Feet no documentation    Anti-Embolism Devices Applied Bilateral;AE calf pump no documentation no documentation no documentation                    Urethral Catheter     Active Urethral Catheter     None            Active Lines     Active Central venous catheter / Peripherally inserted central catheter / Implantable Port / Hemodialysis catheter / Midline Catheter     None              Infusing Medications   Medication Dose Last Rate     PRN Medications   Medication Dose Last Admin   • cyclobenzaprine  10 mg     • bisacodyL  10 mg 10 mg at 07/03/22 1644   • albuterol  2.5 mg 2.5 mg at 07/03/22 0728   • hydrALAZINE  10 mg 10 mg at 07/01/22 1318   • fentaNYL citrate (PF)  50 mcg 50 mcg at 07/01/22 1916   • sodium chloride  3 mL     • acetaminophen  325-650 mg 650 mg at 07/03/22 1720   • oxyCODONE  5-10 mg 5 mg at 07/02/22 1405   • HYDROmorphone  0.4-0.6 mg 0.5 mg at 06/29/22 1015   • ondansetron  4 mg      Or   • ondansetron  4 mg 4 mg at 07/03/22 1818   • alum-mag hydroxide-simeth  30 mL       _________________________  JOSE MINOR RN  07/03/22 7:11 PM

## 2022-07-05 LAB
ALBUMIN SERPL-MCNC: 3.2 G/DL (ref 3.5–5.3)
ANION GAP SERPL CALC-SCNC: 6 MMOL/L (ref 3–11)
BUN SERPL-MCNC: 23 MG/DL (ref 7–25)
CALCIUM ALBUM COR SERPL-MCNC: 9.1 MG/DL (ref 8.6–10.3)
CALCIUM SERPL-MCNC: 8 MG/DL (ref 8.6–10.3)
CALCIUM SERPL-MCNC: 8.5 MG/DL (ref 8.6–10.3)
CHLORIDE SERPL-SCNC: 106 MMOL/L (ref 98–107)
CO2 SERPL-SCNC: 26 MMOL/L (ref 21–32)
CREAT SERPL-MCNC: 0.97 MG/DL (ref 0.7–1.3)
ERYTHROCYTE [DISTWIDTH] IN BLOOD BY AUTOMATED COUNT: 15.4 % (ref 11.5–15)
GFR SERPL CREATININE-BSD FRML MDRD: 82 ML/MIN/1.73M*2
GLUCOSE SERPL-MCNC: 103 MG/DL (ref 70–105)
HCT VFR BLD AUTO: 29.1 % (ref 38–50)
HGB BLD-MCNC: 9.8 G/DL (ref 13.2–17.2)
MCH RBC QN AUTO: 28 PG (ref 29–34)
MCHC RBC AUTO-ENTMCNC: 33.8 G/DL (ref 32–36)
MCV RBC AUTO: 82.8 FL (ref 82–97)
PLATELET # BLD AUTO: 166 10*3/UL (ref 130–350)
PMV BLD AUTO: 6.8 FL (ref 6.9–10.8)
POTASSIUM SERPL-SCNC: 4.1 MMOL/L (ref 3.5–5.1)
RBC # BLD AUTO: 3.51 10*6/ΜL (ref 4.1–5.8)
SODIUM SERPL-SCNC: 138 MMOL/L (ref 135–145)
WBC # BLD AUTO: 6.9 10*3/UL (ref 3.7–9.6)

## 2022-07-05 PROCEDURE — 99232 SBSQ HOSP IP/OBS MODERATE 35: CPT | Performed by: NURSE PRACTITIONER

## 2022-07-05 PROCEDURE — 99231 SBSQ HOSP IP/OBS SF/LOW 25: CPT | Performed by: INTERNAL MEDICINE

## 2022-07-05 PROCEDURE — 80048 BASIC METABOLIC PNL TOTAL CA: CPT | Performed by: INTERNAL MEDICINE

## 2022-07-05 PROCEDURE — 51798 US URINE CAPACITY MEASURE: CPT

## 2022-07-05 PROCEDURE — 82310 ASSAY OF CALCIUM: CPT | Performed by: NURSE PRACTITIONER

## 2022-07-05 PROCEDURE — 2590000100 HC RX 259: Performed by: NURSE PRACTITIONER

## 2022-07-05 PROCEDURE — 6370000100 HC RX 637 (ALT 250 FOR IP): Performed by: INTERNAL MEDICINE

## 2022-07-05 PROCEDURE — 2590000100 HC RX 259: Performed by: INTERNAL MEDICINE

## 2022-07-05 PROCEDURE — 6360000200 HC RX 636 W HCPCS (ALT 250 FOR IP): Performed by: NURSE PRACTITIONER

## 2022-07-05 PROCEDURE — 85027 COMPLETE CBC AUTOMATED: CPT | Performed by: NURSE PRACTITIONER

## 2022-07-05 PROCEDURE — 97116 GAIT TRAINING THERAPY: CPT | Mod: GP | Performed by: PHYSICAL THERAPIST

## 2022-07-05 PROCEDURE — 36415 COLL VENOUS BLD VENIPUNCTURE: CPT | Performed by: NURSE PRACTITIONER

## 2022-07-05 PROCEDURE — 94660 CPAP INITIATION&MGMT: CPT

## 2022-07-05 PROCEDURE — 97110 THERAPEUTIC EXERCISES: CPT | Mod: GO,CO

## 2022-07-05 PROCEDURE — 97530 THERAPEUTIC ACTIVITIES: CPT | Mod: GP | Performed by: PHYSICAL THERAPIST

## 2022-07-05 PROCEDURE — (BLANK) HC ROOM PRIVATE

## 2022-07-05 PROCEDURE — 6370000100 HC RX 637 (ALT 250 FOR IP): Performed by: NURSE PRACTITIONER

## 2022-07-05 PROCEDURE — 97129 THER IVNTJ 1ST 15 MIN: CPT | Mod: GO,CO

## 2022-07-05 PROCEDURE — 97530 THERAPEUTIC ACTIVITIES: CPT | Mod: GO,CO

## 2022-07-05 RX ADMIN — ACETAMINOPHEN 650 MG: 325 TABLET ORAL at 20:25

## 2022-07-05 RX ADMIN — LIDOCAINE 2 PATCH: 50 PATCH TOPICAL at 08:41

## 2022-07-05 RX ADMIN — TRAZODONE HYDROCHLORIDE 100 MG: 100 TABLET ORAL at 20:21

## 2022-07-05 RX ADMIN — LANSOPRAZOLE 30 MG: 30 CAPSULE, DELAYED RELEASE ORAL at 08:40

## 2022-07-05 RX ADMIN — DICYCLOMINE HYDROCHLORIDE 20 MG: 10 CAPSULE ORAL at 15:38

## 2022-07-05 RX ADMIN — SENNOSIDES AND DOCUSATE SODIUM 1 TABLET: 50; 8.6 TABLET ORAL at 08:40

## 2022-07-05 RX ADMIN — PRAZOSIN HYDROCHLORIDE 6 MG: 1 CAPSULE ORAL at 20:21

## 2022-07-05 RX ADMIN — OXYCODONE HYDROCHLORIDE 10 MG: 5 TABLET ORAL at 06:38

## 2022-07-05 RX ADMIN — DICYCLOMINE HYDROCHLORIDE 20 MG: 10 CAPSULE ORAL at 20:21

## 2022-07-05 RX ADMIN — BUPROPION HYDROCHLORIDE 150 MG: 150 TABLET, EXTENDED RELEASE ORAL at 08:40

## 2022-07-05 RX ADMIN — VENLAFAXINE HYDROCHLORIDE 225 MG: 75 CAPSULE, EXTENDED RELEASE ORAL at 08:40

## 2022-07-05 RX ADMIN — ACETAMINOPHEN 650 MG: 325 TABLET ORAL at 15:38

## 2022-07-05 RX ADMIN — ATENOLOL 50 MG: 50 TABLET ORAL at 08:41

## 2022-07-05 RX ADMIN — ATORVASTATIN CALCIUM 10 MG: 10 TABLET, FILM COATED ORAL at 20:21

## 2022-07-05 RX ADMIN — PSYLLIUM HUSK 1 PACKET: 3.4 POWDER ORAL at 08:41

## 2022-07-05 RX ADMIN — ALUMINA, MAGNESIA, AND SIMETHICONE ORAL SUSPENSION REGULAR STRENGTH 30 ML: 1200; 1200; 120 SUSPENSION ORAL at 20:20

## 2022-07-05 RX ADMIN — SENNOSIDES AND DOCUSATE SODIUM 1 TABLET: 50; 8.6 TABLET ORAL at 20:21

## 2022-07-05 RX ADMIN — DICYCLOMINE HYDROCHLORIDE 20 MG: 10 CAPSULE ORAL at 08:40

## 2022-07-05 RX ADMIN — ENOXAPARIN SODIUM 40 MG: 100 INJECTION SUBCUTANEOUS at 15:38

## 2022-07-05 RX ADMIN — CYANOCOBALAMIN TAB 1000 MCG 1000 MCG: 1000 TAB at 08:41

## 2022-07-05 NOTE — NURSING END OF SHIFT
Nursing End of Shift Summary:    Patient: Ralph Giron  MRN: [id number]  : 1950, Age: 72 y.o.    Location: [address] [place] [address]    Nursing Goals  Clinical Goals for the Shift: Pain managment and ambulation    Narrative Summary of Progress Toward Clinical Goals:  Safety and comfort maintained. Pain controlled with oxycodone 5mg. Slept well. Walked round the POD twicw and went to the bathroom to pee several times last night       Barriers to Goals/Nursing Concerns:  No  New Patient or Family Concerns/Issues:  No    Shift Summary:   Significant Events & Communications to Providers (last 12 hours)     Last 5 Values    No documentation.             Oxygen Usage (last 12 hours)     Last 5 Values     Row Name 22                   Oxygen Weaning Trial by Nursing    Is Patient on Room Air OR on the Same Amount of O2 as at Home? Yes        Are You Performing the QShift O2 Weaning Trial? Yes        O2 Setting at Start of Trial Room Air        SpO2 During Trial 87 %        O2 Flow Rate L/min After Trial 2 lpm        SpO2 After Trial 93 %                Mobility (last 12 hours)     Last 5 Values     Row Name 22 0034             Mobility    Activity -- Ambulate in anne;Ambulate in room;Bathroom privileges --      Level of Assistance -- Standby assist, set-up cues, supervision of patient - no hands on --      Distance Ambulated (feet) -- 100 Feet --      Distance Ambulated (Meters Calculated) -- 30.48 Meters --      Patient Position Supine Supine Supine      Turning Turns self Turns self Turns self      Distance Ambulated (Meters Calculated)(Do Not Use) -- 30.48 Feet --      Anti-Embolism Devices Applied -- Bilateral;AE calf pump --                Urethral Catheter     Active Urethral Catheter     None            Active Lines     Active Central venous catheter / Peripherally inserted central catheter / Implantable Port / Hemodialysis catheter / Midline Catheter     None               Infusing Medications   Medication Dose Last Rate   • sodium chloride 0.9 %  100 mL/hr 100 mL/hr (07/04/22 2326)     PRN Medications   Medication Dose Last Admin   • cyclobenzaprine  10 mg     • bisacodyL  10 mg 10 mg at 07/03/22 1644   • benzonatate  200 mg     • albuterol  2.5 mg 2.5 mg at 07/03/22 0728   • hydrALAZINE  10 mg 10 mg at 07/01/22 1318   • fentaNYL citrate (PF)  50 mcg 50 mcg at 07/01/22 1916   • sodium chloride  3 mL     • acetaminophen  325-650 mg 650 mg at 07/03/22 1720   • oxyCODONE  5-10 mg 5 mg at 07/04/22 2011   • HYDROmorphone  0.4-0.6 mg 0.5 mg at 07/04/22 0702   • ondansetron  4 mg      Or   • ondansetron  4 mg 4 mg at 07/03/22 1818   • alum-mag hydroxide-simeth  30 mL       _________________________  Mariana Wilcox RN  07/05/22 4:44 AM
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
## 2022-07-05 NOTE — INTERDISCIPLINARY/THERAPY
07/05/22 1321   Time Calculation   Start Time 1321   Stop Time 1345   Time Calculation (min) 24 min   OT Last Visit   OT Received On 07/05/22   General   Chart Reviewed Yes   Is this a Co-Treatment? Yes   Family/Caregiver Present Yes   Subjective Comments   RN Stated patient is medically cleared for therapy Yes   Subjective Comments Pt in reclinerat start and end of session with all needs met. Pt agreed to tx. Pt and wife stated pt with decreased memory and recall. They both voiced concern about inaccessable RV home envoironment.   Cognition   Arousal/Alertness WFL   Orientation Level Oriented to time;Oriented to situation;Oriented to person;Oriented to place   Cognition Comment Pt was given 5 words to remember and able to recall 4/5   Bed Mobility   Bed Mobility Not assessed   Transfer 1   Transfer From 1 Bed;Sit   Transfer Type 1 To and from   Transfer to 1 Stand   Technique 1 Stand to sit;Sit to stand   Transfer Device 1 Straight cane   Level of Assistance 1 Min assist x 2   Trials/Comments 1 From recliner   Ambulation 1   Surface 1 Level surface;Smooth   Device 1 Gait belt;Single point cane   Assistance 1 Min assist x 1;Standby assistance   Ambulation Distance (meters) 90 meters   Comments 1 1 LOB requiring asssit to correct. SOB.   Balance   Balance Impaired   Additional Activities   Additional Activities Comments PT SOB during mobility. HR 85, O2 95% on RA. D/C planning with pt and wife.   Activity Tolerance   Standing Endurance Patient limited by fatigue   Assessment   Rehab Potential Good   Progress Progressing toward goals;Slow progress, decreased activity tolerance   Problem List Decreased ADL status;Decreased endurance;Decreased functional mobility;Decreased IADLs   Recommendations for Therapy Continue skilled therapy;Safety issues - physical   Assessment Comment Pt with decreased activity tolerance, balance and independence. Pt will benefit from continued skilled OT services to maximize indep, safety  and activity tolerance for safer d/c.   Therapeutic Interventions (Time Spent in Minutes)   Cognitive function 8   Therapeutic Exercise 8   Therapeutic Activity Dynamic 8   Plan   Plan Comment ADLs   Treatment Interventions Therapeutic activity;Therapeutic exercise;Neuromuscular reeducation;Cognitive skills development;Endurance training;Patient/family training;Compensatory technique education   OT Frequency 3-5x/wk   Date POC Due 07/08/22
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
## 2022-07-05 NOTE — PLAN OF CARE
Problem: Neurosensory - Adult  Goal: Achieves stable or improved neurological status  Description: INTERVENTIONS  1. Assess for and report changes in neurological status  2. Initiate measures to prevent increased intracranial pressure  3. Maintain blood pressure and fluid volume within ordered parameters to optimize cerebral perfusion and minimize risk of hemorrhage  4. Monitor temperature, glucose, and sodium. Initiate appropriate interventions as ordered  Outcome: Progressing  Goal: Achieves maximal functionality and self care  Description: INTERVENTIONS  1. Monitor swallowing and airway patency with patient fatigue and changes in neurological status  2. Encourage and assist patient to increase activity and self care with guidance from PT/OT  3. Encourage visually impaired, hearing impaired and aphasic patients to use assistive/communication devices  Outcome: Progressing     Problem: Musculoskeletal - Adult  Goal: Return mobility to safest level of function  Description: INTERVENTIONS:  1. Assess patient stability and activity tolerance for standing, transferring and ambulating w/ or w/o assistive devices  2. Assist with transfers and ambulation using safe practices  3. Ensure adequate protection for wounds/incisions during mobilization  4. Obtain PT/OT and other consults as needed  5. Apply Continuous Passive Motion per order to increase flexion toward goal  6. Instruct patient/family in ordered activity level  Outcome: Progressing  Goal: Maintain proper alignment of affected body part  Description: INTERVENTIONS:  1. Support and protect limb and body alignment per provider order  2. Instruct and reinforce with patient and family use of appropriate assistive device and precautions (e.g. spinal or hip dislocation precautions)  Outcome: Progressing  Goal: Return ADL status to a safe level of function  Description: INTERVENTIONS:  1. Assess patient's ADL deficits and provide assistive devices as needed  2. Obtain  PT/OT consults as needed  3. Assist and instruct patient to increase activity and self care  Outcome: Progressing     Problem: Safety Adult - Fall  Goal: Free from fall injury  Description: INTERVENTIONS:    Inpatient - Please reference Cares/Safety Flowsheet under [place] Fall Risk for interventions.  Pediatrics - Please reference Peds Daily Cares/Safety Flowsheet under [facility] Pediatric Fall Assessment Fall Bundle for interventions  LD/OB - Please reference OB Shift Screening Flowsheet under OB Fall Risk for interventions.  Outcome: Progressing     Problem: Knowledge Deficit  Goal: Patient/family/caregiver demonstrates understanding of disease process, treatment plan, medications, and discharge instructions  Description: INTERVENTIONS:   1. Complete learning assessment and assess knowledge base  2. Provide teaching at level of understanding   3. Provide teaching via preferred learning methods  Outcome: Progressing     Problem: Potential for Compromised Skin Integrity  Goal: Skin Integrity is Maintained or Improved  Description: INTERVENTIONS:  1. Assess and monitor skin integrity  2. Collaborate with interdisciplinary team and initiate plans and interventions as needed  3. Alternate a full bath with partial baths for elderly   4. Monitor patient's hygiene practices   5. Collaborate with wound, ostomy, and continence nurse  Outcome: Progressing  Goal: Nutritional status is improving  Description: INTERVENTIONS:  1. Monitor and assess patient for malnutrition (ex- brittle hair, bruises, dry skin, pale skin and conjunctiva, muscle wasting, smooth red tongue, and disorientation)  2. Monitor patient's weight and dietary intake as ordered or per policy  3. Determine patient's food preferences and provide high-protein, high-caloric foods as appropriate  4. Assist patient with eating   5. Allow adequate time for meals   6. Encourage patient to take dietary supplement as ordered   7. Collaborate with dietitian  8. Include  patient/family/caregiver in decisions related to nutrition  Outcome: Progressing  Goal: MOBILITY IS MAINTAINED OR IMPROVED  Description: INTERVENTIONS  1. Collaborate with interdisciplinary team and initiate plan and interventions as ordered (PT/OT)  2. Encourage ambulation  3. Up to chair for meals  4. Monitor for signs of deconditioning  Outcome: Progressing     Problem: Urinary Incontinence  Goal: Perineal skin integrity is maintained or improved  Description: INTERVENTIONS:  1. Assess genitourinary system, perineal skin, labs (urinalysis), and history of incontinence to include past management, aggravating, and alleviating factors  2. Collaborate with interdisciplinary team including wound, ostomy, and continence nurse and initiate plans and interventions as needed  4. Consider urine containment device  5. Apply skin protectant   6. Develop skin care regimen  7. Provide privacy when changing patient's incontinence device to maintain their dignity  Outcome: Progressing     Problem: Dressing Upper Extremities  Goal: LTG - Patient will utilize adaptive techniques/equipment to dress upper body  Description: W/ min A  Outcome: Progressing     Problem: Mobility  Goal: STG - Patient will ambulate  Outcome: Progressing     Problem: TRANSFERS  Goal: STG - Patient will transfer to and from sit to stand  Outcome: Progressing  Goal: STG - Patient will perform bed mobility  Outcome: Progressing     Problem: Infection Control  Goal: MINIMIZE THE ACQUISITION AND TRANSMISSION OF INFECTIOUS AGENTS  Description: INTERVENTIONS:  1. Isolate patient with suspected/diagnosed communicable disease  2. Place on designated isolation precautions  3. Maintain isolation techniques  4. Perform hand hygiene before and after each patient care activity  5. [place] universal precautions  6. Wear PPE as directed for type of isolation  7. Administer antibiotic therapy as ordered  8. Clean the environment appropriately after each patient use  9.  Clean patient care equipment after each patient use as it leaves the room  10. Limit number of visitors, as appropriate  Outcome: Progressing

## 2022-07-05 NOTE — NURSING END OF SHIFT
Nursing End of Shift Summary:    Patient: Ralph Giron  MRN: [id number]  : 1950, Age: 72 y.o.    Location: [address] [place] [address]    Nursing Goals  Clinical Goals for the Shift: Pain managment and ambulation    Narrative Summary of Progress Toward Clinical Goals:  Pt up around the pods 2 times today. Was able to walk up 3 stairs with PT. Pt pain more managed today.     Barriers to Goals/Nursing Concerns:  No    New Patient or Family Concerns/Issues:  No    Shift Summary:   Significant Events & Communications to Providers (last 12 hours)     Last 5 Values    No documentation.             Oxygen Usage (last 12 hours)     Last 5 Values     Row Name 22 0735                   Oxygen Weaning Trial by Nursing    Is Patient on Room Air OR on the Same Amount of O2 as at Home? Yes                Mobility (last 12 hours)     Last 5 Values     Row Name 22 0735 22 1115 22 1600             Mobility    Activity -- Ambulate in anne --      Level of Assistance -- Independent after set-up --      Distance Ambulated (feet) -- 550 Feet --      Distance Ambulated (Meters Calculated) -- 167.64 Meters --      Patient Position Supine -- Supine      Distance Ambulated (Meters Calculated)(Do Not Use) -- 167.64 Feet --      Anti-Embolism Devices Applied Bilateral;AE calf pump -- --                Urethral Catheter     Active Urethral Catheter     None            Active Lines     Active Central venous catheter / Peripherally inserted central catheter / Implantable Port / Hemodialysis catheter / Midline Catheter     None              Infusing Medications   Medication Dose Last Rate   • sodium chloride 0.9 %  100 mL/hr 100 mL/hr (22 0837)     PRN Medications   Medication Dose Last Admin   • cyclobenzaprine  10 mg     • bisacodyL  10 mg 10 mg at 22 1644   • benzonatate  200 mg     • albuterol  2.5 mg 2.5 mg at 22 0728   • hydrALAZINE  10 mg 10 mg at 22 1318   • fentaNYL citrate (PF)  50 mcg 50 mcg at  07/01/22 1916   • sodium chloride  3 mL     • acetaminophen  325-650 mg 650 mg at 07/03/22 1720   • oxyCODONE  5-10 mg 10 mg at 07/04/22 1428   • HYDROmorphone  0.4-0.6 mg 0.5 mg at 07/04/22 0702   • ondansetron  4 mg      Or   • ondansetron  4 mg 4 mg at 07/03/22 1818   • alum-mag hydroxide-simeth  30 mL       _________________________  Odette Martinez RN  07/04/22 6:26 PM
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
## 2022-07-06 ENCOUNTER — HOSPITAL ENCOUNTER (INPATIENT)
Facility: HOSPITAL | Age: 72
LOS: 6 days | Discharge: HOME | DRG: 950 | End: 2022-07-12
Attending: PHYSICAL MEDICINE & REHABILITATION | Admitting: PHYSICAL MEDICINE & REHABILITATION
Payer: MEDICARE

## 2022-07-06 VITALS
SYSTOLIC BLOOD PRESSURE: 141 MMHG | HEART RATE: 72 BPM | RESPIRATION RATE: 20 BRPM | WEIGHT: 253.09 LBS | TEMPERATURE: 97.7 F | BODY MASS INDEX: 37.49 KG/M2 | OXYGEN SATURATION: 92 % | HEIGHT: 69 IN | DIASTOLIC BLOOD PRESSURE: 81 MMHG

## 2022-07-06 DIAGNOSIS — G47.33 OSA ON CPAP: Primary | ICD-10-CM

## 2022-07-06 DIAGNOSIS — I10 ESSENTIAL HYPERTENSION: Chronic | ICD-10-CM
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
## 2022-07-06 PROBLEM — R53.81 DEBILITY: Status: ACTIVE | Noted: 2022-07-06

## 2022-07-06 PROBLEM — R53.81 PHYSICAL DEBILITY: Status: ACTIVE | Noted: 2022-07-06

## 2022-07-06 LAB
ANION GAP SERPL CALC-SCNC: 6 MMOL/L (ref 3–11)
BUN SERPL-MCNC: 16 MG/DL (ref 7–25)
CALCIUM SERPL-MCNC: 8.8 MG/DL (ref 8.6–10.3)
CHLORIDE SERPL-SCNC: 103 MMOL/L (ref 98–107)
CO2 SERPL-SCNC: 29 MMOL/L (ref 21–32)
CREAT SERPL-MCNC: 0.84 MG/DL (ref 0.7–1.3)
ERYTHROCYTE [DISTWIDTH] IN BLOOD BY AUTOMATED COUNT: 15.3 % (ref 11.5–15)
GFR SERPL CREATININE-BSD FRML MDRD: 92 ML/MIN/1.73M*2
GLUCOSE SERPL-MCNC: 107 MG/DL (ref 70–105)
HCT VFR BLD AUTO: 31.9 % (ref 38–50)
HGB BLD-MCNC: 10.8 G/DL (ref 13.2–17.2)
MCH RBC QN AUTO: 27.9 PG (ref 29–34)
MCHC RBC AUTO-ENTMCNC: 33.8 G/DL (ref 32–36)
MCV RBC AUTO: 82.5 FL (ref 82–97)
PLATELET # BLD AUTO: 187 10*3/UL (ref 130–350)
PMV BLD AUTO: 6.6 FL (ref 6.9–10.8)
POTASSIUM SERPL-SCNC: 4.4 MMOL/L (ref 3.5–5.1)
RBC # BLD AUTO: 3.86 10*6/ΜL (ref 4.1–5.8)
SODIUM SERPL-SCNC: 138 MMOL/L (ref 135–145)
WBC # BLD AUTO: 7.4 10*3/UL (ref 3.7–9.6)

## 2022-07-06 PROCEDURE — 97530 THERAPEUTIC ACTIVITIES: CPT | Mod: GP | Performed by: PHYSICAL THERAPIST

## 2022-07-06 PROCEDURE — 2590000100 HC RX 259: Performed by: PHYSICAL MEDICINE & REHABILITATION

## 2022-07-06 PROCEDURE — 94660 CPAP INITIATION&MGMT: CPT | Performed by: DENTIST

## 2022-07-06 PROCEDURE — 99223 1ST HOSP IP/OBS HIGH 75: CPT | Mod: AI | Performed by: PHYSICAL MEDICINE & REHABILITATION

## 2022-07-06 PROCEDURE — 80048 BASIC METABOLIC PNL TOTAL CA: CPT | Performed by: INTERNAL MEDICINE

## 2022-07-06 PROCEDURE — 99238 HOSP IP/OBS DSCHRG MGMT 30/<: CPT | Performed by: NURSE PRACTITIONER

## 2022-07-06 PROCEDURE — (BLANK) HC ROOM PRIVATE REHAB FACILITY

## 2022-07-06 PROCEDURE — 51798 US URINE CAPACITY MEASURE: CPT

## 2022-07-06 PROCEDURE — 6370000100 HC RX 637 (ALT 250 FOR IP): Performed by: INTERNAL MEDICINE

## 2022-07-06 PROCEDURE — 85027 COMPLETE CBC AUTOMATED: CPT | Performed by: NURSE PRACTITIONER

## 2022-07-06 PROCEDURE — 97116 GAIT TRAINING THERAPY: CPT | Mod: GP | Performed by: PHYSICAL THERAPIST

## 2022-07-06 PROCEDURE — 2590000100 HC RX 259: Performed by: INTERNAL MEDICINE

## 2022-07-06 PROCEDURE — 4A0D7LZ MEASUREMENT OF URINARY VOLUME, VIA NATURAL OR ARTIFICIAL OPENING: ICD-10-PCS | Performed by: PHYSICAL MEDICINE & REHABILITATION

## 2022-07-06 PROCEDURE — 6370000100 HC RX 637 (ALT 250 FOR IP): Performed by: NURSE PRACTITIONER

## 2022-07-06 PROCEDURE — 6370000100 HC RX 637 (ALT 250 FOR IP): Performed by: PHYSICAL MEDICINE & REHABILITATION

## 2022-07-06 PROCEDURE — 36415 COLL VENOUS BLD VENIPUNCTURE: CPT | Performed by: INTERNAL MEDICINE

## 2022-07-06 RX ORDER — ALUMINUM HYDROXIDE, MAGNESIUM HYDROXIDE, AND SIMETHICONE 1200; 120; 1200 MG/30ML; MG/30ML; MG/30ML
30 SUSPENSION ORAL 3 TIMES DAILY PRN
Status: CANCELLED | OUTPATIENT
Start: 2022-07-06

## 2022-07-06 RX ORDER — ACETAMINOPHEN 325 MG/1
325-650 TABLET ORAL EVERY 4 HOURS PRN
Status: CANCELLED | OUTPATIENT
Start: 2022-07-06

## 2022-07-06 RX ORDER — LANOLIN ALCOHOL/MO/W.PET/CERES
1000 CREAM (GRAM) TOPICAL DAILY
Status: CANCELLED | OUTPATIENT
Start: 2022-07-07

## 2022-07-06 RX ORDER — CYCLOBENZAPRINE HCL 10 MG
10 TABLET ORAL 3 TIMES DAILY PRN
Status: CANCELLED | OUTPATIENT
Start: 2022-07-06

## 2022-07-06 RX ORDER — CYCLOBENZAPRINE HCL 10 MG
10 TABLET ORAL 3 TIMES DAILY PRN
Status: DISCONTINUED | OUTPATIENT
Start: 2022-07-06 | End: 2022-07-12 | Stop reason: HOSPADM

## 2022-07-06 RX ORDER — AMOXICILLIN 250 MG
1 CAPSULE ORAL 2 TIMES DAILY
Status: CANCELLED | OUTPATIENT
Start: 2022-07-06

## 2022-07-06 RX ORDER — FLUTICASONE PROPIONATE 50 MCG
2 SPRAY, SUSPENSION (ML) NASAL DAILY
Status: DISCONTINUED | OUTPATIENT
Start: 2022-07-07 | End: 2022-07-12 | Stop reason: HOSPADM

## 2022-07-06 RX ORDER — POLYETHYLENE GLYCOL (3350) 17 G/17G
17 POWDER, FOR SOLUTION ORAL DAILY
Status: DISCONTINUED | OUTPATIENT
Start: 2022-07-07 | End: 2022-07-12 | Stop reason: HOSPADM

## 2022-07-06 RX ORDER — VENLAFAXINE HYDROCHLORIDE 75 MG/1
225 CAPSULE, EXTENDED RELEASE ORAL DAILY
Status: CANCELLED | OUTPATIENT
Start: 2022-07-07

## 2022-07-06 RX ORDER — ADHESIVE BANDAGE
30 BANDAGE TOPICAL DAILY PRN
Status: DISCONTINUED | OUTPATIENT
Start: 2022-07-06 | End: 2022-07-12 | Stop reason: HOSPADM

## 2022-07-06 RX ORDER — ENOXAPARIN SODIUM 100 MG/ML
40 INJECTION SUBCUTANEOUS
Status: DISCONTINUED | OUTPATIENT
Start: 2022-07-07 | End: 2022-07-12 | Stop reason: HOSPADM

## 2022-07-06 RX ORDER — OXYCODONE HYDROCHLORIDE 5 MG/1
5-10 TABLET ORAL EVERY 4 HOURS PRN
Status: CANCELLED | OUTPATIENT
Start: 2022-07-06

## 2022-07-06 RX ORDER — PRAZOSIN HYDROCHLORIDE 1 MG/1
6 CAPSULE ORAL NIGHTLY
Status: CANCELLED | OUTPATIENT
Start: 2022-07-06

## 2022-07-06 RX ORDER — POLYETHYLENE GLYCOL (3350) 17 G/17G
17 POWDER, FOR SOLUTION ORAL DAILY
Status: CANCELLED | OUTPATIENT
Start: 2022-07-07

## 2022-07-06 RX ORDER — CYCLOBENZAPRINE HCL 10 MG
10 TABLET ORAL 3 TIMES DAILY PRN
Qty: 30 TABLET | Refills: 0 | Status: CANCELLED | OUTPATIENT
Start: 2022-07-06 | End: 2022-07-16

## 2022-07-06 RX ORDER — PRAZOSIN HYDROCHLORIDE 1 MG/1
6 CAPSULE ORAL NIGHTLY
Status: DISCONTINUED | OUTPATIENT
Start: 2022-07-06 | End: 2022-07-12 | Stop reason: HOSPADM

## 2022-07-06 RX ORDER — LANOLIN ALCOHOL/MO/W.PET/CERES
1000 CREAM (GRAM) TOPICAL DAILY
Status: DISCONTINUED | OUTPATIENT
Start: 2022-07-07 | End: 2022-07-12 | Stop reason: HOSPADM

## 2022-07-06 RX ORDER — ATENOLOL 50 MG/1
50 TABLET ORAL DAILY
Status: DISCONTINUED | OUTPATIENT
Start: 2022-07-07 | End: 2022-07-12 | Stop reason: HOSPADM

## 2022-07-06 RX ORDER — ALBUTEROL SULFATE 0.83 MG/ML
2.5 SOLUTION RESPIRATORY (INHALATION) EVERY 4 HOURS PRN
Status: CANCELLED | OUTPATIENT
Start: 2022-07-06

## 2022-07-06 RX ORDER — BISACODYL 10 MG/1
10 SUPPOSITORY RECTAL DAILY PRN
Status: DISCONTINUED | OUTPATIENT
Start: 2022-07-06 | End: 2022-07-06

## 2022-07-06 RX ORDER — ATENOLOL 50 MG/1
50 TABLET ORAL DAILY
Status: CANCELLED | OUTPATIENT
Start: 2022-07-07

## 2022-07-06 RX ORDER — BUPROPION HYDROCHLORIDE 150 MG/1
150 TABLET ORAL DAILY
Status: CANCELLED | OUTPATIENT
Start: 2022-07-07

## 2022-07-06 RX ORDER — BENZONATATE 100 MG/1
200 CAPSULE ORAL 3 TIMES DAILY PRN
Status: DISCONTINUED | OUTPATIENT
Start: 2022-07-06 | End: 2022-07-12 | Stop reason: HOSPADM

## 2022-07-06 RX ORDER — DICYCLOMINE HYDROCHLORIDE 10 MG/1
20 CAPSULE ORAL 3 TIMES DAILY
Status: CANCELLED | OUTPATIENT
Start: 2022-07-06

## 2022-07-06 RX ORDER — ATORVASTATIN CALCIUM 10 MG/1
10 TABLET, FILM COATED ORAL NIGHTLY
Status: CANCELLED | OUTPATIENT
Start: 2022-07-06

## 2022-07-06 RX ORDER — ALBUTEROL SULFATE 0.83 MG/ML
2.5 SOLUTION RESPIRATORY (INHALATION) EVERY 4 HOURS PRN
Status: DISCONTINUED | OUTPATIENT
Start: 2022-07-06 | End: 2022-07-12 | Stop reason: HOSPADM

## 2022-07-06 RX ORDER — LIDOCAINE 50 MG/G
2 PATCH TOPICAL DAILY
Status: DISCONTINUED | OUTPATIENT
Start: 2022-07-07 | End: 2022-07-12 | Stop reason: HOSPADM

## 2022-07-06 RX ORDER — BISACODYL 10 MG/1
10 SUPPOSITORY RECTAL DAILY PRN
Status: DISCONTINUED | OUTPATIENT
Start: 2022-07-06 | End: 2022-07-12 | Stop reason: HOSPADM

## 2022-07-06 RX ORDER — DICYCLOMINE HYDROCHLORIDE 10 MG/1
20 CAPSULE ORAL 3 TIMES DAILY
Status: DISCONTINUED | OUTPATIENT
Start: 2022-07-06 | End: 2022-07-12 | Stop reason: HOSPADM

## 2022-07-06 RX ORDER — ATORVASTATIN CALCIUM 10 MG/1
10 TABLET, FILM COATED ORAL NIGHTLY
Status: DISCONTINUED | OUTPATIENT
Start: 2022-07-06 | End: 2022-07-08

## 2022-07-06 RX ORDER — LANSOPRAZOLE 30 MG/1
30 CAPSULE, DELAYED RELEASE ORAL DAILY
Status: DISCONTINUED | OUTPATIENT
Start: 2022-07-07 | End: 2022-07-12 | Stop reason: HOSPADM

## 2022-07-06 RX ORDER — ACETAMINOPHEN 325 MG/1
325-650 TABLET ORAL EVERY 4 HOURS PRN
Status: DISCONTINUED | OUTPATIENT
Start: 2022-07-06 | End: 2022-07-12 | Stop reason: HOSPADM

## 2022-07-06 RX ORDER — ENOXAPARIN SODIUM 100 MG/ML
40 INJECTION SUBCUTANEOUS
Status: DISCONTINUED | OUTPATIENT
Start: 2022-07-06 | End: 2022-07-06

## 2022-07-06 RX ORDER — TRAZODONE HYDROCHLORIDE 100 MG/1
100 TABLET ORAL NIGHTLY
Status: DISCONTINUED | OUTPATIENT
Start: 2022-07-06 | End: 2022-07-12 | Stop reason: HOSPADM

## 2022-07-06 RX ORDER — BENZONATATE 100 MG/1
200 CAPSULE ORAL 3 TIMES DAILY PRN
Status: CANCELLED | OUTPATIENT
Start: 2022-07-06

## 2022-07-06 RX ORDER — OXYCODONE HYDROCHLORIDE 5 MG/1
5-10 TABLET ORAL EVERY 4 HOURS PRN
Status: DISCONTINUED | OUTPATIENT
Start: 2022-07-06 | End: 2022-07-12 | Stop reason: HOSPADM

## 2022-07-06 RX ORDER — LIDOCAINE 50 MG/G
2 PATCH TOPICAL DAILY
Status: CANCELLED | OUTPATIENT
Start: 2022-07-07

## 2022-07-06 RX ORDER — LANSOPRAZOLE 30 MG/1
30 CAPSULE, DELAYED RELEASE ORAL DAILY
Status: CANCELLED | OUTPATIENT
Start: 2022-07-07

## 2022-07-06 RX ORDER — AMOXICILLIN 250 MG
1 CAPSULE ORAL 2 TIMES DAILY
Status: DISCONTINUED | OUTPATIENT
Start: 2022-07-06 | End: 2022-07-12 | Stop reason: HOSPADM

## 2022-07-06 RX ORDER — BISACODYL 10 MG/1
10 SUPPOSITORY RECTAL DAILY PRN
Status: CANCELLED | OUTPATIENT
Start: 2022-07-06

## 2022-07-06 RX ORDER — TRAZODONE HYDROCHLORIDE 100 MG/1
100 TABLET ORAL NIGHTLY
Status: CANCELLED | OUTPATIENT
Start: 2022-07-06

## 2022-07-06 RX ORDER — ALUMINUM HYDROXIDE, MAGNESIUM HYDROXIDE, AND SIMETHICONE 1200; 120; 1200 MG/30ML; MG/30ML; MG/30ML
30 SUSPENSION ORAL 3 TIMES DAILY PRN
Status: DISCONTINUED | OUTPATIENT
Start: 2022-07-06 | End: 2022-07-12 | Stop reason: HOSPADM

## 2022-07-06 RX ORDER — FLUTICASONE PROPIONATE 50 MCG
2 SPRAY, SUSPENSION (ML) NASAL DAILY
Status: CANCELLED | OUTPATIENT
Start: 2022-07-07

## 2022-07-06 RX ORDER — ENOXAPARIN SODIUM 100 MG/ML
40 INJECTION SUBCUTANEOUS
Status: CANCELLED | OUTPATIENT
Start: 2022-07-06

## 2022-07-06 RX ORDER — LISINOPRIL 20 MG/1
20 TABLET ORAL DAILY
Status: DISCONTINUED | OUTPATIENT
Start: 2022-07-06 | End: 2022-07-07

## 2022-07-06 RX ORDER — BUPROPION HYDROCHLORIDE 150 MG/1
150 TABLET ORAL DAILY
Status: DISCONTINUED | OUTPATIENT
Start: 2022-07-07 | End: 2022-07-12 | Stop reason: HOSPADM

## 2022-07-06 RX ADMIN — LIDOCAINE 2 PATCH: 50 PATCH TOPICAL at 09:53

## 2022-07-06 RX ADMIN — OXYCODONE HYDROCHLORIDE 5 MG: 5 TABLET ORAL at 21:52

## 2022-07-06 RX ADMIN — OXYCODONE HYDROCHLORIDE 10 MG: 5 TABLET ORAL at 14:08

## 2022-07-06 RX ADMIN — CYANOCOBALAMIN TAB 1000 MCG 1000 MCG: 1000 TAB at 09:53

## 2022-07-06 RX ADMIN — PSYLLIUM HUSK 1 PACKET: 3.4 POWDER ORAL at 09:53

## 2022-07-06 RX ADMIN — DICYCLOMINE HYDROCHLORIDE 20 MG: 10 CAPSULE ORAL at 21:12

## 2022-07-06 RX ADMIN — OXYCODONE HYDROCHLORIDE 5 MG: 5 TABLET ORAL at 04:07

## 2022-07-06 RX ADMIN — LISINOPRIL 20 MG: 20 TABLET ORAL at 18:03

## 2022-07-06 RX ADMIN — PRAZOSIN HYDROCHLORIDE 6 MG: 1 CAPSULE ORAL at 21:12

## 2022-07-06 RX ADMIN — ATENOLOL 50 MG: 50 TABLET ORAL at 09:53

## 2022-07-06 RX ADMIN — SENNOSIDES AND DOCUSATE SODIUM 1 TABLET: 50; 8.6 TABLET ORAL at 21:12

## 2022-07-06 RX ADMIN — VENLAFAXINE HYDROCHLORIDE 225 MG: 75 CAPSULE, EXTENDED RELEASE ORAL at 09:53

## 2022-07-06 RX ADMIN — OXYCODONE HYDROCHLORIDE 5 MG: 5 TABLET ORAL at 18:03

## 2022-07-06 RX ADMIN — ATORVASTATIN CALCIUM 10 MG: 10 TABLET, FILM COATED ORAL at 21:12

## 2022-07-06 RX ADMIN — LANSOPRAZOLE 30 MG: 30 CAPSULE, DELAYED RELEASE ORAL at 09:53

## 2022-07-06 RX ADMIN — TRAZODONE HYDROCHLORIDE 100 MG: 100 TABLET ORAL at 21:12

## 2022-07-06 RX ADMIN — DICYCLOMINE HYDROCHLORIDE 20 MG: 10 CAPSULE ORAL at 09:53

## 2022-07-06 RX ADMIN — BUPROPION HYDROCHLORIDE 150 MG: 150 TABLET, EXTENDED RELEASE ORAL at 09:53

## 2022-07-06 ASSESSMENT — ENCOUNTER SYMPTOMS
HEADACHES: 0
CONFUSION: 0
ARTHRALGIAS: 1
SHORTNESS OF BREATH: 1
TROUBLE SWALLOWING: 0
SPEECH DIFFICULTY: 0
CONSTIPATION: 0
SLEEP DISTURBANCE: 1
FATIGUE: 1
DIFFICULTY URINATING: 0
DIZZINESS: 0
WOUND: 1

## 2022-07-06 NOTE — INTERDISCIPLINARY/THERAPY
Case Management Facility Discharge Note    Phone # [phone]    Accepting Facility:  [facility]  Accepting Physician: Dr. Elkins    Report number provided to: Roosevelt Grayson CNP  Orders faxed: Facility has Epic access.  Transportation: PHT at 1545  RN given number for report: Report number given to LUIS Pino  Support System Notified: CM updated pt and wife.
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
## 2022-07-06 NOTE — PLAN OF CARE
Problem: Neurosensory - Adult  Goal: Achieves stable or improved neurological status  Description: INTERVENTIONS  1. Assess for and report changes in neurological status  2. Initiate measures to prevent increased intracranial pressure  3. Maintain blood pressure and fluid volume within ordered parameters to optimize cerebral perfusion and minimize risk of hemorrhage  4. Monitor temperature, glucose, and sodium. Initiate appropriate interventions as ordered  Outcome: Progressing  Goal: Achieves maximal functionality and self care  Description: INTERVENTIONS  1. Monitor swallowing and airway patency with patient fatigue and changes in neurological status  2. Encourage and assist patient to increase activity and self care with guidance from PT/OT  3. Encourage visually impaired, hearing impaired and aphasic patients to use assistive/communication devices  Outcome: Progressing
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
## 2022-07-06 NOTE — INTERDISCIPLINARY/THERAPY
7/6/22-Patient will leave via PHT today at 3:45 pm.  will bring wheelchair to patient's room for pickup.    Please contact CM with any questions.       ([phone]): Abigail  Received transportation request from: Perla CHIANG  Verified with CM the following:   Patient name/room #: [id number] Ralph Giron  Discharge date: 7/6/22  Suggested mode: PHT      Discharge disposition (address-no PO box): [facility] [address] [facility]  Suggested  time (Is there a particular time that facility needs if applicable):  any time  Ambulatory ( will not assist): w/c  Wheelchair (size):     standard                                   Able to sit up for transport (consider hrs traveled) with good trunk control?  Yes  Escort required? (Escort requirements-greater than 2-hr drive; confusion/behavior; assistance to bathroom) Reason? No  Confirmed person at home if not ambulatory or needs assistance (name, phone #): N/A  DME (02/walkern): No  NEW O2 set up?               02 rate:  Precautions: Y/N   Type: No  (Standard, Contact, Droplet, Airborne - COVID is “special droplet/contact”)  Does patient have keys to get into home? N/A                                Called and spoke with [facility] at [phone] and arranged transportation-they can accommodate today at 3:45pm.  Sent form to [employer].     Notes:  Added note under Treatment Team Sticky Notes. Completed “Ticket to Ride” form and emailed to [facility] to print and provide to floor staff.     Updated Transportation tracker.

## 2022-07-06 NOTE — NURSING END OF SHIFT
Nursing End of Shift Summary:    Patient: Ralph Giron  MRN: [id number]  : 1950, Age: 72 y.o.    Location: [address] [place] [address]    Nursing Goals  Clinical Goals for the Shift: Pain management and ambulation    Narrative Summary of Progress Toward Clinical Goals:  Pt walking more today, pain more controlled on tylenol. Possible discharge to rehab tomorrow.     Barriers to Goals/Nursing Concerns:  No  New Patient or Family Concerns/Issues:  Awaiting rehab     Shift Summary:   Significant Events & Communications to Providers (last 12 hours)     Last 5 Values    No documentation.             Oxygen Usage (last 12 hours)     Last 5 Values     Row Name 22 0949                   Oxygen Weaning Trial by Nursing    Is Patient on Room Air OR on the Same Amount of O2 as at Home? Yes                Mobility (last 12 hours)     Last 5 Values     Row Name 22 0807 22 0949 22 1600             Mobility    Activity -- Ambulate in anne --      Level of Assistance -- Minimal assist, patient does 75% or more --      Distance Ambulated (feet) -- 50 Feet --      Distance Ambulated (Meters Calculated) -- 15.24 Meters --      Patient Position Sitting -- Supine      Turning -- Turns self --      Distance Ambulated (Meters Calculated)(Do Not Use) -- 15.24 Feet --      Anti-Embolism Devices Applied Bilateral;AE calf pump -- --                Urethral Catheter     Active Urethral Catheter     None            Active Lines     Active Central venous catheter / Peripherally inserted central catheter / Implantable Port / Hemodialysis catheter / Midline Catheter     None              Infusing Medications   Medication Dose Last Rate     PRN Medications   Medication Dose Last Admin   • cyclobenzaprine  10 mg     • bisacodyL  10 mg 10 mg at 22 1644   • benzonatate  200 mg     • albuterol  2.5 mg 2.5 mg at 22 0728   • hydrALAZINE  10 mg 10 mg at 22 1318   • fentaNYL citrate (PF)  50 mcg 50 mcg at 22 1916   •  sodium chloride  3 mL     • acetaminophen  325-650 mg 650 mg at 07/05/22 1538   • oxyCODONE  5-10 mg 10 mg at 07/05/22 0638   • HYDROmorphone  0.4-0.6 mg 0.5 mg at 07/04/22 0702   • ondansetron  4 mg      Or   • ondansetron  4 mg 4 mg at 07/03/22 1818   • alum-mag hydroxide-simeth  30 mL       _________________________  Odette Martinez RN  07/05/22 7:04 PM

## 2022-07-06 NOTE — PLAN OF CARE
Problem: Neurosensory - Adult  Goal: Achieves stable or improved neurological status  Description: INTERVENTIONS  1. Assess for and report changes in neurological status  2. Initiate measures to prevent increased intracranial pressure  3. Maintain blood pressure and fluid volume within ordered parameters to optimize cerebral perfusion and minimize risk of hemorrhage  4. Monitor temperature, glucose, and sodium. Initiate appropriate interventions as ordered  Outcome: Adequate for Discharge  Goal: Achieves maximal functionality and self care  Description: INTERVENTIONS  1. Monitor swallowing and airway patency with patient fatigue and changes in neurological status  2. Encourage and assist patient to increase activity and self care with guidance from PT/OT  3. Encourage visually impaired, hearing impaired and aphasic patients to use assistive/communication devices  Outcome: Adequate for Discharge     Problem: Musculoskeletal - Adult  Goal: Return mobility to safest level of function  Description: INTERVENTIONS:  1. Assess patient stability and activity tolerance for standing, transferring and ambulating w/ or w/o assistive devices  2. Assist with transfers and ambulation using safe practices  3. Ensure adequate protection for wounds/incisions during mobilization  4. Obtain PT/OT and other consults as needed  5. Apply Continuous Passive Motion per order to increase flexion toward goal  6. Instruct patient/family in ordered activity level  Outcome: Adequate for Discharge  Goal: Maintain proper alignment of affected body part  Description: INTERVENTIONS:  1. Support and protect limb and body alignment per provider order  2. Instruct and reinforce with patient and family use of appropriate assistive device and precautions (e.g. spinal or hip dislocation precautions)  Outcome: Adequate for Discharge  Goal: Return ADL status to a safe level of function  Description: INTERVENTIONS:  1. Assess patient's ADL deficits and  provide assistive devices as needed  2. Obtain PT/OT consults as needed  3. Assist and instruct patient to increase activity and self care  Outcome: Adequate for Discharge     Problem: Safety Adult - Fall  Goal: Free from fall injury  Description: INTERVENTIONS:    Inpatient - Please reference Cares/Safety Flowsheet under [place] Fall Risk for interventions.  Pediatrics - Please reference Peds Daily Cares/Safety Flowsheet under [facility] Pediatric Fall Assessment Fall Bundle for interventions  LD/OB - Please reference OB Shift Screening Flowsheet under OB Fall Risk for interventions.  Outcome: Adequate for Discharge     Problem: Knowledge Deficit  Goal: Patient/family/caregiver demonstrates understanding of disease process, treatment plan, medications, and discharge instructions  Description: INTERVENTIONS:   1. Complete learning assessment and assess knowledge base  2. Provide teaching at level of understanding   3. Provide teaching via preferred learning methods  Outcome: Adequate for Discharge     Problem: Potential for Compromised Skin Integrity  Goal: Skin Integrity is Maintained or Improved  Description: INTERVENTIONS:  1. Assess and monitor skin integrity  2. Collaborate with interdisciplinary team and initiate plans and interventions as needed  3. Alternate a full bath with partial baths for elderly   4. Monitor patient's hygiene practices   5. Collaborate with wound, ostomy, and continence nurse  Outcome: Adequate for Discharge  Goal: Nutritional status is improving  Description: INTERVENTIONS:  1. Monitor and assess patient for malnutrition (ex- brittle hair, bruises, dry skin, pale skin and conjunctiva, muscle wasting, smooth red tongue, and disorientation)  2. Monitor patient's weight and dietary intake as ordered or per policy  3. Determine patient's food preferences and provide high-protein, high-caloric foods as appropriate  4. Assist patient with eating   5. Allow adequate time for meals   6. Encourage  patient to take dietary supplement as ordered   7. Collaborate with dietitian  8. Include patient/family/caregiver in decisions related to nutrition  Outcome: Adequate for Discharge  Goal: MOBILITY IS MAINTAINED OR IMPROVED  Description: INTERVENTIONS  1. Collaborate with interdisciplinary team and initiate plan and interventions as ordered (PT/OT)  2. Encourage ambulation  3. Up to chair for meals  4. Monitor for signs of deconditioning  Outcome: Adequate for Discharge     Problem: Urinary Incontinence  Goal: Perineal skin integrity is maintained or improved  Description: INTERVENTIONS:  1. Assess genitourinary system, perineal skin, labs (urinalysis), and history of incontinence to include past management, aggravating, and alleviating factors  2. Collaborate with interdisciplinary team including wound, ostomy, and continence nurse and initiate plans and interventions as needed  4. Consider urine containment device  5. Apply skin protectant   6. Develop skin care regimen  7. Provide privacy when changing patient's incontinence device to maintain their dignity  Outcome: Adequate for Discharge     Problem: Dressing Upper Extremities  Goal: LTG - Patient will utilize adaptive techniques/equipment to dress upper body  Description: W/ min A  Outcome: Adequate for Discharge     Problem: Mobility  Goal: STG - Patient will ambulate  Outcome: Adequate for Discharge     Problem: TRANSFERS  Goal: STG - Patient will transfer to and from sit to stand  Outcome: Adequate for Discharge  Goal: STG - Patient will perform bed mobility  Outcome: Adequate for Discharge     Problem: Infection Control  Goal: MINIMIZE THE ACQUISITION AND TRANSMISSION OF INFECTIOUS AGENTS  Description: INTERVENTIONS:  1. Isolate patient with suspected/diagnosed communicable disease  2. Place on designated isolation precautions  3. Maintain isolation techniques  4. Perform hand hygiene before and after each patient care activity  5. [place] universal  precautions  6. Wear PPE as directed for type of isolation  7. Administer antibiotic therapy as ordered  8. Clean the environment appropriately after each patient use  9. Clean patient care equipment after each patient use as it leaves the room  10. Limit number of visitors, as appropriate  Outcome: Adequate for Discharge

## 2022-07-06 NOTE — NURSING END OF SHIFT
Nursing End of Shift Summary:    Patient: Ralph Giron  MRN: [id number]  : 1950, Age: 72 y.o.    Location: [address] [place] [address]    Nursing Goals  Clinical Goals for the Shift: maintain safety and comfort; encourage activity       Narrative Summary of Progress Toward Clinical Goals:  Pt slept well through night. Pt ambulating well with STBA and cane. Pain controlled on oral pain medication. Pt voiding well with low PRV amounts. VS w/in acceptable limits. Safety and comfort maintained. Will continue to monitor and assess.    Barriers to Goals/Nursing Concerns:  No    New Patient or Family Concerns/Issues:  No    Shift Summary:   Significant Events & Communications to Providers (last 12 hours)     Last 5 Values    No documentation.             Oxygen Usage (last 12 hours)     Last 5 Values     Row Name 22 0426                Oxygen Weaning Trial by Nursing    Is Patient on Room Air OR on the Same Amount of O2 as at Home? Yes Yes               Mobility (last 12 hours)     Last 5 Values     Row Name 22                   Mobility    Activity Chair;Ambulate in room        Level of Assistance Contact guard assist, steadying assist        Patient Position Sitting        Turning Turns self        Anti-Embolism Devices Applied Bilateral;AE calf pump                  Urethral Catheter     Active Urethral Catheter     None            Active Lines     Active Central venous catheter / Peripherally inserted central catheter / Implantable Port / Hemodialysis catheter / Midline Catheter     None              Infusing Medications   Medication Dose Last Rate     PRN Medications   Medication Dose Last Admin   • cyclobenzaprine  10 mg     • bisacodyL  10 mg 10 mg at 22 1644   • benzonatate  200 mg     • albuterol  2.5 mg 2.5 mg at 22 0728   • hydrALAZINE  10 mg 10 mg at 22 1318   • fentaNYL citrate (PF)  50 mcg 50 mcg at 22 1916   • sodium chloride  3 mL     • acetaminophen  325-650 mg  650 mg at 07/05/22 2025   • oxyCODONE  5-10 mg 5 mg at 07/06/22 0407   • HYDROmorphone  0.4-0.6 mg 0.5 mg at 07/04/22 0702   • ondansetron  4 mg      Or   • ondansetron  4 mg 4 mg at 07/03/22 1818   • alum-mag hydroxide-simeth  30 mL 30 mL at 07/05/22 2020     _________________________  Antonia Hickey LPN  07/06/22 4:32 AM

## 2022-07-07 LAB
ALBUMIN SERPL-MCNC: 3.3 G/DL (ref 3.5–5.3)
ALP SERPL-CCNC: 75 U/L (ref 45–115)
ALT SERPL-CCNC: 8 U/L (ref 7–52)
ANION GAP SERPL CALC-SCNC: 10 MMOL/L (ref 3–11)
ANISOCYTOSIS BLD QL SMEAR: ABNORMAL
AST SERPL-CCNC: 20 U/L
BILIRUB SERPL-MCNC: 0.82 MG/DL (ref 0.2–1.4)
BUN SERPL-MCNC: 15 MG/DL (ref 7–25)
CALCIUM ALBUM COR SERPL-MCNC: 9 MG/DL (ref 8.6–10.3)
CALCIUM SERPL-MCNC: 8.4 MG/DL (ref 8.6–10.3)
CHLORIDE SERPL-SCNC: 99 MMOL/L (ref 98–107)
CO2 SERPL-SCNC: 27 MMOL/L (ref 21–32)
CREAT SERPL-MCNC: 0.92 MG/DL (ref 0.7–1.3)
EOSINOPHIL # BLD MANUAL: 0.6 10*3/UL
EOSINOPHIL NFR BLD MANUAL: 7 % (ref 0–3)
ERYTHROCYTE [DISTWIDTH] IN BLOOD BY AUTOMATED COUNT: 15.2 % (ref 11.5–15)
GFR SERPL CREATININE-BSD FRML MDRD: 88 ML/MIN/1.73M*2
GLUCOSE SERPL-MCNC: 102 MG/DL (ref 70–105)
HCT VFR BLD AUTO: 33.7 % (ref 38–50)
HGB BLD-MCNC: 11.1 G/DL (ref 13.2–17.2)
LYMPHOCYTES # BLD MANUAL: 1.2 10*3/UL
LYMPHOCYTES NFR BLD MANUAL: 14 % (ref 15–47)
MCH RBC QN AUTO: 27.2 PG (ref 29–34)
MCHC RBC AUTO-ENTMCNC: 32.9 G/DL (ref 32–36)
MCV RBC AUTO: 82.7 FL (ref 82–97)
METAMYELOCYTES # BLD MANUAL: 0.3 10*3/UL
METAMYELOCYTES NFR BLD MANUAL: 3 % (ref 0–0)
MONOCYTES # BLD MANUAL: 0.3 10*3/UL
MONOCYTES NFR BLD MANUAL: 3 % (ref 5–13)
MYELOCYTES # BLD MANUAL: 0.1 10*3/UL
MYELOCYTES NFR BLD MANUAL: 1 % (ref 0–0)
NEUTROPHILS # BLD MANUAL: 6.12 10*3/UL
NEUTS BAND # BLD MANUAL: 0.4 10*3/UL
NEUTS BAND NFR BLD MANUAL: 5 % (ref 0–10)
NEUTS SEG # BLD MANUAL: 5.7 10*3/UL
NEUTS SEG NFR BLD MANUAL: 67 % (ref 46–70)
NRBC BLD MANUAL-RTO: 1 % (ref 0–0)
PLATELET # BLD AUTO: 206 10*3/UL (ref 130–350)
PLATELET CLUMP BLD QL SMEAR: ABNORMAL
PLATELET MORPHOLOGY IN BLOOD: NORMAL
PMV BLD AUTO: 6.6 FL (ref 6.9–10.8)
POLYCHROMASIA BLD QL SMEAR: ABNORMAL
POTASSIUM SERPL-SCNC: 3.9 MMOL/L (ref 3.5–5.1)
PROT SERPL-MCNC: 5.8 G/DL (ref 6–8.3)
RBC # BLD AUTO: 4.08 10*6/ΜL (ref 4.1–5.8)
RBC MORPH BLD: ABNORMAL
SODIUM SERPL-SCNC: 136 MMOL/L (ref 135–145)
TOTAL CELLS COUNTED BLD: 100 CELLS
WBC # BLD AUTO: 8.5 10*3/UL (ref 3.7–9.6)
WBC NRBC COR # BLD: 8.5 10*3/UL

## 2022-07-07 PROCEDURE — 36415 COLL VENOUS BLD VENIPUNCTURE: CPT | Performed by: PHYSICAL MEDICINE & REHABILITATION

## 2022-07-07 PROCEDURE — (BLANK) HC ROOM PRIVATE REHAB FACILITY

## 2022-07-07 PROCEDURE — 2590000100 HC RX 259: Performed by: PHYSICAL MEDICINE & REHABILITATION

## 2022-07-07 PROCEDURE — 80053 COMPREHEN METABOLIC PANEL: CPT | Performed by: PHYSICAL MEDICINE & REHABILITATION

## 2022-07-07 PROCEDURE — 97162 PT EVAL MOD COMPLEX 30 MIN: CPT | Mod: GP | Performed by: PHYSICAL THERAPIST

## 2022-07-07 PROCEDURE — 99233 SBSQ HOSP IP/OBS HIGH 50: CPT | Performed by: PHYSICAL MEDICINE & REHABILITATION

## 2022-07-07 PROCEDURE — 6370000100 HC RX 637 (ALT 250 FOR IP): Performed by: PHYSICAL MEDICINE & REHABILITATION

## 2022-07-07 PROCEDURE — 97530 THERAPEUTIC ACTIVITIES: CPT | Mod: GO

## 2022-07-07 PROCEDURE — 6360000200 HC RX 636 W HCPCS (ALT 250 FOR IP): Performed by: PHYSICAL MEDICINE & REHABILITATION

## 2022-07-07 PROCEDURE — 97129 THER IVNTJ 1ST 15 MIN: CPT | Mod: GO

## 2022-07-07 PROCEDURE — 97110 THERAPEUTIC EXERCISES: CPT | Mod: GO

## 2022-07-07 PROCEDURE — 6370000100 HC RX 637 (ALT 250 FOR IP): Performed by: INTERNAL MEDICINE

## 2022-07-07 PROCEDURE — 99223 1ST HOSP IP/OBS HIGH 75: CPT | Performed by: INTERNAL MEDICINE

## 2022-07-07 PROCEDURE — 97165 OT EVAL LOW COMPLEX 30 MIN: CPT | Mod: GO

## 2022-07-07 PROCEDURE — 97535 SELF CARE MNGMENT TRAINING: CPT | Mod: GO

## 2022-07-07 PROCEDURE — 97530 THERAPEUTIC ACTIVITIES: CPT | Mod: GP | Performed by: PHYSICAL THERAPIST

## 2022-07-07 PROCEDURE — 85025 COMPLETE CBC W/AUTO DIFF WBC: CPT | Performed by: PHYSICAL MEDICINE & REHABILITATION

## 2022-07-07 RX ORDER — LISINOPRIL 20 MG/1
40 TABLET ORAL DAILY
Status: DISCONTINUED | OUTPATIENT
Start: 2022-07-08 | End: 2022-07-12 | Stop reason: HOSPADM

## 2022-07-07 RX ORDER — ATORVASTATIN CALCIUM 20 MG/1
20 TABLET, FILM COATED ORAL NIGHTLY
Status: DISCONTINUED | OUTPATIENT
Start: 2022-07-07 | End: 2022-07-12 | Stop reason: HOSPADM

## 2022-07-07 RX ORDER — LISINOPRIL 20 MG/1
20 TABLET ORAL NIGHTLY
Status: COMPLETED | OUTPATIENT
Start: 2022-07-07 | End: 2022-07-07

## 2022-07-07 RX ADMIN — PRAZOSIN HYDROCHLORIDE 6 MG: 1 CAPSULE ORAL at 20:35

## 2022-07-07 RX ADMIN — DICYCLOMINE HYDROCHLORIDE 20 MG: 10 CAPSULE ORAL at 07:07

## 2022-07-07 RX ADMIN — ENOXAPARIN SODIUM 40 MG: 100 INJECTION SUBCUTANEOUS at 15:17

## 2022-07-07 RX ADMIN — ATORVASTATIN CALCIUM 20 MG: 20 TABLET, FILM COATED ORAL at 20:34

## 2022-07-07 RX ADMIN — DICYCLOMINE HYDROCHLORIDE 20 MG: 10 CAPSULE ORAL at 15:17

## 2022-07-07 RX ADMIN — CYANOCOBALAMIN TAB 1000 MCG 1000 MCG: 1000 TAB at 07:08

## 2022-07-07 RX ADMIN — LISINOPRIL 20 MG: 20 TABLET ORAL at 20:34

## 2022-07-07 RX ADMIN — SENNOSIDES AND DOCUSATE SODIUM 1 TABLET: 50; 8.6 TABLET ORAL at 20:34

## 2022-07-07 RX ADMIN — DICYCLOMINE HYDROCHLORIDE 20 MG: 10 CAPSULE ORAL at 20:34

## 2022-07-07 RX ADMIN — OXYCODONE HYDROCHLORIDE 10 MG: 5 TABLET ORAL at 17:16

## 2022-07-07 RX ADMIN — OXYCODONE HYDROCHLORIDE 5 MG: 5 TABLET ORAL at 21:59

## 2022-07-07 RX ADMIN — BUPROPION HYDROCHLORIDE 150 MG: 150 TABLET, EXTENDED RELEASE ORAL at 07:07

## 2022-07-07 RX ADMIN — FLUTICASONE PROPIONATE 2 SPRAY: 50 SPRAY, METERED NASAL at 07:06

## 2022-07-07 RX ADMIN — MAGNESIUM HYDROXIDE/ALUMINUM HYDROXICE/SIMETHICONE 30 ML: 120; 1200; 1200 SUSPENSION ORAL at 04:20

## 2022-07-07 RX ADMIN — OXYCODONE HYDROCHLORIDE 5 MG: 5 TABLET ORAL at 04:17

## 2022-07-07 RX ADMIN — TRAZODONE HYDROCHLORIDE 100 MG: 100 TABLET ORAL at 20:34

## 2022-07-07 RX ADMIN — ACETAMINOPHEN 650 MG: 325 TABLET ORAL at 09:00

## 2022-07-07 RX ADMIN — VENLAFAXINE HYDROCHLORIDE 225 MG: 150 CAPSULE, EXTENDED RELEASE ORAL at 07:07

## 2022-07-07 RX ADMIN — PSYLLIUM HUSK 1 PACKET: 3.4 POWDER ORAL at 07:07

## 2022-07-07 RX ADMIN — ATENOLOL 50 MG: 50 TABLET ORAL at 07:08

## 2022-07-07 RX ADMIN — LANSOPRAZOLE 30 MG: 30 CAPSULE, DELAYED RELEASE ORAL at 07:08

## 2022-07-07 RX ADMIN — ACETAMINOPHEN 650 MG: 325 TABLET ORAL at 20:33

## 2022-07-07 RX ADMIN — LIDOCAINE 2 PATCH: 50 PATCH TOPICAL at 07:07

## 2022-07-07 RX ADMIN — LISINOPRIL 20 MG: 20 TABLET ORAL at 07:08

## 2022-07-07 RX ADMIN — SENNOSIDES AND DOCUSATE SODIUM 1 TABLET: 50; 8.6 TABLET ORAL at 07:08

## 2022-07-07 RX ADMIN — POLYETHYLENE GLYCOL 3350 17 G: 17 POWDER, FOR SOLUTION ORAL at 07:07

## 2022-07-07 ASSESSMENT — ACTIVITIES OF DAILY LIVING (ADL): ADEQUATE_TO_COMPLETE_ADL: YES

## 2022-07-07 NOTE — INTERDISCIPLINARY/THERAPY
Occupational Therapy  Treatment Note (OT)      Patient Name: Ralph Giron  Age: 72 y.o.  Gender: male    ----------------------------------------------------------------------------------------------------------------------         07/07/22 1301   Orientation   What day of the week is it? 1   What is the year? 1   What state are we in? 1   Memory   Please remember these five objects, I will ask you to recall them later: Apple, Pen, Tie, House, Car apple;pen;tie;house;car   You have $100 and you buy a dozen apples for $3 and a tricycle for $20.   How much did you spend? 0   How much do you have left? 0   Recall   What were the five objects you were asked to remember?  4   I am going to give you a series of numbers and I would like you to give them to me backwards. For example, if I say 42, you would say 24.    8537 0   649 1   List Animals   Please name as many animals as you can in one minute.  3   Please draw a clock showing 3 o'clock.    Hour markers correct? 2   Time correct? 2   Figures   Show the patient a picture of a square, a triangle and a rectangle. Ask them to put an X in the triangle. 1   Ask the patient to identify which of the previous shapes is the largest.  1   Please recall as much of the following story as possible:    What was the female's name?  2   What kind of work did she do? 2   When did she go back to work? 2   What state did they live in?  2   SLUMS Total   SLUMS Total Score 25         _________________  DEVIN RYAN, OTR  07/07/22 2:05 PM
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
## 2022-07-07 NOTE — NURSING END OF SHIFT
Nursing End of Shift Summary:    Patient: Ralph Giron  MRN: [id number]  : 1950, Age: 72 y.o.    Location: [place] [address]    Nursing Goals  Clinical Goals for the Shift: patient safety and comfort    Narrative Summary of Progress Toward Clinical Goals:  Tylenol and ice pack for left ribs pain. Wear sling to left arm all the time. Have his needs known. Safety maintained.    Barriers to Goals/Nursing Concerns:  no    New Patient or Family Concerns/Issues:  no    Shift Summary:   Significant Events & Communications to Providers (last 12 hours)     Last 5 Values    No documentation.             Oxygen Usage (last 12 hours)     Last 5 Values    No documentation.             Mobility (last 12 hours)     Last 5 Values     Row Name 22 0705 22 0800                Mobility    Patient Position Sitting Other (Comment)       Turning -- Turns self                 Urethral Catheter     Active Urethral Catheter     None            Active Lines     Active Central venous catheter / Peripherally inserted central catheter / Implantable Port / Hemodialysis catheter / Midline Catheter     None              Infusing Medications   Medication Dose Last Rate     PRN Medications   Medication Dose Last Admin   • acetaminophen  325-650 mg 650 mg at 22 0900   • albuterol  2.5 mg     • alum-mag hydroxide-simeth  30 mL 30 mL at 22 0420   • benzonatate  200 mg     • cyclobenzaprine  10 mg     • oxyCODONE  5-10 mg 5 mg at 22 0417   • magnesium hydroxide  30 mL     • bisacodyL  10 mg       _________________________  Sujey Gray RN  22 5:05 PM

## 2022-07-07 NOTE — INTERDISCIPLINARY/THERAPY
Physical Therapy  INITIAL EVALUATION (PT)    Patient Name: Ralph Giron  Age: 72 y.o.  Gender: male    ----------------------------------------------------------------------------------------------------------------------    Patient stated goal for Physical Therapy: increase his stamina    ----------------------------------------------------------------------------------------------------------------------       07/07/22 1520   Time Calculation   Start Time 1520   Stop Time 1620   Time Calculation (min) 60 min   Pain Assessment Scale   Pain Scale 0-10   0-10 Pain Score 6   Has Pain Adversely Affected Your Usual Activity, Sleep, Mood or Stress in the Last 24 Hours? Yes   How Has Pain Adversely Affected You? Decreased mobility   Pain Type Acute pain   Pain Location Rib cage   Pain Orientation Left   Pain Descriptors Aching   Pain Frequency Constant/continuous   General   Chart Reviewed Yes   Therapy Treatment Diagnosis DX: S/P MCA, Left clavicle/scapular fx's s/p ORIF; mulitple left sided rib fx's.   Is this a Co-Treatment? No   Family/Caregiver Present No   Precautions   Reinforced Precautions Yes   Orthotic/Prosthetic Fall, NWB LUE   Other Precautions Patient had LUE sling donned prior to, throughout and following PT session.   Weight Bearing Precautions Yes   LUE Weight Bearing Non Weight Bearing (NWB)   Home Living   Type of Home Motor home/RV   Home Layout One level   Home Access Stairs to enter with rails   Entrance Stairs-Rails Right   Entrance Stairs-Number of Steps 6   Bathroom Shower/Tub Walk-in shower   Bathroom Toilet Standard   Bathroom Equipment Built-in shower seat;Hand-held shower hose   Home Adaptive Equipment Cane   Home Living Comments Lives in  full-time   Prior Function   Level of [place] Independent with ADLs and functional transfers;Independent with homemaking with ambulation   Lived With Spouse   Receives Help From Family   ADL Assistance Independent   IADL Assistance Independent    Driving Yes   Subjective Comments   RN Stated patient is medically cleared for therapy Yes   Subjective Comments pleasant and cooperative, agreeable to therapy   Cognition   Arousal/Alertness WFL   Bed Mobility   Bed Mobility Assessed   Bed Mobility 1   Bed Mobility From 1 Supine   Bed Mobility Type 1 To and from   Bed Mobility to 1 Short sit   Level of Assistance 1 Standby assistance   Bed Mobility Comments 1 SBA supine<>sit with head of bed elevated >45 degrees   Transfers   Transfer Assessed   Transfer 1   Transfer From 1 Bed;Sit   Transfer Type 1 To and from   Transfer to 1 Stand   Technique 1 Sit to stand;Stand to sit   Transfer Device 1 Transfer belt;Large base quad cane   Level of Assistance 1 Contact guard assist x 1   Transfers 2   Transfer From 2 Sit;Stand;Chair with arms   Transfer Type 2 To and from   Transfer to 2 Stand;Chair with arms   Technique 2 Stand pivot;Stand to sit   Transfer Device 2 Large base quad cane;Transfer belt   Level of Assistance 2 Contact guard assist x 1   Ambulation   Ambulation Assessed   Weight Bearing Precautions   LUE Weight Bearing Non Weight Bearing (NWB)   Ambulation 1   Surface 1 Level surface;Smooth   Device 1 LBQC;Gait belt   Assistance 1 Contact guard assist x 1   Ambulation Distance (meters) 150 meters   Comments 1 Was able to ambulate 150 meters over 5.5 minutes prior to requesting a seated therapeutic rest break.   Ambulation 2   Surface 2 Carpet;Smooth;Level surface   Device 2 LBQC   Assistance 2 Contact guard assist x 1   Ambulation Distance (meters) 10 meters   Stairs 1   Rails 1 Right   Device 1 Gait belt  (Right side hand rail)   Assistance 1 Contact guard assist x 1   Comment/# Steps 1 12   QI Functional Abilities and Goals: Mobility   Roll Left and Right: Admission Performance Supervision or touching assistance - 04  (NWB of LUE secondary to scapular fx)   Roll Left and Right: Discharge Goal Independent - 06   Sit to Lying: Admission Performance Independent  - 06   Sit to Lying: Discharge Goal Independent - 06   Lying to Sitting on the Edge of Bed: Admission Performance Independent - 06   Lying to Sitting on the Edge of Bed: Discharge Goal Independent - 06   Sit to Stand: Admission Performance Supervision or touching assistance - 04   Sit to Stand: Discharge Goal Independent - 06   Chair/bed to Chair Transfer: Admission Performance Supervision or touching assistance - 04   Chair/bed to Chair Transfer: Discharge Goal Independent - 06   Does the patient walk? 2   Walk 10 Feet: Admission Perfomance Supervision or touching assistance - 04   Walk 10 Feet: Dischage Goal Independent - 06   Walk 50 Feet with two turns: Admission Perfomance Supervision or touching assistance - 04   Walk 50 feet with two turns: Discharge Goal Independent - 06   Walk 150 Feet: Admission Perfomance Supervision or touching assistance - 04   Walk 150 Feet: Discharge Goal Independent - 06   Walking 10 Feet on Uneven Surfaces: Admission Performance Supervision or touching assistance - 04   Walk 10 Feet on Uneven Surfaces: Discharge Goal Independent - 06   1 Step (Curb): Admission Performance Supervision or touching assistance - 04   1 Step (Curb): Discharge Goal Independent - 06   4 Steps: Admission Performance Supervision or touching assistance - 04   4 Steps: Discharge Goal Independent - 06   12 Steps: Admission Performance Supervision or touching assistance - 04   12 Steps: Discharge Goal Independent - 06   Picking Up Object: Admission Performance Supervision or touching assistance - 04   Picking Up Object: Discharge Goal Independent - 06   Does the patient use a wheelchair/ scooter? 0   Balance   Balance Impaired   Dynamic Standing Balance   Dynamic Standing-Balance Surface Firm   Dynamic Standing-Balance Support No upper extremity supported   Dynamic Standing-Balance Forward lean   Dynamic Standing-Level of Assistance Verbal cueing;Contact guard x 1   Dynamic Standing-Comments Increase  unsteadiness noted with eyes closed and increased speed of movements.   RUE Assessment   RUE Assessment X   RUE AROM (degrees)   RUE AROM Comment Shoulder abduction ~ 0-125, shoulder flexion ~0-125   RUE Strength   RUE Strength Comment Shoulder flexion/abduction 3/5, elbow flexion/extension 4/5, wrist flexion/extension and  strength 5/5.   RLE Assessment   RLE Assessment WFL  (Grossly 5/5 in all planes)   LLE Assessment   LLE Assessment WFL  (grossly 5/5 in all planes)   Tests   Special Tests CASTRO Balanace Assesment = 52   Functional Tests 30 second sit<>stand = 9 reps with no arms   Activity Tolerance   Activity Tolerance Comments Demonstrates fair tolerance to all assessments and functional tasks with increased SOB noted on exertion. Required occasional therapeutic rest breaks throughout for improved tolerance.  Sats at one point after amb dropped to 86, recovered with 30 seconds deep breathing standing   Assessment   Rehab Potential Excellent   Problem List Decreased strength;Decreased endurance;Decreased mobility;Impaired balance;Pain   Assessment Comment Patient tolerated initial assessment well with occasional rest breaks required throughout secondary to SOB/fatigue. Utilized LBQC initially during eval but transitioned to SBQC for further gait training/transfers. All functional tasks were CGA x1 throughout.   Therapeutic Interventions (Time Spent in Minutes)   Therapeutic Activity 30         _________________  FALSH Ibrahim  07/07/22 4:31 PM

## 2022-07-07 NOTE — INTERDISCIPLINARY/THERAPY
This CM met at bedside with patient and spouse Shantel came in during meeting. Reviewed Rehab process to include Team conference Tuesday with rounding by this CM after. Patient lives with spouse Shantel in , which is parked at [facility]. Shantel can drive  and they were planning to travel to [facility]. 20 July, They have 1 daughter here in  with 3 grandchildren. They have a daughter in [place] where they plan to buy a home across from her.  has 6 steps to enter/ Raised toilet. Walk in shower with seat. Pharmacy [facility]. PCP Dr Roosevelt Dahl.
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
## 2022-07-07 NOTE — NURSING END OF SHIFT
Nursing End of Shift Summary:    Patient: Ralph Giron  MRN: [id number]  : 1950, Age: 72 y.o.    Location: [place] [address]    Nursing Goals  Clinical Goals for the Shift: Nursing will monitor post void residuals, with goal volumes <100cc.  Nursing will monitor for increasing pain, offering PRN interventions as appropriate, stated goal pain level 4 or less.    Narrative Summary of Progress Toward Clinical Goals:  Pt cleared bladder scans with x 2 scans <100cc.  PRN Paige and cold packs in use for L rib pain.  High blood pressure start of shift, rechecked manually.  Would recommend staff check BP manually.  Note left on [facility] 24-hour board.    Barriers to Goals/Nursing Concerns:  HTN    New Patient or Family Concerns/Issues:  Rib pain.     Shift Summary:   Significant Events & Communications to Providers (last 12 hours)     Last 5 Values    No documentation.             Oxygen Usage (last 12 hours)     Last 5 Values     Row Name 22 1930                   Oxygen Weaning Trial by Nursing    Is Patient on Room Air OR on the Same Amount of O2 as at Home? Yes                Mobility (last 12 hours)     Last 5 Values     Row Name 22 0000                   Mobility    Level of Assistance Contact guard assist, steadying assist        Patient Position Supine        Turning Turns self                  Urethral Catheter     Active Urethral Catheter     None            Active Lines     Active Central venous catheter / Peripherally inserted central catheter / Implantable Port / Hemodialysis catheter / Midline Catheter     None              Infusing Medications   Medication Dose Last Rate     PRN Medications   Medication Dose Last Admin   • acetaminophen  325-650 mg     • albuterol  2.5 mg     • alum-mag hydroxide-simeth  30 mL 30 mL at 22 0420   • benzonatate  200 mg     • cyclobenzaprine  10 mg     • oxyCODONE  5-10 mg 5 mg at 22 0417   • magnesium hydroxide  30 mL     • bisacodyL  10 mg        _________________________  Corinne J Ader, RN  07/07/22 5:22 AM
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
## 2022-07-07 NOTE — PLAN OF CARE
Problem: Pain - Adult  Goal: Verbalizes/displays adequate comfort level or baseline comfort level  Description: INTERVENTIONS:  1. Encourage patient to monitor pain and request interventions  2. Assess pain using the appropriate pain scale  3. Administer analgesics based on type and severity of pain and evaluate response  4. Educate/Implement non-pharmacological measures as appropriate and evaluate response  5. Consider cultural, developmental and social influences on pain and pain management  6. Notify Provider if interventions unsuccessful or patient reports new pain  Outcome: Progressing  Flowsheets (Taken 7/7/2022 0048)  Verbalizes/displays adequate comfort level or baseline comfort level:  • Encourage patient to monitor pain and request interventions  • Assess pain using the appropriate pain scale  • Administer analgesics based on type and severity of pain and evaluate response  • Educate/Implement non-pharmacological measures as appropriate and evaluate response  • Consider cultural, developmental and social influences on pain and pain management  Note: Pt reports overall improvement in rib pain, however, increases to 8/10 with movement this shift.      Problem: Infection - Adult  Goal: Absence of infection during hospitalization  Description: INTERVENTIONS:  1. Assess and monitor for signs and symptoms of infection  2. Monitor lab/diagnostic results  3. Monitor all insertion sites/wounds/incisions  4. Monitor secretions for changes in amount and color  5. Administer medications as ordered  6. Educate and encourage patient and family to use good hand hygiene technique  7. Identify and educate in appropriate isolation precautions for identified infection/condition  Outcome: Progressing  Flowsheets (Taken 7/7/2022 0048)  Absence of infection during hospitalization:  • Assess and monitor for signs and symptoms of infection  • Monitor lab/diagnostic results     Problem: Safety Adult - Fall  Goal: Free from fall  injury  Description: INTERVENTIONS:    Inpatient - Please reference Cares/Safety Flowsheet under [facility] Fall Risk for interventions.  Pediatrics - Please reference Peds Daily Cares/Safety Flowsheet under [facility] Fall Assessment Fall Bundle for interventions  LD/OB - Please reference OB Shift Screening Flowsheet under OB Fall Risk for interventions.  Outcome: Progressing     Problem: Respiratory - Adult  Goal: Achieves optimal ventilation and oxygenation with noninvasive CPAP/BiLEVEL support  Description: INTERVENTIONS:  1. Provide education to patient/family about rationale and expected outcomes associated with therapy  2. Position patient to facilitate optimal ventilation/oxygenation status and minimize respiratory effort  3. Position patient to reduce aspiration risk, elevate head of bed at least 35 degrees or higher, as applicable  4. Assess effectiveness of therapy on ventilation/oxygenation status based on oxygen saturation and/or arterial blood gases, as indicated  5. Assess patient for changes in respiratory and physiological status  6. Auscultate breath sounds and assess chest excursion, as indicated  7. Assess patient for changes in mentation and behavior  8. Routinely monitor equipment for proper performance and settings  9. Assess and monitor skin condition, in relationship to the respiratory interface  10. Assure equipment alarm volume is adequate for the patient's environment  11. Immediately respond to equipment alarm, to assess patient and/or cause for alarm  12. Follow universal infection control/hospital policy(ies)/standards  Outcome: Progressing  Flowsheets (Taken 7/7/2022 0048)  Achieves Optimal Oxygenation with Noninvasive CPAP/BiLEVEL Support:  • Provide education to patient/family about rationale and expected outcomes associated with therapy  • Position patient to facilitate optimal ventilation/oxygenation status and minimize respiratory effort  • Assess patient for changes in  respiratory and physiological status  • Auscultate breath sounds and assess chest excursion, as indicated  • Assess patient for changes in mentation and behavior  • Routinely monitor equipment for proper performance and settings  • Assess and monitor skin condition, in relationship to the respiratory interface  • Follow universal infection control/hospital policy(ies)/standards     Problem: Musculoskeletal - Adult  Goal: Return mobility to safest level of function  Description: INTERVENTIONS:  1. Assess patient stability and activity tolerance for standing, transferring and ambulating w/ or w/o assistive devices  2. Assist with transfers and ambulation using safe practices  3. Ensure adequate protection for wounds/incisions during mobilization  4. Obtain PT/OT and other consults as needed  5. Apply Continuous Passive Motion per order to increase flexion toward goal  6. Instruct patient/family in ordered activity level  Outcome: Progressing  Flowsheets (Taken 7/7/2022 0048)  Return mobility to safest level of function:  • Assist with transfers and ambulation using safe practices  • Instruct patient/family in ordered activity level  Goal: Maintain proper alignment of affected body part  Description: INTERVENTIONS:  1. Support and protect limb and body alignment per provider order  2. Instruct and reinforce with patient and family use of appropriate assistive device and precautions (e.g. spinal or hip dislocation precautions)  Outcome: Progressing  Flowsheets (Taken 7/7/2022 0048)  Maintain proper alignment of affected body part: Support and protect limb and body alignment per provider's orders  Note: Sling and pillow support for LUE positioning.      Problem: Safety Adult - Fall  Goal: Free from fall injury  Description: INTERVENTIONS:    Inpatient - Please reference Cares/Safety Flowsheet under [facility] Fall Risk for interventions.  Pediatrics - Please reference Peds Daily Cares/Safety Flowsheet under [facility]  Fall Assessment Fall Bundle for interventions  LD/OB - Please reference OB Shift Screening Flowsheet under OB Fall Risk for interventions.  Outcome: Completed  Note: Duplicated entry in care plan.     See added and updated Inpt Rehab Nursing Plan of Care Statement.
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
## 2022-07-07 NOTE — PLAN OF CARE
Problem: Pain - Adult  Goal: Verbalizes/displays adequate comfort level or baseline comfort level  Description: INTERVENTIONS:  1. Encourage patient to monitor pain and request interventions  2. Assess pain using the appropriate pain scale  3. Administer analgesics based on type and severity of pain and evaluate response  4. Educate/Implement non-pharmacological measures as appropriate and evaluate response  5. Consider cultural, developmental and social influences on pain and pain management  6. Notify Provider if interventions unsuccessful or patient reports new pain  Outcome: Progressing  Flowsheets (Taken 7/6/2022 1839)  Verbalizes/displays adequate comfort level or baseline comfort level:   Encourage patient to monitor pain and request interventions   Assess pain using the appropriate pain scale   Administer analgesics based on type and severity of pain and evaluate response   Educate/Implement non-pharmacological measures as appropriate and evaluate response   Consider cultural, developmental and social influences on pain and pain management   Notify Provider if interventions unsuccessful or patient reports new pain     Problem: Infection - Adult  Goal: Absence of infection during hospitalization  Description: INTERVENTIONS:  1. Assess and monitor for signs and symptoms of infection  2. Monitor lab/diagnostic results  3. Monitor all insertion sites/wounds/incisions  4. Monitor secretions for changes in amount and color  5. Administer medications as ordered  6. Educate and encourage patient and family to use good hand hygiene technique  7. Identify and educate in appropriate isolation precautions for identified infection/condition  Outcome: Progressing  Flowsheets (Taken 7/6/2022 1839)  Absence of infection during hospitalization:   Assess and monitor for signs and symptoms of infection   Monitor lab/diagnostic results   Monitor all insertion sites/wounds/incisions   Monitor secretions for changes in amount  and colo   Administer medications as ordered   Educate and encourage patient and family to use good hand hygiene technique   Identify and educate in appropriate isolation precautions for identified infection/condition     Problem: Safety Adult - Fall  Goal: Free from fall injury  Description: INTERVENTIONS:    Inpatient - Please reference Cares/Safety Flowsheet under [facility] Fall Risk for interventions.  Pediatrics - Please reference Peds Daily Cares/Safety Flowsheet under [facility] Pediatric Fall Assessment Fall Bundle for interventions  LD/OB - Please reference OB Shift Screening Flowsheet under OB Fall Risk for interventions.  Outcome: Progressing     Problem: Cardiovascular - Adult  Goal: Maintains optimal cardiac output and hemodynamic stability  Description: INTERVENTIONS:  1. Monitor vital signs and rhythm  2. Monitor for hypotension and other signs of decreased cardiac output  3. Administer and titrate ordered vasoactive medications to optimize hemodynamic stability  4. Monitor for fluid overload/dehydration, weight gain, shortness of breath and activity intolerance  5. Monitor arterial and/or venous puncture sites for bleeding and/or hematoma  6. Assess quality of pulses, capillary refill, edema, sensation, skin color and temperature  7. Assess for signs of decreased coronary artery perfusion - ex. angina  Outcome: Progressing  Flowsheets (Taken 7/6/2022 3037)  Maintain optimal cardiac output and hemodynamic stability:   Monitor vital signs and rhythm   Monitor for hypotension and other signs of decreased cardiac output   Administer and titrate ordered vasoactive medications to optimize hemodynamic stability   Monitor for fluid overload/dehydration, weight gain, shortness of breath and activity intolerance   Monitor arterial and/or venous puncture sites for bleeding and/or hematoma   Assess quality of pulses, capillary refill, edema, sensation, skin color and temperature   Assess for signs of decreased  coronary artery perfusion - ex. angina     Problem: Respiratory - Adult  Goal: Achieves optimal ventilation and oxygenation  Description: INTERVENTIONS:  1. Assess for changes in respiratory status  2. Assess for changes in mentation and behavior  3. Position to facilitate oxygenation and minimize respiratory effort  4. Oxygen supplementation based on oxygen saturation or ABGs  5. Assess patient's ability to cough effectively  6. Encourage broncho-pulmonary hygiene including cough, deep breathe  7. Assess the need for suctioning   8. Assess and instruct to report SOB or any respiratory difficulty  9. Respiratory Therapy support as indicated, including medications and treatment.  Outcome: Progressing  Flowsheets (Taken 7/6/2022 8100)  Achieves optimal ventilation and oxygenation:   Assess for changes in respiratory status   Assess for changes in mentation and behavior   Position to facilitate oxygenation and minimize respiratory effort   Oxygen supplementation based on oxygen saturation or arterial blood gases   Assess patient's ability to cough effectively   Encourage broncho-pulmonary hygiene including cough, deep breathe   Assess the need for suctioning   Assess and instruct to report shortness of breath or any respiratory difficulty

## 2022-07-07 NOTE — NURSING END OF SHIFT
Nursing End of Shift Summary:    Patient: Ralph Giron  MRN: [id number]  : 1950, Age: 72 y.o.    Location: [place] [address]    Nursing Goals  Clinical Goals for the Shift: patient safety and comfort    Narrative Summary of Progress Toward Clinical Goals:  Patient came with high BP. Dr. Elkins notified. Patient asymptomatic. Given Lisinopril for high BP and Roxicodone for left ribs pain. Given report to oncoming shift to follow up.    Barriers to Goals/Nursing Concerns:  no    New Patient or Family Concerns/Issues:  no    Shift Summary:   Significant Events & Communications to Providers (last 12 hours)     Last 5 Values     Row Name 22 1630                   Provider Notification    Reason for Communication Evaluate  high BP  -OS        Provider Name Dr. Elkins  -OS              User Key  (r) = Recorded By, (t) = Taken By, (c) = Cosigned By    Initials Name    OS Sujey Gray RN            Oxygen Usage (last 12 hours)     Last 5 Values    No documentation.             Mobility (last 12 hours)     Last 5 Values     Row Name 22 1630 22 1631 22 1632 22 1633          Mobility    Patient Position Supine Sitting Standing Sitting     Turning Turns self -- -- --               Urethral Catheter     Active Urethral Catheter     None            Active Lines     Active Central venous catheter / Peripherally inserted central catheter / Implantable Port / Hemodialysis catheter / Midline Catheter     None              Infusing Medications   Medication Dose Last Rate     PRN Medications   Medication Dose Last Admin   • acetaminophen  325-650 mg     • albuterol  2.5 mg     • alum-mag hydroxide-simeth  30 mL     • benzonatate  200 mg     • cyclobenzaprine  10 mg     • oxyCODONE  5-10 mg 5 mg at 22 1803   • magnesium hydroxide  30 mL     • bisacodyL  10 mg       _________________________  Sujey Gray RN  22 6:40 PM

## 2022-07-08 PROCEDURE — 97110 THERAPEUTIC EXERCISES: CPT | Mod: GO

## 2022-07-08 PROCEDURE — 97530 THERAPEUTIC ACTIVITIES: CPT

## 2022-07-08 PROCEDURE — 6370000100 HC RX 637 (ALT 250 FOR IP): Performed by: INTERNAL MEDICINE

## 2022-07-08 PROCEDURE — 97535 SELF CARE MNGMENT TRAINING: CPT | Mod: GO

## 2022-07-08 PROCEDURE — 97530 THERAPEUTIC ACTIVITIES: CPT | Mod: GO

## 2022-07-08 PROCEDURE — (BLANK) HC ROOM PRIVATE REHAB FACILITY

## 2022-07-08 PROCEDURE — 99233 SBSQ HOSP IP/OBS HIGH 50: CPT | Performed by: PHYSICAL MEDICINE & REHABILITATION

## 2022-07-08 PROCEDURE — 97110 THERAPEUTIC EXERCISES: CPT

## 2022-07-08 PROCEDURE — 6370000100 HC RX 637 (ALT 250 FOR IP): Performed by: PHYSICAL MEDICINE & REHABILITATION

## 2022-07-08 PROCEDURE — 97116 GAIT TRAINING THERAPY: CPT

## 2022-07-08 PROCEDURE — 6360000200 HC RX 636 W HCPCS (ALT 250 FOR IP): Performed by: PHYSICAL MEDICINE & REHABILITATION

## 2022-07-08 PROCEDURE — 99232 SBSQ HOSP IP/OBS MODERATE 35: CPT | Performed by: INTERNAL MEDICINE

## 2022-07-08 RX ORDER — AMLODIPINE BESYLATE 5 MG/1
5 TABLET ORAL DAILY
Status: DISCONTINUED | OUTPATIENT
Start: 2022-07-08 | End: 2022-07-12 | Stop reason: HOSPADM

## 2022-07-08 RX ADMIN — OXYCODONE HYDROCHLORIDE 10 MG: 5 TABLET ORAL at 20:51

## 2022-07-08 RX ADMIN — CYCLOBENZAPRINE 10 MG: 10 TABLET, FILM COATED ORAL at 20:51

## 2022-07-08 RX ADMIN — LIDOCAINE 2 PATCH: 50 PATCH TOPICAL at 08:28

## 2022-07-08 RX ADMIN — ACETAMINOPHEN 650 MG: 325 TABLET ORAL at 11:56

## 2022-07-08 RX ADMIN — AMLODIPINE BESYLATE 5 MG: 5 TABLET ORAL at 07:43

## 2022-07-08 RX ADMIN — ACETAMINOPHEN 650 MG: 325 TABLET ORAL at 07:17

## 2022-07-08 RX ADMIN — VENLAFAXINE HYDROCHLORIDE 225 MG: 150 CAPSULE, EXTENDED RELEASE ORAL at 07:43

## 2022-07-08 RX ADMIN — TRAZODONE HYDROCHLORIDE 100 MG: 100 TABLET ORAL at 20:51

## 2022-07-08 RX ADMIN — OXYCODONE HYDROCHLORIDE 5 MG: 5 TABLET ORAL at 07:17

## 2022-07-08 RX ADMIN — DICYCLOMINE HYDROCHLORIDE 20 MG: 10 CAPSULE ORAL at 07:43

## 2022-07-08 RX ADMIN — ATORVASTATIN CALCIUM 20 MG: 20 TABLET, FILM COATED ORAL at 20:51

## 2022-07-08 RX ADMIN — DICYCLOMINE HYDROCHLORIDE 20 MG: 10 CAPSULE ORAL at 20:51

## 2022-07-08 RX ADMIN — OXYCODONE HYDROCHLORIDE 5 MG: 5 TABLET ORAL at 11:56

## 2022-07-08 RX ADMIN — CYANOCOBALAMIN TAB 1000 MCG 1000 MCG: 1000 TAB at 07:43

## 2022-07-08 RX ADMIN — LANSOPRAZOLE 30 MG: 30 CAPSULE, DELAYED RELEASE ORAL at 07:17

## 2022-07-08 RX ADMIN — FLUTICASONE PROPIONATE 2 SPRAY: 50 SPRAY, METERED NASAL at 08:28

## 2022-07-08 RX ADMIN — DICYCLOMINE HYDROCHLORIDE 20 MG: 10 CAPSULE ORAL at 15:41

## 2022-07-08 RX ADMIN — ATENOLOL 50 MG: 50 TABLET ORAL at 07:43

## 2022-07-08 RX ADMIN — ENOXAPARIN SODIUM 40 MG: 100 INJECTION SUBCUTANEOUS at 15:41

## 2022-07-08 RX ADMIN — LISINOPRIL 40 MG: 20 TABLET ORAL at 07:43

## 2022-07-08 RX ADMIN — PRAZOSIN HYDROCHLORIDE 6 MG: 1 CAPSULE ORAL at 20:51

## 2022-07-08 RX ADMIN — BUPROPION HYDROCHLORIDE 150 MG: 150 TABLET, EXTENDED RELEASE ORAL at 07:43

## 2022-07-08 NOTE — INTERDISCIPLINARY/THERAPY
Physical Therapy  Treatment Note (PT)    Patient Name: Ralph Giron  Age: 72 y.o.  Gender: male    ----------------------------------------------------------------------------------------------------------------------       07/08/22 1045   Time Calculation   Start Time 1045   Stop Time 1130   Time Calculation (min) 45 min   Pain Assessment Scale   Pain Scale 0-10   0-10 Pain Score 3  (Patient reported slight increase in pain to 4.5/10 following PT session.)   Has Pain Adversely Affected Your Usual Activity, Sleep, Mood or Stress in the Last 24 Hours? Yes   How Has Pain Adversely Affected You? Decreased mobility   Pain Type Acute pain   Pain Location Rib cage   Pain Orientation Left;Posterior   Pain Descriptors Aching   General   Is this a Co-Treatment? No   Precautions   Reinforced Precautions Yes   Weight Bearing Precautions Yes   LUE Weight Bearing Non Weight Bearing (NWB)  (Occasional verbal cues throughout PT session to maintain NWB of LUE.)   Subjective Comments   RN Stated patient is medically cleared for therapy Yes   Subjective Comments Patient reported slight increase in left rib pain throughout PT session, provided patient with ice pack following session to reduce pain/discomfort. Educated patient on removal within 20 minutes and with increased pain/discomfort.   Transfers   Transfer Assessed   Transfer 1   Transfer From 1 Chair with arms   Transfer Type 1 To and from   Transfer to 1 Stand;Chair with arms   Technique 1 Sit to stand;Stand to sit   Transfer Device 1 Small base quad cane   Level of Assistance 1 General supervision   Trials/Comments 1 Sit<>stand transfers from recliner and from various seat heights within therapy gym. Verbal cues on occasion for maintaining NWB of LUE, as patient did not have LUE sling donned throughout this PT session. No unsteadiness nor LOB observed throughout.   Transfers 2   Transfer From 2 Sit;Stand;Chair with arms;Chair without arms   Transfer Type 2 To and from    Technique 2 Stand pivot   Transfer Device 2 Small base quad cane   Level of Assistance 2 General supervision   Trials/Comments 2 Stand-pivot-sit transfers to patient's recliner in room as well as to/from mat table in therapy gym. Verbal cues on occasion for maintaining NWB of LUE, as patient did not have LUE sling donned throughout this PT session. No unsteadiness nor LOB observed throughout.   Ambulation   Ambulation Assessed   Weight Bearing Precautions   LUE Weight Bearing Non Weight Bearing (NWB)   Ambulation 1   Surface 1 Level surface;Smooth   Device 1 No device;SBQC   Assistance 1 Standby assistance;General supervision   Quality of Gait 1 Demonstrates reduced alivia/gait speed secondary to increased SOB with increased distance. No unsteadiness nor LOB observed with and without SBQC.   Comments 1 Patient completed 50 feet x2 with SBQC and 50 feet x2 with no AD. Demonstrates no unsteadiness nor LOB throughout with SBA x1 throughout all ambulation. Overall increased distances limited by patient's SOB secondary to increased rib pain. Patient's O2 saturation was 88% following ambulation and recovered to > 90% within 45 seconds.   Stairs 1   Rails 1 Right;Left   Device 1 Gait belt   Assistance 1 Standby assistance   Comment/# Steps 1 12; Patient demonstrates reciprocal pattern on ascent and descent with single hand rail (utilized right hand rail on ascent and left hand rail on descent). No unsteadiness nor LOB observed. Verbal cues for maintaining NWB of LUE throughout.   QI Functional Abilities and Goals: Mobility   Sit to Stand Supervision or touching assistance - 04   Chair/Bed-to-Chair Transfer Supervision or touching assistance - 04   Does the Patient Walk? 2   Walk 10 Feet Supervision or touching assistance - 04   Walk 50 Feet with Two Turns Supervision or touching assistance - 04   Walk 150 Feet Supervision or touching assistance - 04   1 Step (Curb) Supervision or touching assistance - 04   4 Steps  Supervision or touching assistance - 04   12 Steps Supervision or touching assistance - 04   Standing   Standing-Exercises Lower extremity   Standing-Exercise Comments Patient completed marches, mini squats and hip abduction each for 2 sets of 10 with BLEs and no additional UE support throughout with SBA/supervision. No unsteadiness nor LOB observed. Verbal cues on occasional for maintaining NWB of LUE as well as proper technique to optimize strengthening.   Activity Tolerance   Activity Tolerance Comments Patient demonstrates fair tolerance to all interventions throughout PT session. Increased therapeutic rest breaks required secondary to increased SOB. O2 saturation assessed throughout PT session, lowest measured amount was 88% on room air and recovered to above 90% within 45 seconds.         _________________  Tommy Perrin, PT  07/08/22 12:10 PM

## 2022-07-08 NOTE — NURSING END OF SHIFT
Nursing End of Shift Summary:    Patient: Ralph Giron  MRN: [id number]  : 1950, Age: 72 y.o.    Location: [place] [address]    Nursing Goals  Stay safe, pain control   Narrative Summary of Progress Toward Clinical Goals:  Pt safe and ayana controlled with use of oxycodone and tylenol and ice packs.  Pt made independent by therapy today .      Barriers to Goals/Nursing Concerns:  No    New Patient or Family Concerns/Issues:  No    Shift Summary:   Significant Events & Communications to Providers (last 12 hours)     Last 5 Values    No documentation.             Oxygen Usage (last 12 hours)     Last 5 Values    No documentation.             Mobility (last 12 hours)     Last 5 Values     Row Name 22 0700 22 1000                Mobility    Activity -- Bathroom privileges       Patient Position Other (Comment) --       Turning -- Turns self                 Urethral Catheter     Active Urethral Catheter     None            Active Lines     Active Central venous catheter / Peripherally inserted central catheter / Implantable Port / Hemodialysis catheter / Midline Catheter     None              Infusing Medications   Medication Dose Last Rate     PRN Medications   Medication Dose Last Admin   • acetaminophen  325-650 mg 650 mg at 22 1156   • albuterol  2.5 mg     • alum-mag hydroxide-simeth  30 mL 30 mL at 22 0420   • benzonatate  200 mg     • cyclobenzaprine  10 mg     • oxyCODONE  5-10 mg 5 mg at 22 1156   • magnesium hydroxide  30 mL     • bisacodyL  10 mg       _________________________  Kati Angel RN  22 5:33 PM

## 2022-07-08 NOTE — PLAN OF CARE
Problem: Pain - Adult  Goal: Verbalizes/displays adequate comfort level or baseline comfort level  Description: INTERVENTIONS:  1. Encourage patient to monitor pain and request interventions  2. Assess pain using the appropriate pain scale  3. Administer analgesics based on type and severity of pain and evaluate response  4. Educate/Implement non-pharmacological measures as appropriate and evaluate response  5. Consider cultural, developmental and social influences on pain and pain management  6. Notify Provider if interventions unsuccessful or patient reports new pain  Outcome: Progressing  Flowsheets (Taken 7/8/2022 0438)  Verbalizes/displays adequate comfort level or baseline comfort level:   Encourage patient to monitor pain and request interventions   Assess pain using the appropriate pain scale     Problem: Infection - Adult  Goal: Absence of infection during hospitalization  Description: INTERVENTIONS:  1. Assess and monitor for signs and symptoms of infection  2. Monitor lab/diagnostic results  3. Monitor all insertion sites/wounds/incisions  4. Monitor secretions for changes in amount and color  5. Administer medications as ordered  6. Educate and encourage patient and family to use good hand hygiene technique  7. Identify and educate in appropriate isolation precautions for identified infection/condition  Outcome: Progressing  Flowsheets (Taken 7/8/2022 0438)  Absence of infection during hospitalization:   Assess and monitor for signs and symptoms of infection   Monitor lab/diagnostic results     Problem: Safety Adult - Fall  Goal: Free from fall injury  Description: INTERVENTIONS:    Inpatient - Please reference Cares/Safety Flowsheet under [facility] Fall Risk for interventions.  Pediatrics - Please reference Peds Daily Cares/Safety Flowsheet under [facility] Pediatric Fall Assessment Fall Bundle for interventions  LD/OB - Please reference OB Shift Screening Flowsheet under OB Fall Risk for  interventions.  Outcome: Progressing     Problem: Respiratory - Adult  Goal: Achieves optimal ventilation and oxygenation with noninvasive CPAP/BiLEVEL support  Description: INTERVENTIONS:  1. Provide education to patient/family about rationale and expected outcomes associated with therapy  2. Position patient to facilitate optimal ventilation/oxygenation status and minimize respiratory effort  3. Position patient to reduce aspiration risk, elevate head of bed at least 35 degrees or higher, as applicable  4. Assess effectiveness of therapy on ventilation/oxygenation status based on oxygen saturation and/or arterial blood gases, as indicated  5. Assess patient for changes in respiratory and physiological status  6. Auscultate breath sounds and assess chest excursion, as indicated  7. Assess patient for changes in mentation and behavior  8. Routinely monitor equipment for proper performance and settings  9. Assess and monitor skin condition, in relationship to the respiratory interface  10. Assure equipment alarm volume is adequate for the patient's environment  11. Immediately respond to equipment alarm, to assess patient and/or cause for alarm  12. Follow universal infection control/hospital policy(ies)/standards  Outcome: Progressing  Flowsheets (Taken 7/7/2022 0048)  Achieves Optimal Oxygenation with Noninvasive CPAP/BiLEVEL Support:   Provide education to patient/family about rationale and expected outcomes associated with therapy   Position patient to facilitate optimal ventilation/oxygenation status and minimize respiratory effort   Assess patient for changes in respiratory and physiological status   Auscultate breath sounds and assess chest excursion, as indicated   Assess patient for changes in mentation and behavior   Routinely monitor equipment for proper performance and settings   Assess and monitor skin condition, in relationship to the respiratory interface   Follow universal infection control/hospital  policy(ies)/standards     Problem: Skin/Tissue Integrity - Adult  Goal: Skin integrity remains intact  Description: INTERVENTIONS  1. Assess and document risk factors for pressure ulcer development  2. Assess and document skin integrity  3. Monitor for areas of redness and/or skin breakdown  4. Initiate pressure ulcer prevention measures as indicated  Outcome: Progressing  Flowsheets (Taken 7/8/2022 0438)  Skin integrity remains intact:   Assess and document risk factors for pressure ulcer development   Monitor for areas of redness and/or skin breakdown   Assess and document skin integrity     Problem: Musculoskeletal - Adult  Goal: Return mobility to safest level of function  Description: INTERVENTIONS:  1. Assess patient stability and activity tolerance for standing, transferring and ambulating w/ or w/o assistive devices  2. Assist with transfers and ambulation using safe practices  3. Ensure adequate protection for wounds/incisions during mobilization  4. Obtain PT/OT and other consults as needed  5. Apply Continuous Passive Motion per order to increase flexion toward goal  6. Instruct patient/family in ordered activity level  Outcome: Progressing  Flowsheets (Taken 7/8/2022 0438)  Return mobility to safest level of function:   Assist with transfers and ambulation using safe practices   Assess patient stability and activity tolerance for standing, transferring and ambulating with or without assistive devices     See updated Inpt Rehab Nursing Plan of Care Statement.
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
## 2022-07-08 NOTE — INTERDISCIPLINARY/THERAPY
Occupational Therapy  Treatment Note (OT)      Patient Name: Ralph Giron  Age: 72 y.o.  Gender: male    ----------------------------------------------------------------------------------------------------------------------       07/08/22 1315   Time Calculation   Start Time 1315   Stop Time 1400   Time Calculation (min) 45 min   OT Last Visit   OT Received On 07/08/22   General   Chart Reviewed Yes   OT Treatment Duration (Min) 45 Minutes   Precautions   Reinforced Precautions Yes   Subjective Comments   RN Stated patient is medically cleared for therapy Yes   Subjective Comments Pt reporting pain is managed. PT ok'd pt to go without cane. Discussed (I) in room and spoke with nsg at end of session about (I) in room, no AD daytime and nighttime.   Transfer 1   Trials/Comments 1 STS SBA, no AD   Ambulation 1   Comments 1 Ambulated x 40 m, no AD with supervision   Toileting   Toileting Comments Assessed (I) in room with toileting, opening/closing door, etc- pt demonstrated good safety   OT Therapeutic Groups   UE Therapeutic Exercise Completed SciFit Level 2 with RUE only x 3:00 mins x 3 sets in both directions working on aerobic endurance at moderate pace   UE Therapeutic Activity Engaged in Dynavision Mode A for dynamic balance and endurance. Used RUE, score in 2:00 mins: 68 and then 85   Assessment   Rehab Potential Good   Progress Progressing toward goals   Recommendations for Therapy Continue skilled therapy   Assessment Comment Pt has progressed to (I) in room, supervision still for bathing. Did demo increased HR and dypsnea with SciFit, good response to exercise and tolerated without increase in pain.   Therapeutic Interventions (Time Spent in Minutes)   Therapeutic Exercise 15   Therapeutic Activity Dynamic 15   ADL Selfcare Home Managment 15   Plan   Plan Comment Shower         _________________  Jaymie Tran, OTR  07/08/22 2:21 PM

## 2022-07-08 NOTE — NURSING END OF SHIFT
Nursing End of Shift Summary:    Patient: Ralph Giron  MRN: [id number]  : 1950, Age: 72 y.o.    Location: [place] [address]    Nursing Goals  Clinical Goals for the Shift: Nursing will monitor for increasing pain and offer PRN interventions as appropriate.  Pt will remain free from falls.    Narrative Summary of Progress Toward Clinical Goals:  PRN Tylenol, Paige, and cold packs in use this shift to pain to L chest/ribs.  Pt demonstrated appropriate call light use and remained free from falls. Pt slept with CPAP this night.     Barriers to Goals/Nursing Concerns:  Note left for provider requesting order clarification of MIKEL Lipitor as 2 doses currently ordered.     New Patient or Family Concerns/Issues:  L rib pain. PRN Tylenol, Paige, and cold packs in use.     Shift Summary:   Significant Events & Communications to Providers (last 12 hours)     Last 5 Values     Row Name 22                   Provider Notification    Reason for Communication Other (Comment)  Currently has x 2 orders for Lipitor, 10mg and 20mg doses.  Requested D/C of 20mg dose.  -CA        Provider Name Dr.Stacy MEJIA              User Key  (r) = Recorded By, (t) = Taken By, (c) = Cosigned By    Initials Name    CA Corinne J Ader, LUIS            Oxygen Usage (last 12 hours)     Last 5 Values     Row Name 22                   Oxygen Weaning Trial by Nursing    Is Patient on Room Air OR on the Same Amount of O2 as at Home? Yes                Mobility (last 12 hours)     Last 5 Values     Row Name 22                   Mobility    Level of Assistance Contact guard assist, steadying assist        Patient Position Supine        Turning Turns self                  Urethral Catheter     Active Urethral Catheter     None            Active Lines     Active Central venous catheter / Peripherally inserted central catheter / Implantable Port / Hemodialysis catheter / Midline Catheter     None              Infusing Medications    Medication Dose Last Rate     PRN Medications   Medication Dose Last Admin   • acetaminophen  325-650 mg 650 mg at 07/07/22 2033   • albuterol  2.5 mg     • alum-mag hydroxide-simeth  30 mL 30 mL at 07/07/22 0420   • benzonatate  200 mg     • cyclobenzaprine  10 mg     • oxyCODONE  5-10 mg 5 mg at 07/07/22 2159   • magnesium hydroxide  30 mL     • bisacodyL  10 mg       _________________________  Corinne J Ader, RN  07/08/22 4:40 AM
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
## 2022-07-08 NOTE — INTERDISCIPLINARY/THERAPY
Physical Therapy  Treatment Note (PT)    Patient Name: Ralph Giron  Age: 72 y.o.  Gender: male    ----------------------------------------------------------------------------------------------------------------------       07/08/22 1230   Time Calculation   Start Time 1230   Stop Time 1315   Time Calculation (min) 45 min   Pain Assessment Scale   Pain Scale 0-10   0-10 Pain Score 2   Has Pain Adversely Affected Your Usual Activity, Sleep, Mood or Stress in the Last 24 Hours? Yes   How Has Pain Adversely Affected You? Decreased mobility   Pain Type Acute pain   Pain Location Rib cage   Pain Orientation Left;Posterior   Pain Descriptors Aching   Pain Frequency Constant/continuous   Precautions   Reinforced Precautions Yes   Weight Bearing Precautions Yes   LUE Weight Bearing Non Weight Bearing (NWB)  (LUE)   Subjective Comments   RN Stated patient is medically cleared for therapy Yes   Subjective Comments Patient reported overall decrease in pain in the left ribs throughout PT session with no increase in pain throughout. No other complaints reported throughout PT session. Therapeutic rest breaks required on occasion secondary to continued SOB with various interventions and functional tasks. O2 saturation assessed throughout PT session. Lowest O2 saturation was 87% on room air, recovered to above 90% within 60 seconds.   Transfer 1   Level of Assistance 1 General supervision   Trials/Comments 1 Sit<>stand transfers from recliner and to/from chairs with arm rests throughout PT session with supervision. No unsteadiness nor LOB observed. Verbal cues x3 occasions for maintaining NWB of LUE as patient did not have LUE sling donned prior to, throughout and following PT session.   Weight Bearing Precautions   LUE Weight Bearing Non Weight Bearing (NWB)   Ambulation 1   Surface 1 Level surface;Smooth   Device 1 No device;Gait belt;SBQC   Assistance 1 Standby assistance   Quality of Gait 1 Continues to demonstrated decreased  alivia/gait speed throughout. Continued SOB present with therapeutic rest breaks following ambulation to improve overall tolerance. O2 saturation assessed following gait training and at 87% on room air following, recovered to 90% within 60 seconds.   Comments 1 Ambulated 50 meters x2 with SBQC and 75 meters x1 with no AD and SBA x1 throughout. No unsteadiness nor LOB observed.   Standing   Standing-Exercises Specific exercises   Standing-Exercise Comments Completed heel raises and hip extension for 3 sets of 10 with BLEs and single UE support on parallel bars with supervision throughout. Occasional standing/seated rest breaks for improved tolerance to all therapeutic interventions. No unsteadiness nor LOB observed. Verbal cues on occasion for proper technique to optimize strengthening and for maintaining NWB of LUE.   Equipment Use   Recumbent Stepper Patient completed 10 minutes on the recumbent stepping (including seated therapeutic rest breaks) at level 1 resistance averaging 82 steps/minute. Occasional seated rest breaks to assess and monitor patient's O2 saturation throughout. Lowest O2 saturation was 88% on room air and recovered to 90% within 45 seconds, patient was able to maintain 90% on room air throughout majority of therapeutic intervention.   Activity Tolerance   Activity Tolerance Comments Patient demonstrates fair tolerance to all interventions throughout PT session. Increased therapeutic rest breaks required secondary to increased SOB. O2 saturation assessed throughout PT session, lowest measured amount was 87% on room air and recovered to above 90% within 60 seconds.   Therapeutic Interventions (Time Spent in Minutes)   Gait/Mobility 15   Therapeutic Activity 5   Therapeutic Exercise 25         _________________  Tommy Perrin, PT  07/08/22 1:39 PM

## 2022-07-08 NOTE — PLAN OF CARE
Problem: Pain - Adult  Goal: Verbalizes/displays adequate comfort level or baseline comfort level  Description: INTERVENTIONS:  1. Encourage patient to monitor pain and request interventions  2. Assess pain using the appropriate pain scale  3. Administer analgesics based on type and severity of pain and evaluate response  4. Educate/Implement non-pharmacological measures as appropriate and evaluate response  5. Consider cultural, developmental and social influences on pain and pain management  6. Notify Provider if interventions unsuccessful or patient reports new pain  Outcome: Progressing     Problem: Infection - Adult  Goal: Absence of infection during hospitalization  Description: INTERVENTIONS:  1. Assess and monitor for signs and symptoms of infection  2. Monitor lab/diagnostic results  3. Monitor all insertion sites/wounds/incisions  4. Monitor secretions for changes in amount and color  5. Administer medications as ordered  6. Educate and encourage patient and family to use good hand hygiene technique  7. Identify and educate in appropriate isolation precautions for identified infection/condition  Outcome: Progressing     Problem: Safety Adult - Fall  Goal: Free from fall injury  Description: INTERVENTIONS:    Inpatient - Please reference Cares/Safety Flowsheet under [facility] Fall Risk for interventions.  Pediatrics - Please reference Peds Daily Cares/Safety Flowsheet under [facility] Pediatric Fall Assessment Fall Bundle for interventions  LD/OB - Please reference OB Shift Screening Flowsheet under OB Fall Risk for interventions.  Outcome: Progressing     Problem: Discharge Planning  Goal: Discharge to home or other facility with appropriate resources  Description: INTERVENTIONS:  1. Identify and discuss barriers to discharge with patient and caregiver.  2. Arrange for needed discharge resources and transportation as appropriate.  3. Identify discharge learning needs (meds, wound care, etc).  4. Arrange for  interpreters to assist at discharge as needed.  5. Refer to  for coordinating discharge planning if the patient needs post-hospital services based on physician order or complex needs related to functional status, cognitive ability or social support system.  Outcome: Progressing     Problem: Cardiovascular - Adult  Goal: Maintains optimal cardiac output and hemodynamic stability  Description: INTERVENTIONS:  1. Monitor vital signs and rhythm  2. Monitor for hypotension and other signs of decreased cardiac output  3. Administer and titrate ordered vasoactive medications to optimize hemodynamic stability  4. Monitor for fluid overload/dehydration, weight gain, shortness of breath and activity intolerance  5. Monitor arterial and/or venous puncture sites for bleeding and/or hematoma  6. Assess quality of pulses, capillary refill, edema, sensation, skin color and temperature  7. Assess for signs of decreased coronary artery perfusion - ex. angina  Outcome: Progressing  Goal: Absence of cardiac dysrhythmias or at baseline  Description: INTERVENTIONS:  1. Continuous cardiac monitoring, monitor vital signs, obtain 12 lead EKG if indicated  2. Administer antiarrhythmic and heart rate control medications as ordered  3. Initiate emergency measures for life threatening arrhythmias  4. Monitor electrolytes and administer replacement therapy as ordered  Outcome: Progressing     Problem: Respiratory - Adult  Goal: Achieves optimal ventilation and oxygenation  Description: INTERVENTIONS:  1. Assess for changes in respiratory status  2. Assess for changes in mentation and behavior  3. Position to facilitate oxygenation and minimize respiratory effort  4. Oxygen supplementation based on oxygen saturation or ABGs  5. Assess patient's ability to cough effectively  6. Encourage broncho-pulmonary hygiene including cough, deep breathe  7. Assess the need for suctioning   8. Assess and instruct to report SOB or any  respiratory difficulty  9. Respiratory Therapy support as indicated, including medications and treatment.  Outcome: Progressing  Goal: Achieves optimal ventilation and oxygenation with noninvasive CPAP/BiLEVEL support  Description: INTERVENTIONS:  1. Provide education to patient/family about rationale and expected outcomes associated with therapy  2. Position patient to facilitate optimal ventilation/oxygenation status and minimize respiratory effort  3. Position patient to reduce aspiration risk, elevate head of bed at least 35 degrees or higher, as applicable  4. Assess effectiveness of therapy on ventilation/oxygenation status based on oxygen saturation and/or arterial blood gases, as indicated  5. Assess patient for changes in respiratory and physiological status  6. Auscultate breath sounds and assess chest excursion, as indicated  7. Assess patient for changes in mentation and behavior  8. Routinely monitor equipment for proper performance and settings  9. Assess and monitor skin condition, in relationship to the respiratory interface  10. Assure equipment alarm volume is adequate for the patient's environment  11. Immediately respond to equipment alarm, to assess patient and/or cause for alarm  12. Follow universal infection control/hospital policy(ies)/standards  Outcome: Progressing     Problem: Skin/Tissue Integrity - Adult  Goal: Skin integrity remains intact  Description: INTERVENTIONS  1. Assess and document risk factors for pressure ulcer development  2. Assess and document skin integrity  3. Monitor for areas of redness and/or skin breakdown  4. Initiate pressure ulcer prevention measures as indicated  Outcome: Progressing  Goal: Incisions, wounds, or drain sites healing without S/S of infection  Description: INTERVENTIONS  1. Assess and document risk factors for skin breakdown  2. Assess and document skin integrity  3. Assess and document dressing/incision, wound bed, drain sites and surrounding  tissue  4. Implement wound care per orders  Outcome: Progressing  Goal: Oral mucous membranes remain intact  Description: INTERVENTIONS  1. Assess oral mucosa and hygiene practices  2. Implement preventative oral hygiene regimen  3. Implement oral medicated treatments as ordered  Outcome: Progressing     Problem: Musculoskeletal - Adult  Goal: Return mobility to safest level of function  Description: INTERVENTIONS:  1. Assess patient stability and activity tolerance for standing, transferring and ambulating w/ or w/o assistive devices  2. Assist with transfers and ambulation using safe practices  3. Ensure adequate protection for wounds/incisions during mobilization  4. Obtain PT/OT and other consults as needed  5. Apply Continuous Passive Motion per order to increase flexion toward goal  6. Instruct patient/family in ordered activity level  Outcome: Progressing  Goal: Maintain proper alignment of affected body part  Description: INTERVENTIONS:  1. Support and protect limb and body alignment per provider order  2. Instruct and reinforce with patient and family use of appropriate assistive device and precautions (e.g. spinal or hip dislocation precautions)  Outcome: Progressing  Goal: Return ADL status to a safe level of function  Description: INTERVENTIONS:  1. Assess patient's ADL deficits and provide assistive devices as needed  2. Obtain PT/OT consults as needed  3. Assist and instruct patient to increase activity and self care  Outcome: Progressing

## 2022-07-09 PROCEDURE — 97110 THERAPEUTIC EXERCISES: CPT | Mod: GO

## 2022-07-09 PROCEDURE — (BLANK) HC ROOM PRIVATE REHAB FACILITY

## 2022-07-09 PROCEDURE — 6370000100 HC RX 637 (ALT 250 FOR IP): Performed by: INTERNAL MEDICINE

## 2022-07-09 PROCEDURE — 97530 THERAPEUTIC ACTIVITIES: CPT | Mod: GP,CQ

## 2022-07-09 PROCEDURE — 99232 SBSQ HOSP IP/OBS MODERATE 35: CPT | Performed by: INTERNAL MEDICINE

## 2022-07-09 PROCEDURE — 97535 SELF CARE MNGMENT TRAINING: CPT | Mod: GO

## 2022-07-09 PROCEDURE — 6370000100 HC RX 637 (ALT 250 FOR IP): Performed by: PHYSICAL MEDICINE & REHABILITATION

## 2022-07-09 PROCEDURE — 97116 GAIT TRAINING THERAPY: CPT | Mod: GP,CQ

## 2022-07-09 PROCEDURE — 97110 THERAPEUTIC EXERCISES: CPT | Mod: GP,CQ

## 2022-07-09 PROCEDURE — 6360000200 HC RX 636 W HCPCS (ALT 250 FOR IP): Performed by: PHYSICAL MEDICINE & REHABILITATION

## 2022-07-09 RX ADMIN — CYCLOBENZAPRINE 10 MG: 10 TABLET, FILM COATED ORAL at 21:00

## 2022-07-09 RX ADMIN — CYANOCOBALAMIN TAB 1000 MCG 1000 MCG: 1000 TAB at 08:04

## 2022-07-09 RX ADMIN — FLUTICASONE PROPIONATE 2 SPRAY: 50 SPRAY, METERED NASAL at 08:06

## 2022-07-09 RX ADMIN — PRAZOSIN HYDROCHLORIDE 6 MG: 1 CAPSULE ORAL at 21:00

## 2022-07-09 RX ADMIN — OXYCODONE HYDROCHLORIDE 5 MG: 5 TABLET ORAL at 15:02

## 2022-07-09 RX ADMIN — DICYCLOMINE HYDROCHLORIDE 20 MG: 10 CAPSULE ORAL at 15:02

## 2022-07-09 RX ADMIN — TRAZODONE HYDROCHLORIDE 100 MG: 100 TABLET ORAL at 21:00

## 2022-07-09 RX ADMIN — OXYCODONE HYDROCHLORIDE 10 MG: 5 TABLET ORAL at 21:00

## 2022-07-09 RX ADMIN — OXYCODONE HYDROCHLORIDE 5 MG: 5 TABLET ORAL at 10:46

## 2022-07-09 RX ADMIN — DICYCLOMINE HYDROCHLORIDE 20 MG: 10 CAPSULE ORAL at 08:03

## 2022-07-09 RX ADMIN — ATENOLOL 50 MG: 50 TABLET ORAL at 08:04

## 2022-07-09 RX ADMIN — ENOXAPARIN SODIUM 40 MG: 100 INJECTION SUBCUTANEOUS at 15:02

## 2022-07-09 RX ADMIN — LIDOCAINE 2 PATCH: 50 PATCH TOPICAL at 08:00

## 2022-07-09 RX ADMIN — DICYCLOMINE HYDROCHLORIDE 20 MG: 10 CAPSULE ORAL at 21:00

## 2022-07-09 RX ADMIN — AMLODIPINE BESYLATE 5 MG: 5 TABLET ORAL at 08:04

## 2022-07-09 RX ADMIN — LISINOPRIL 40 MG: 20 TABLET ORAL at 08:04

## 2022-07-09 RX ADMIN — ATORVASTATIN CALCIUM 20 MG: 20 TABLET, FILM COATED ORAL at 21:00

## 2022-07-09 RX ADMIN — LANSOPRAZOLE 30 MG: 30 CAPSULE, DELAYED RELEASE ORAL at 08:03

## 2022-07-09 RX ADMIN — VENLAFAXINE HYDROCHLORIDE 225 MG: 150 CAPSULE, EXTENDED RELEASE ORAL at 08:04

## 2022-07-09 RX ADMIN — OXYCODONE HYDROCHLORIDE 5 MG: 5 TABLET ORAL at 08:04

## 2022-07-09 RX ADMIN — BUPROPION HYDROCHLORIDE 150 MG: 150 TABLET, EXTENDED RELEASE ORAL at 08:04

## 2022-07-09 NOTE — INTERDISCIPLINARY/THERAPY
Occupational Therapy  Treatment Note (OT)      Patient Name: Ralph Giron  Age: 72 y.o.  Gender: male    ----------------------------------------------------------------------------------------------------------------------     07/09/22 1045   Time Calculation   Start Time 1045   Stop Time 1130   Time Calculation (min) 45 min   OT Last Visit   OT Received On 07/09/22   General   Chart Reviewed Yes   OT Treatment Duration (Min) 45 Minutes   Precautions   Reinforced Precautions Yes   Subjective Comments   RN Stated patient is medically cleared for therapy Yes   Subjective Comments Pt did have an increase in pain to (L)UE, rating 7/10 upon arrival. RN administered pain meds. It was discussed about staying on top of pain. Pt declined any activities in gym due to the pain.   Transfers 2   Trials/Comments 2 Discussed car xfer- declined trialing due to pain. Pt has lower sitting vehicle. Discussed xfer techniques and sitting down on seat vs. stepping in to help with balance and having wife assist as needed. Pt reporting he has no concerns with car xfer at d/c.   QI Functional Abilities and Goals: Mobility   Car Transfers Patient refused - 07   OT Therapeutic Groups   Patient Education Issued RUE HEP with green and blue t-bands. Reviewed all exercises and provided written home program to LUE only used to stabilize band. Educated on technique and form and provided exercise prescription for frequency. May benefit from a review prior to d/c to ensure competency.   Additional Activities   Additional Activities Comments Found in ortho notes when pt can no longer be NSB (6 weeks). Pt will be in [facility] at that time. Educated on exercises pt can do to start advancing ROM in LUE.   Assessment   Assessment Comment Pt limited in session due to pain in LUE. Was able to issue RUE HEP and will review prior to d/c. Educated pt on NWB restrictions and how to advance LUE ROM after 6 weeks.   Therapeutic Interventions (Time Spent in  Minutes)   Therapeutic Exercise 30   ADL Selfcare Home Managment 15   Plan   Plan Comment ADLs, UE endurance, dynamic balance         _________________  Jaymie Tran, OTR  07/09/22 12:16 PM

## 2022-07-09 NOTE — NURSING END OF SHIFT
Nursing End of Shift Summary:    Patient: Ralph Giron  MRN: [id number]  : 1950, Age: 72 y.o.    Location: [place] [address]    Nursing Goals  Clinical Goals for the Shift: Nursing will monitor for increasing pain and offer PRN interventions as appropriate.  Pt will remain free from falls.    Narrative Summary of Progress Toward Clinical Goals:  Pt remained free from falls this shift. PRN Paige and Flexeril in use x 1 this shift for c/o L ribcage pain. Pt uses call light appropriately to make needs known. Pt deemed independent in room with no AD.    Barriers to Goals/Nursing Concerns:  No    New Patient or Family Concerns/Issues:  No    Shift Summary:   Significant Events & Communications to Providers (last 12 hours)     Last 5 Values    No documentation.             Oxygen Usage (last 12 hours)     Last 5 Values    No documentation.             Mobility (last 12 hours)     Last 5 Values     Row Name 22                Mobility    Patient Position Supine --       Turning -- Turns self                 Urethral Catheter     Active Urethral Catheter     None            Active Lines     Active Central venous catheter / Peripherally inserted central catheter / Implantable Port / Hemodialysis catheter / Midline Catheter     None              Infusing Medications   Medication Dose Last Rate     PRN Medications   Medication Dose Last Admin   • acetaminophen  325-650 mg 650 mg at 22 1156   • albuterol  2.5 mg     • alum-mag hydroxide-simeth  30 mL 30 mL at 22 0420   • benzonatate  200 mg     • cyclobenzaprine  10 mg 10 mg at 22   • oxyCODONE  5-10 mg 10 mg at 22   • magnesium hydroxide  30 mL     • bisacodyL  10 mg       _________________________  Freedom Castro RN  22 6:42 AM
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
## 2022-07-09 NOTE — PLAN OF CARE
Problem: Pain - Adult  Goal: Verbalizes/displays adequate comfort level or baseline comfort level  Description: INTERVENTIONS:  1. Encourage patient to monitor pain and request interventions  2. Assess pain using the appropriate pain scale  3. Administer analgesics based on type and severity of pain and evaluate response  4. Educate/Implement non-pharmacological measures as appropriate and evaluate response  5. Consider cultural, developmental and social influences on pain and pain management  6. Notify Provider if interventions unsuccessful or patient reports new pain  Outcome: Progressing     Problem: Infection - Adult  Goal: Absence of infection during hospitalization  Description: INTERVENTIONS:  1. Assess and monitor for signs and symptoms of infection  2. Monitor lab/diagnostic results  3. Monitor all insertion sites/wounds/incisions  4. Monitor secretions for changes in amount and color  5. Administer medications as ordered  6. Educate and encourage patient and family to use good hand hygiene technique  7. Identify and educate in appropriate isolation precautions for identified infection/condition  Outcome: Progressing     Problem: Safety Adult - Fall  Goal: Free from fall injury  Description: INTERVENTIONS:    Inpatient - Please reference Cares/Safety Flowsheet under [facility] Fall Risk for interventions.  Pediatrics - Please reference Peds Daily Cares/Safety Flowsheet under [facility] Pediatric Fall Assessment Fall Bundle for interventions  LD/OB - Please reference OB Shift Screening Flowsheet under OB Fall Risk for interventions.  Outcome: Progressing     Problem: Cardiovascular - Adult  Goal: Maintains optimal cardiac output and hemodynamic stability  Description: INTERVENTIONS:  1. Monitor vital signs and rhythm  2. Monitor for hypotension and other signs of decreased cardiac output  3. Administer and titrate ordered vasoactive medications to optimize hemodynamic stability  4. Monitor for fluid  overload/dehydration, weight gain, shortness of breath and activity intolerance  5. Monitor arterial and/or venous puncture sites for bleeding and/or hematoma  6. Assess quality of pulses, capillary refill, edema, sensation, skin color and temperature  7. Assess for signs of decreased coronary artery perfusion - ex. angina  Outcome: Progressing  Goal: Absence of cardiac dysrhythmias or at baseline  Description: INTERVENTIONS:  1. Continuous cardiac monitoring, monitor vital signs, obtain 12 lead EKG if indicated  2. Administer antiarrhythmic and heart rate control medications as ordered  3. Initiate emergency measures for life threatening arrhythmias  4. Monitor electrolytes and administer replacement therapy as ordered  Outcome: Progressing     Problem: Respiratory - Adult  Goal: Achieves optimal ventilation and oxygenation  Description: INTERVENTIONS:  1. Assess for changes in respiratory status  2. Assess for changes in mentation and behavior  3. Position to facilitate oxygenation and minimize respiratory effort  4. Oxygen supplementation based on oxygen saturation or ABGs  5. Assess patient's ability to cough effectively  6. Encourage broncho-pulmonary hygiene including cough, deep breathe  7. Assess the need for suctioning   8. Assess and instruct to report SOB or any respiratory difficulty  9. Respiratory Therapy support as indicated, including medications and treatment.  Outcome: Progressing  Goal: Achieves optimal ventilation and oxygenation with noninvasive CPAP/BiLEVEL support  Description: INTERVENTIONS:  1. Provide education to patient/family about rationale and expected outcomes associated with therapy  2. Position patient to facilitate optimal ventilation/oxygenation status and minimize respiratory effort  3. Position patient to reduce aspiration risk, elevate head of bed at least 35 degrees or higher, as applicable  4. Assess effectiveness of therapy on ventilation/oxygenation status based on oxygen  saturation and/or arterial blood gases, as indicated  5. Assess patient for changes in respiratory and physiological status  6. Auscultate breath sounds and assess chest excursion, as indicated  7. Assess patient for changes in mentation and behavior  8. Routinely monitor equipment for proper performance and settings  9. Assess and monitor skin condition, in relationship to the respiratory interface  10. Assure equipment alarm volume is adequate for the patient's environment  11. Immediately respond to equipment alarm, to assess patient and/or cause for alarm  12. Follow universal infection control/hospital policy(ies)/standards  Outcome: Progressing     Problem: Skin/Tissue Integrity - Adult  Goal: Skin integrity remains intact  Description: INTERVENTIONS  1. Assess and document risk factors for pressure ulcer development  2. Assess and document skin integrity  3. Monitor for areas of redness and/or skin breakdown  4. Initiate pressure ulcer prevention measures as indicated  Outcome: Progressing  Goal: Incisions, wounds, or drain sites healing without S/S of infection  Description: INTERVENTIONS  1. Assess and document risk factors for skin breakdown  2. Assess and document skin integrity  3. Assess and document dressing/incision, wound bed, drain sites and surrounding tissue  4. Implement wound care per orders  Outcome: Progressing     Problem: Musculoskeletal - Adult  Goal: Return mobility to safest level of function  Description: INTERVENTIONS:  1. Assess patient stability and activity tolerance for standing, transferring and ambulating w/ or w/o assistive devices  2. Assist with transfers and ambulation using safe practices  3. Ensure adequate protection for wounds/incisions during mobilization  4. Obtain PT/OT and other consults as needed  5. Apply Continuous Passive Motion per order to increase flexion toward goal  6. Instruct patient/family in ordered activity level  Outcome: Progressing  Goal: Maintain  proper alignment of affected body part  Description: INTERVENTIONS:  1. Support and protect limb and body alignment per provider order  2. Instruct and reinforce with patient and family use of appropriate assistive device and precautions (e.g. spinal or hip dislocation precautions)  Outcome: Progressing  Goal: Return ADL status to a safe level of function  Description: INTERVENTIONS:  1. Assess patient's ADL deficits and provide assistive devices as needed  2. Obtain PT/OT consults as needed  3. Assist and instruct patient to increase activity and self care  Outcome: Progressing
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
## 2022-07-10 PROCEDURE — 6370000100 HC RX 637 (ALT 250 FOR IP): Performed by: INTERNAL MEDICINE

## 2022-07-10 PROCEDURE — 6370000100 HC RX 637 (ALT 250 FOR IP): Performed by: PHYSICAL MEDICINE & REHABILITATION

## 2022-07-10 PROCEDURE — 97116 GAIT TRAINING THERAPY: CPT | Mod: GP,CQ

## 2022-07-10 PROCEDURE — 2590000100 HC RX 259: Performed by: PHYSICAL MEDICINE & REHABILITATION

## 2022-07-10 PROCEDURE — 97535 SELF CARE MNGMENT TRAINING: CPT | Mod: GO

## 2022-07-10 PROCEDURE — 97530 THERAPEUTIC ACTIVITIES: CPT | Mod: GP,CQ

## 2022-07-10 PROCEDURE — (BLANK) HC ROOM PRIVATE REHAB FACILITY

## 2022-07-10 PROCEDURE — 97530 THERAPEUTIC ACTIVITIES: CPT | Mod: GO

## 2022-07-10 PROCEDURE — 97112 NEUROMUSCULAR REEDUCATION: CPT | Mod: GP,CQ

## 2022-07-10 PROCEDURE — 6360000200 HC RX 636 W HCPCS (ALT 250 FOR IP): Performed by: PHYSICAL MEDICINE & REHABILITATION

## 2022-07-10 PROCEDURE — 97110 THERAPEUTIC EXERCISES: CPT | Mod: GO

## 2022-07-10 PROCEDURE — 97110 THERAPEUTIC EXERCISES: CPT | Mod: GP,CQ

## 2022-07-10 PROCEDURE — 99232 SBSQ HOSP IP/OBS MODERATE 35: CPT | Performed by: INTERNAL MEDICINE

## 2022-07-10 RX ORDER — CHLORTHALIDONE 25 MG/1
25 TABLET ORAL DAILY
Status: DISCONTINUED | OUTPATIENT
Start: 2022-07-10 | End: 2022-07-12 | Stop reason: HOSPADM

## 2022-07-10 RX ADMIN — ENOXAPARIN SODIUM 40 MG: 100 INJECTION SUBCUTANEOUS at 15:29

## 2022-07-10 RX ADMIN — DICYCLOMINE HYDROCHLORIDE 20 MG: 10 CAPSULE ORAL at 08:22

## 2022-07-10 RX ADMIN — TRAZODONE HYDROCHLORIDE 100 MG: 100 TABLET ORAL at 20:30

## 2022-07-10 RX ADMIN — LANSOPRAZOLE 30 MG: 30 CAPSULE, DELAYED RELEASE ORAL at 08:22

## 2022-07-10 RX ADMIN — AMLODIPINE BESYLATE 5 MG: 5 TABLET ORAL at 08:22

## 2022-07-10 RX ADMIN — LIDOCAINE 2 PATCH: 50 PATCH TOPICAL at 08:21

## 2022-07-10 RX ADMIN — SENNOSIDES AND DOCUSATE SODIUM 1 TABLET: 50; 8.6 TABLET ORAL at 08:22

## 2022-07-10 RX ADMIN — CHLORTHALIDONE 25 MG: 25 TABLET ORAL at 08:22

## 2022-07-10 RX ADMIN — FLUTICASONE PROPIONATE 2 SPRAY: 50 SPRAY, METERED NASAL at 08:27

## 2022-07-10 RX ADMIN — ATENOLOL 50 MG: 50 TABLET ORAL at 08:25

## 2022-07-10 RX ADMIN — OXYCODONE HYDROCHLORIDE 5 MG: 5 TABLET ORAL at 12:30

## 2022-07-10 RX ADMIN — BUPROPION HYDROCHLORIDE 150 MG: 150 TABLET, EXTENDED RELEASE ORAL at 08:21

## 2022-07-10 RX ADMIN — SENNOSIDES AND DOCUSATE SODIUM 1 TABLET: 50; 8.6 TABLET ORAL at 20:29

## 2022-07-10 RX ADMIN — PRAZOSIN HYDROCHLORIDE 6 MG: 1 CAPSULE ORAL at 20:30

## 2022-07-10 RX ADMIN — DICYCLOMINE HYDROCHLORIDE 20 MG: 10 CAPSULE ORAL at 15:28

## 2022-07-10 RX ADMIN — CYANOCOBALAMIN TAB 1000 MCG 1000 MCG: 1000 TAB at 08:22

## 2022-07-10 RX ADMIN — VENLAFAXINE HYDROCHLORIDE 225 MG: 150 CAPSULE, EXTENDED RELEASE ORAL at 08:22

## 2022-07-10 RX ADMIN — ATORVASTATIN CALCIUM 20 MG: 20 TABLET, FILM COATED ORAL at 20:29

## 2022-07-10 RX ADMIN — ACETAMINOPHEN 650 MG: 325 TABLET ORAL at 18:52

## 2022-07-10 RX ADMIN — LISINOPRIL 40 MG: 20 TABLET ORAL at 08:22

## 2022-07-10 RX ADMIN — OXYCODONE HYDROCHLORIDE 5 MG: 5 TABLET ORAL at 06:46

## 2022-07-10 RX ADMIN — DICYCLOMINE HYDROCHLORIDE 20 MG: 10 CAPSULE ORAL at 20:29

## 2022-07-10 NOTE — PLAN OF CARE
Problem: Pain - Adult  Goal: Verbalizes/displays adequate comfort level or baseline comfort level  Description: INTERVENTIONS:  1. Encourage patient to monitor pain and request interventions  2. Assess pain using the appropriate pain scale  3. Administer analgesics based on type and severity of pain and evaluate response  4. Educate/Implement non-pharmacological measures as appropriate and evaluate response  5. Consider cultural, developmental and social influences on pain and pain management  6. Notify Provider if interventions unsuccessful or patient reports new pain  Outcome: Progressing  Flowsheets (Taken 7/8/2022 0438 by Corinne J Ader, RN)  Verbalizes/displays adequate comfort level or baseline comfort level:   Encourage patient to monitor pain and request interventions   Assess pain using the appropriate pain scale     Problem: Infection - Adult  Goal: Absence of infection during hospitalization  Description: INTERVENTIONS:  1. Assess and monitor for signs and symptoms of infection  2. Monitor lab/diagnostic results  3. Monitor all insertion sites/wounds/incisions  4. Monitor secretions for changes in amount and color  5. Administer medications as ordered  6. Educate and encourage patient and family to use good hand hygiene technique  7. Identify and educate in appropriate isolation precautions for identified infection/condition  Outcome: Progressing  Flowsheets (Taken 7/8/2022 0438 by Corinne J Ader, RN)  Absence of infection during hospitalization:   Assess and monitor for signs and symptoms of infection   Monitor lab/diagnostic results     Problem: Safety Adult - Fall  Goal: Free from fall injury  Description: INTERVENTIONS:    Inpatient - Please reference Cares/Safety Flowsheet under [facility] Fall Risk for interventions.  Pediatrics - Please reference Peds Daily Cares/Safety Flowsheet under [facility] Pediatric Fall Assessment Fall Bundle for interventions  LD/OB - Please reference OB Shift Screening  Flowsheet under OB Fall Risk for interventions.  Outcome: Progressing     Problem: Respiratory - Adult  Goal: Achieves optimal ventilation and oxygenation  Description: INTERVENTIONS:  1. Assess for changes in respiratory status  2. Assess for changes in mentation and behavior  3. Position to facilitate oxygenation and minimize respiratory effort  4. Oxygen supplementation based on oxygen saturation or ABGs  5. Assess patient's ability to cough effectively  6. Encourage broncho-pulmonary hygiene including cough, deep breathe  7. Assess the need for suctioning   8. Assess and instruct to report SOB or any respiratory difficulty  9. Respiratory Therapy support as indicated, including medications and treatment.  Outcome: Progressing  Flowsheets (Taken 7/6/2022 1839 by Sujey Gray RN)  Achieves optimal ventilation and oxygenation:   Assess for changes in respiratory status   Assess for changes in mentation and behavior   Position to facilitate oxygenation and minimize respiratory effort   Oxygen supplementation based on oxygen saturation or arterial blood gases   Assess patient's ability to cough effectively   Encourage broncho-pulmonary hygiene including cough, deep breathe   Assess the need for suctioning   Assess and instruct to report shortness of breath or any respiratory difficulty  Goal: Achieves optimal ventilation and oxygenation with noninvasive CPAP/BiLEVEL support  Description: INTERVENTIONS:  1. Provide education to patient/family about rationale and expected outcomes associated with therapy  2. Position patient to facilitate optimal ventilation/oxygenation status and minimize respiratory effort  3. Position patient to reduce aspiration risk, elevate head of bed at least 35 degrees or higher, as applicable  4. Assess effectiveness of therapy on ventilation/oxygenation status based on oxygen saturation and/or arterial blood gases, as indicated  5. Assess patient for changes in respiratory and  physiological status  6. Auscultate breath sounds and assess chest excursion, as indicated  7. Assess patient for changes in mentation and behavior  8. Routinely monitor equipment for proper performance and settings  9. Assess and monitor skin condition, in relationship to the respiratory interface  10. Assure equipment alarm volume is adequate for the patient's environment  11. Immediately respond to equipment alarm, to assess patient and/or cause for alarm  12. Follow universal infection control/hospital policy(ies)/standards  Outcome: Progressing  Flowsheets (Taken 7/7/2022 0048 by Corinne J Ader, RN)  Achieves Optimal Oxygenation with Noninvasive CPAP/BiLEVEL Support:   Provide education to patient/family about rationale and expected outcomes associated with therapy   Position patient to facilitate optimal ventilation/oxygenation status and minimize respiratory effort   Assess patient for changes in respiratory and physiological status   Auscultate breath sounds and assess chest excursion, as indicated   Assess patient for changes in mentation and behavior   Routinely monitor equipment for proper performance and settings   Assess and monitor skin condition, in relationship to the respiratory interface   Follow universal infection control/hospital policy(ies)/standards     Problem: Skin/Tissue Integrity - Adult  Goal: Skin integrity remains intact  Description: INTERVENTIONS  1. Assess and document risk factors for pressure ulcer development  2. Assess and document skin integrity  3. Monitor for areas of redness and/or skin breakdown  4. Initiate pressure ulcer prevention measures as indicated  Outcome: Progressing  Flowsheets (Taken 7/8/2022 0438 by Corinne J Ader, RN)  Skin integrity remains intact:   Assess and document risk factors for pressure ulcer development   Monitor for areas of redness and/or skin breakdown   Assess and document skin integrity     Problem: Musculoskeletal - Adult  Goal: Return mobility  to safest level of function  Description: INTERVENTIONS:  1. Assess patient stability and activity tolerance for standing, transferring and ambulating w/ or w/o assistive devices  2. Assist with transfers and ambulation using safe practices  3. Ensure adequate protection for wounds/incisions during mobilization  4. Obtain PT/OT and other consults as needed  5. Apply Continuous Passive Motion per order to increase flexion toward goal  6. Instruct patient/family in ordered activity level  Outcome: Progressing  Flowsheets (Taken 7/8/2022 0438 by Corinne J Ader, RN)  Return mobility to safest level of function:   Assist with transfers and ambulation using safe practices   Assess patient stability and activity tolerance for standing, transferring and ambulating with or without assistive devices  Goal: Maintain proper alignment of affected body part  Description: INTERVENTIONS:  1. Support and protect limb and body alignment per provider order  2. Instruct and reinforce with patient and family use of appropriate assistive device and precautions (e.g. spinal or hip dislocation precautions)  Outcome: Progressing  Flowsheets (Taken 7/7/2022 0048 by Corinne J Ader, RN)  Maintain proper alignment of affected body part: Support and protect limb and body alignment per provider's orders
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
## 2022-07-10 NOTE — NURSING END OF SHIFT
Nursing End of Shift Summary:    Patient: Ralph Giron  MRN: [id number]  : 1950, Age: 72 y.o.    Location: [place] [address]    Nursing Goals  Clinical Goals for the Shift: keep safe, pain control    Narrative Summary of Progress Toward Clinical Goals:  Pt safe , independent, using his IS , pain controlled with use of 5mg of oxycodone before therapies.      Barriers to Goals/Nursing Concerns:  No    New Patient or Family Concerns/Issues:  No    Shift Summary:   Significant Events & Communications to Providers (last 12 hours)     Last 5 Values    No documentation.             Oxygen Usage (last 12 hours)     Last 5 Values    No documentation.             Mobility (last 12 hours)     Last 5 Values     Row Name 07/10/22 0600 07/10/22 0834                Mobility    Activity -- Bathroom privileges;Ambulate in room;Ambulate in anne       Level of Assistance -- Independent       Patient Position Supine --       Turning Turns self Turns self                 Urethral Catheter     Active Urethral Catheter     None            Active Lines     Active Central venous catheter / Peripherally inserted central catheter / Implantable Port / Hemodialysis catheter / Midline Catheter     None              Infusing Medications   Medication Dose Last Rate     PRN Medications   Medication Dose Last Admin   • acetaminophen  325-650 mg 650 mg at 22 1156   • albuterol  2.5 mg     • alum-mag hydroxide-simeth  30 mL 30 mL at 22 0420   • benzonatate  200 mg     • cyclobenzaprine  10 mg 10 mg at 22 2100   • oxyCODONE  5-10 mg 5 mg at 07/10/22 1230   • magnesium hydroxide  30 mL     • bisacodyL  10 mg       _________________________  Kati Angel RN  07/10/22 2:15 PM

## 2022-07-10 NOTE — INTERDISCIPLINARY/THERAPY
Occupational Therapy  Treatment Note (OT)      Patient Name: Ralph Giron  Age: 72 y.o.  Gender: male    ----------------------------------------------------------------------------------------------------------------------       07/10/22 0930   Time Calculation   Start Time 0930   Stop Time 1015   Time Calculation (min) 45 min   OT Last Visit   OT Received On 07/10/22   General   Chart Reviewed Yes   OT Treatment Duration (Min) 45 Minutes   Precautions   Reinforced Precautions Yes   Subjective Comments   RN Stated patient is medically cleared for therapy Yes   Subjective Comments Pt reporting he is breathing deeper and less pain in his ribs. Pt has 2# ankle weights on for the day following his PT session.   Transfer 1   Trials/Comments 1 (I)   Ambulation 1   Comments 1 Ambulated to/from room <> rehab ~40 m each way, no AD with 2# ankle weights. Increased dyspnea. O2 86% on RA, but recovered very quickly   OT Therapeutic Groups   UE Therapeutic Exercise Completed SciFit Level 2 x 3:00 mins x 3 sets with rest break in between- O2 on RA 93%. Did RUE only   Assessment   Rehab Potential Good   Progress Progressing toward goals   Recommendations for Therapy Continue skilled therapy   Assessment Comment Pt was challenged with ankle weights for fxnl mobility and endurance. Does appear to be having less dyspnea and tolerating more activity. Good tolerance of SciFit today with increased speed noted.   Therapeutic Interventions (Time Spent in Minutes)   Therapeutic Exercise 30   Therapeutic Activity Dynamic 15   Plan   Plan Comment Shower         _________________  Jaymie Tran, OTR  07/10/22 10:20 AM
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
## 2022-07-10 NOTE — NURSING END OF SHIFT
Nursing End of Shift Summary:    Patient: Ralph Giron  MRN: [id number]  : 1950, Age: 72 y.o.    Location: [place] [address]    Nursing Goals  Clinical Goals for the Shift: pain control    Narrative Summary of Progress Toward Clinical Goals:  Pt remained free from falls this shift. PRN Paige and Flexeril in use x 1 this shift for c/o L ribcage pain. Pt uses call light appropriately to make needs known. Pt deemed independent in room with no AD. Pt did not use CPAP during NOC, pt did sleep with HOB at 45 degrees, no episodes of apnea noted.    Barriers to Goals/Nursing Concerns:  No    New Patient or Family Concerns/Issues:  No    Shift Summary:   Significant Events & Communications to Providers (last 12 hours)     Last 5 Values    No documentation.             Oxygen Usage (last 12 hours)     Last 5 Values    No documentation.             Mobility (last 12 hours)     Last 5 Values     Row Name 22                   Mobility    Patient Position Sitting        Turning Turns self                  Urethral Catheter     Active Urethral Catheter     None            Active Lines     Active Central venous catheter / Peripherally inserted central catheter / Implantable Port / Hemodialysis catheter / Midline Catheter     None              Infusing Medications   Medication Dose Last Rate     PRN Medications   Medication Dose Last Admin   • acetaminophen  325-650 mg 650 mg at 22 1156   • albuterol  2.5 mg     • alum-mag hydroxide-simeth  30 mL 30 mL at 22 0420   • benzonatate  200 mg     • cyclobenzaprine  10 mg 10 mg at 22   • oxyCODONE  5-10 mg 10 mg at 22   • magnesium hydroxide  30 mL     • bisacodyL  10 mg       _________________________  Freedom Castro RN  07/10/22 5:55 AM

## 2022-07-11 PROCEDURE — 6360000200 HC RX 636 W HCPCS (ALT 250 FOR IP): Performed by: PHYSICAL MEDICINE & REHABILITATION

## 2022-07-11 PROCEDURE — 6370000100 HC RX 637 (ALT 250 FOR IP): Performed by: PHYSICAL MEDICINE & REHABILITATION

## 2022-07-11 PROCEDURE — 97530 THERAPEUTIC ACTIVITIES: CPT

## 2022-07-11 PROCEDURE — 2590000100 HC RX 259: Performed by: PHYSICAL MEDICINE & REHABILITATION

## 2022-07-11 PROCEDURE — (BLANK) HC ROOM PRIVATE REHAB FACILITY

## 2022-07-11 PROCEDURE — 6370000100 HC RX 637 (ALT 250 FOR IP): Performed by: INTERNAL MEDICINE

## 2022-07-11 PROCEDURE — 97535 SELF CARE MNGMENT TRAINING: CPT | Mod: GO

## 2022-07-11 PROCEDURE — 97116 GAIT TRAINING THERAPY: CPT

## 2022-07-11 PROCEDURE — 99233 SBSQ HOSP IP/OBS HIGH 50: CPT | Performed by: PHYSICAL MEDICINE & REHABILITATION

## 2022-07-11 PROCEDURE — 99232 SBSQ HOSP IP/OBS MODERATE 35: CPT | Performed by: INTERNAL MEDICINE

## 2022-07-11 PROCEDURE — 97110 THERAPEUTIC EXERCISES: CPT

## 2022-07-11 RX ADMIN — ATENOLOL 50 MG: 50 TABLET ORAL at 07:46

## 2022-07-11 RX ADMIN — OXYCODONE HYDROCHLORIDE 10 MG: 5 TABLET ORAL at 07:45

## 2022-07-11 RX ADMIN — LIDOCAINE 2 PATCH: 50 PATCH TOPICAL at 07:38

## 2022-07-11 RX ADMIN — CYANOCOBALAMIN TAB 1000 MCG 1000 MCG: 1000 TAB at 07:48

## 2022-07-11 RX ADMIN — BUPROPION HYDROCHLORIDE 150 MG: 150 TABLET, EXTENDED RELEASE ORAL at 07:49

## 2022-07-11 RX ADMIN — SENNOSIDES AND DOCUSATE SODIUM 1 TABLET: 50; 8.6 TABLET ORAL at 07:46

## 2022-07-11 RX ADMIN — ENOXAPARIN SODIUM 40 MG: 100 INJECTION SUBCUTANEOUS at 15:12

## 2022-07-11 RX ADMIN — LANSOPRAZOLE 30 MG: 30 CAPSULE, DELAYED RELEASE ORAL at 07:50

## 2022-07-11 RX ADMIN — FLUTICASONE PROPIONATE 2 SPRAY: 50 SPRAY, METERED NASAL at 07:50

## 2022-07-11 RX ADMIN — PRAZOSIN HYDROCHLORIDE 6 MG: 1 CAPSULE ORAL at 21:11

## 2022-07-11 RX ADMIN — OXYCODONE HYDROCHLORIDE 5 MG: 5 TABLET ORAL at 15:12

## 2022-07-11 RX ADMIN — AMLODIPINE BESYLATE 5 MG: 5 TABLET ORAL at 07:48

## 2022-07-11 RX ADMIN — VENLAFAXINE HYDROCHLORIDE 225 MG: 150 CAPSULE, EXTENDED RELEASE ORAL at 08:00

## 2022-07-11 RX ADMIN — CHLORTHALIDONE 25 MG: 25 TABLET ORAL at 07:50

## 2022-07-11 RX ADMIN — DICYCLOMINE HYDROCHLORIDE 20 MG: 10 CAPSULE ORAL at 15:12

## 2022-07-11 RX ADMIN — OXYCODONE HYDROCHLORIDE 5 MG: 5 TABLET ORAL at 21:11

## 2022-07-11 RX ADMIN — CYCLOBENZAPRINE 10 MG: 10 TABLET, FILM COATED ORAL at 21:10

## 2022-07-11 RX ADMIN — LISINOPRIL 40 MG: 20 TABLET ORAL at 07:48

## 2022-07-11 RX ADMIN — DICYCLOMINE HYDROCHLORIDE 20 MG: 10 CAPSULE ORAL at 07:49

## 2022-07-11 RX ADMIN — DICYCLOMINE HYDROCHLORIDE 20 MG: 10 CAPSULE ORAL at 21:10

## 2022-07-11 RX ADMIN — TRAZODONE HYDROCHLORIDE 100 MG: 100 TABLET ORAL at 21:11

## 2022-07-11 RX ADMIN — ATORVASTATIN CALCIUM 20 MG: 20 TABLET, FILM COATED ORAL at 21:11

## 2022-07-11 NOTE — INTERDISCIPLINARY/THERAPY
Occupational Therapy  Treatment Note (OT)      Patient Name: Ralph Giron  Age: 72 y.o.  Gender: male    ----------------------------------------------------------------------------------------------------------------------     07/11/22 1015   General   Amount of Missed Time 45 Minutes   Missed Treatment Reason Patient ill (Comment)   Subjective Comments   Subjective Comments PT reporting pt feeling ill after breakfast and declined her session. Upon arrival, pt sleeping and did arouse. Pt declining OT session reporting he wanted to continue to sleep- he is hoping to feel better after lunch. He is unsure of why he is feeling ill, reporting taking a few bites of breakfast, taking some meds and then getting chills and feeling ill.         _________________  Jaymie Tran, OTR  07/11/22 10:26 AM

## 2022-07-11 NOTE — PLAN OF CARE
Problem: Pain - Adult  Goal: Verbalizes/displays adequate comfort level or baseline comfort level  Description: INTERVENTIONS:  1. Encourage patient to monitor pain and request interventions  2. Assess pain using the appropriate pain scale  3. Administer analgesics based on type and severity of pain and evaluate response  4. Educate/Implement non-pharmacological measures as appropriate and evaluate response  5. Consider cultural, developmental and social influences on pain and pain management  6. Notify Provider if interventions unsuccessful or patient reports new pain  Outcome: Progressing     Problem: Infection - Adult  Goal: Absence of infection during hospitalization  Description: INTERVENTIONS:  1. Assess and monitor for signs and symptoms of infection  2. Monitor lab/diagnostic results  3. Monitor all insertion sites/wounds/incisions  4. Monitor secretions for changes in amount and color  5. Administer medications as ordered  6. Educate and encourage patient and family to use good hand hygiene technique  7. Identify and educate in appropriate isolation precautions for identified infection/condition  Outcome: Progressing     Problem: Safety Adult - Fall  Goal: Free from fall injury  Description: INTERVENTIONS:    Inpatient - Please reference Cares/Safety Flowsheet under [facility] Fall Risk for interventions.  Pediatrics - Please reference Peds Daily Cares/Safety Flowsheet under [facility] Pediatric Fall Assessment Fall Bundle for interventions  LD/OB - Please reference OB Shift Screening Flowsheet under OB Fall Risk for interventions.  Outcome: Progressing     Problem: Discharge Planning  Goal: Discharge to home or other facility with appropriate resources  Description: INTERVENTIONS:  1. Identify and discuss barriers to discharge with patient and caregiver.  2. Arrange for needed discharge resources and transportation as appropriate.  3. Identify discharge learning needs (meds, wound care, etc).  4. Arrange for  interpreters to assist at discharge as needed.  5. Refer to  for coordinating discharge planning if the patient needs post-hospital services based on physician order or complex needs related to functional status, cognitive ability or social support system.  Outcome: Progressing     Problem: Cardiovascular - Adult  Goal: Maintains optimal cardiac output and hemodynamic stability  Description: INTERVENTIONS:  1. Monitor vital signs and rhythm  2. Monitor for hypotension and other signs of decreased cardiac output  3. Administer and titrate ordered vasoactive medications to optimize hemodynamic stability  4. Monitor for fluid overload/dehydration, weight gain, shortness of breath and activity intolerance  5. Monitor arterial and/or venous puncture sites for bleeding and/or hematoma  6. Assess quality of pulses, capillary refill, edema, sensation, skin color and temperature  7. Assess for signs of decreased coronary artery perfusion - ex. angina  Outcome: Progressing  Goal: Absence of cardiac dysrhythmias or at baseline  Description: INTERVENTIONS:  1. Continuous cardiac monitoring, monitor vital signs, obtain 12 lead EKG if indicated  2. Administer antiarrhythmic and heart rate control medications as ordered  3. Initiate emergency measures for life threatening arrhythmias  4. Monitor electrolytes and administer replacement therapy as ordered  Outcome: Progressing     Problem: Skin/Tissue Integrity - Adult  Goal: Skin integrity remains intact  Description: INTERVENTIONS  1. Assess and document risk factors for pressure ulcer development  2. Assess and document skin integrity  3. Monitor for areas of redness and/or skin breakdown  4. Initiate pressure ulcer prevention measures as indicated  Outcome: Progressing  Goal: Incisions, wounds, or drain sites healing without S/S of infection  Description: INTERVENTIONS  1. Assess and document risk factors for skin breakdown  2. Assess and document skin  integrity  3. Assess and document dressing/incision, wound bed, drain sites and surrounding tissue  4. Implement wound care per orders  Outcome: Progressing  Goal: Oral mucous membranes remain intact  Description: INTERVENTIONS  1. Assess oral mucosa and hygiene practices  2. Implement preventative oral hygiene regimen  3. Implement oral medicated treatments as ordered  Outcome: Progressing     Problem: Musculoskeletal - Adult  Goal: Return mobility to safest level of function  Description: INTERVENTIONS:  1. Assess patient stability and activity tolerance for standing, transferring and ambulating w/ or w/o assistive devices  2. Assist with transfers and ambulation using safe practices  3. Ensure adequate protection for wounds/incisions during mobilization  4. Obtain PT/OT and other consults as needed  5. Apply Continuous Passive Motion per order to increase flexion toward goal  6. Instruct patient/family in ordered activity level  Outcome: Progressing  Goal: Maintain proper alignment of affected body part  Description: INTERVENTIONS:  1. Support and protect limb and body alignment per provider order  2. Instruct and reinforce with patient and family use of appropriate assistive device and precautions (e.g. spinal or hip dislocation precautions)  Outcome: Progressing  Goal: Return ADL status to a safe level of function  Description: INTERVENTIONS:  1. Assess patient's ADL deficits and provide assistive devices as needed  2. Obtain PT/OT consults as needed  3. Assist and instruct patient to increase activity and self care  Outcome: Progressing     Problem: Dressings Lower Extremities  Goal: STG - Patient will complete lower body dressing  Description: With supervision demonstrating good safety awareness  Outcome: Progressing     Problem: Dressing Upper Extremities  Goal: STG - Patient will dress upper body  Description: With Min A using compensatory dressing techniques  Outcome: Progressing     Problem:  Transfers  Goal: STG - Patient will perform toilet transfer  Description: With supervision demonstrating good safety awareness  Outcome: Progressing  Goal: LTG - Patient will transfer to car  Description: With CGA demonstrating good safety awareness  Outcome: Progressing     Problem: Mobility  Goal: STG - Patient will ambulate  Description: On uneven ground (grass) SBA   Outcome: Progressing  Goal: STG Mobility Misc  Description: Amb 1000 ft in 6 minutes no AD  Outcome: Progressing     Problem: TRANSFERS  Goal: STG-Transfers Misc  Description: Sit to stand 11x in 30 seconds without use of the arms.  Outcome: Progressing     Problem: PT Misc  Goal: STG - Misc 1  Description: Pt will maintain O2 sats above 90% with all indoor mobility and light activity.  Outcome: Progressing     Problem: Balance  Goal: STG-Balance Misc  Description: Improve balance to score 54 on the Slater  Outcome: Progressing

## 2022-07-11 NOTE — NURSING END OF SHIFT
Nursing End of Shift Summary:    Patient: Ralph Giron  MRN: [id number]  : 1950, Age: 72 y.o.    Location: [place] [address]    Nursing Goals  Clinical Goals for the Shift: pt comfort and safety    Narrative Summary of Progress Toward Clinical Goals:  Pt resting thru the [facility], is indep in room with no device, cpap in use at noc, has voiced no c/o's remains safe    Barriers to Goals/Nursing Concerns:      New Patient or Family Concerns/Issues:      Shift Summary:   Significant Events & Communications to Providers (last 12 hours)     Last 5 Values    No documentation.             Oxygen Usage (last 12 hours)     Last 5 Values    No documentation.             Mobility (last 12 hours)     Last 5 Values     Row Name 07/10/22 2300                   Mobility    Turning Turns self                  Urethral Catheter     Active Urethral Catheter     None            Active Lines     Active Central venous catheter / Peripherally inserted central catheter / Implantable Port / Hemodialysis catheter / Midline Catheter     None              Infusing Medications   Medication Dose Last Rate     PRN Medications   Medication Dose Last Admin   • acetaminophen  325-650 mg 650 mg at 07/10/22 1852   • albuterol  2.5 mg     • alum-mag hydroxide-simeth  30 mL 30 mL at 22 0420   • benzonatate  200 mg     • cyclobenzaprine  10 mg 10 mg at 22 2100   • oxyCODONE  5-10 mg 5 mg at 07/10/22 1230   • magnesium hydroxide  30 mL     • bisacodyL  10 mg       _________________________  Sancho Nye RN  22 5:05 AM

## 2022-07-11 NOTE — NURSING END OF SHIFT
Nursing End of Shift Summary:    Patient: Ralph Giron  MRN: [id number]  : 1950, Age: 72 y.o.    Location: [place] [address]    Nursing Goals  Clinical Goals for the Shift: pt comfort and safety    Narrative Summary of Progress Toward Clinical Goals:  Comfort and safety was met for this patient.    Barriers to Goals/Nursing Concerns:  N    New Patient or Family Concerns/Issues:  N    Shift Summary:   Significant Events & Communications to Providers (last 12 hours)     Last 5 Values    No documentation.             Oxygen Usage (last 12 hours)     Last 5 Values    No documentation.             Mobility (last 12 hours)     Last 5 Values     Row Name 22 0647 22 0700                Mobility    Patient Position Sitting Sitting       Turning -- Turns self       Anti-Embolism Devices Applied -- Bilateral;AE calf pump                 Urethral Catheter     Active Urethral Catheter     None            Active Lines     Active Central venous catheter / Peripherally inserted central catheter / Implantable Port / Hemodialysis catheter / Midline Catheter     None              Infusing Medications   Medication Dose Last Rate     PRN Medications   Medication Dose Last Admin   • acetaminophen  325-650 mg 650 mg at 07/10/22 1852   • albuterol  2.5 mg     • alum-mag hydroxide-simeth  30 mL 30 mL at 22 0420   • benzonatate  200 mg     • cyclobenzaprine  10 mg 10 mg at 22 2100   • oxyCODONE  5-10 mg 10 mg at 22 0745   • magnesium hydroxide  30 mL     • bisacodyL  10 mg       _________________________  Christina Sinclair RN  22 1:27 PM

## 2022-07-11 NOTE — PLAN OF CARE
Problem: Pain - Adult  Goal: Verbalizes/displays adequate comfort level or baseline comfort level  Description: INTERVENTIONS:  1. Encourage patient to monitor pain and request interventions  2. Assess pain using the appropriate pain scale  3. Administer analgesics based on type and severity of pain and evaluate response  4. Educate/Implement non-pharmacological measures as appropriate and evaluate response  5. Consider cultural, developmental and social influences on pain and pain management  6. Notify Provider if interventions unsuccessful or patient reports new pain  Outcome: Progressing     Problem: Safety Adult - Fall  Goal: Free from fall injury  Description: INTERVENTIONS:    Inpatient - Please reference Cares/Safety Flowsheet under [facility] Fall Risk for interventions.  Pediatrics - Please reference Peds Daily Cares/Safety Flowsheet under [facility] Pediatric Fall Assessment Fall Bundle for interventions  LD/OB - Please reference OB Shift Screening Flowsheet under OB Fall Risk for interventions.  Outcome: Progressing     Problem: Respiratory - Adult  Goal: Achieves optimal ventilation and oxygenation  Description: INTERVENTIONS:  1. Assess for changes in respiratory status  2. Assess for changes in mentation and behavior  3. Position to facilitate oxygenation and minimize respiratory effort  4. Oxygen supplementation based on oxygen saturation or ABGs  5. Assess patient's ability to cough effectively  6. Encourage broncho-pulmonary hygiene including cough, deep breathe  7. Assess the need for suctioning   8. Assess and instruct to report SOB or any respiratory difficulty  9. Respiratory Therapy support as indicated, including medications and treatment.  Outcome: Progressing     Problem: Skin/Tissue Integrity - Adult  Goal: Skin integrity remains intact  Description: INTERVENTIONS  1. Assess and document risk factors for pressure ulcer development  2. Assess and document skin integrity  3. Monitor for areas of  redness and/or skin breakdown  4. Initiate pressure ulcer prevention measures as indicated  Outcome: Progressing     Problem: Musculoskeletal - Adult  Goal: Return mobility to safest level of function  Description: INTERVENTIONS:  1. Assess patient stability and activity tolerance for standing, transferring and ambulating w/ or w/o assistive devices  2. Assist with transfers and ambulation using safe practices  3. Ensure adequate protection for wounds/incisions during mobilization  4. Obtain PT/OT and other consults as needed  5. Apply Continuous Passive Motion per order to increase flexion toward goal  6. Instruct patient/family in ordered activity level  Outcome: Progressing  Goal: Maintain proper alignment of affected body part  Description: INTERVENTIONS:  1. Support and protect limb and body alignment per provider order  2. Instruct and reinforce with patient and family use of appropriate assistive device and precautions (e.g. spinal or hip dislocation precautions)  Outcome: Progressing  Goal: Return ADL status to a safe level of function  Description: INTERVENTIONS:  1. Assess patient's ADL deficits and provide assistive devices as needed  2. Obtain PT/OT consults as needed  3. Assist and instruct patient to increase activity and self care  Outcome: Progressing

## 2022-07-11 NOTE — INTERDISCIPLINARY/THERAPY
Physical Therapy  Treatment Note (PT)    Patient Name: Ralph Giron  Age: 72 y.o.  Gender: male    ----------------------------------------------------------------------------------------------------------------------       07/11/22 1045   Time Calculation   Start Time 1045   Stop Time 1130   Time Calculation (min) 45 min   Pain Assessment Scale   Pain Scale 0-10   0-10 Pain Score 9   Patient's Stated Pain Goal No pain   Has Pain Adversely Affected Your Usual Activity, Sleep, Mood or Stress in the Last 24 Hours? Yes   How Has Pain Adversely Affected You? Decreased mobility   Pain Type Acute pain   Pain Location Rib cage   Pain Orientation Posterior;Left   Pain Descriptors Aching;Dull   Pain Frequency Constant/continuous   General   Is this a Co-Treatment? No   Precautions   Reinforced Precautions Yes   Weight Bearing Precautions Yes   LUE Weight Bearing Non Weight Bearing (NWB)  (LUE)   Subjective Comments   RN Stated patient is medically cleared for therapy Yes   Bed Mobility   Bed Mobility Assessed   Bed Mobility 1   Level of Assistance 1 Independent   Bed Mobility Comments 1 Supine to sit with independence. Maintained NWB of LUE. No verbal cues reuqired.   Transfer 1   Level of Assistance 1 Independent   Trials/Comments 1 Sit<>stand transfers from chair with armrests and chair without armrests with independence. No verbal cues required. Maintained NWB of LUE. No assistive device.   Transfers 2   Level of Assistance 2 Independent   Trials/Comments 2 Stand-pivot-sit transfers with no assistive device and independence. No verbal cues required. Maintained NWB of LUE. No unsteadiness nor LOB observed.   Ambulation   Ambulation Assessed   Weight Bearing Precautions   LLE Weight Bearing Non Weight Bearing (NWB)   Ambulation 1   Surface 1 Level surface;Smooth   Device 1 No device   Assistance 1 Independent   Ambulation Distance (meters) 300 meters   Comments 1 300 meters x1 in 6 minutes with no assistive device and  independence. No unsteadiness nor LOB observed. Slight SOB following with O2 saturation at 90% following during seated therapeutic rest break.   Ambulation 2   Surface 2 Uneven;Outdoors   Device 2 No device   Ambulation Distance (meters) 50 meters   Comments 2 50 meters x2 on uneven/outdoor surface (grass) with no assistive device and independence. No verbal cues required. No unsteadiness nor LOB observed.   Stairs 1   Rails 1 Right   Device 1 No device   Assistance 1 Independent   Comment/# Steps 1 12 stairs x1 with right hand rail on ascent, maintained NWB of LUE throughout. Minimal unsteadiness present on descent x1 and patient was able to self-correct. No LOB observed throughout. Demonstrates reciprocal pattern on ascent and descent.   QI Functional Abilities and Goals: Mobility   Roll Left and Right Independent - 06   Sit to Lying Independent - 06   Lying to Sitting on Side of Bed Independent - 06   Sit to Stand Independent - 06   Chair/Bed-to-Chair Transfer Independent - 06   Walk 10 Feet Independent - 06   Walk 50 Feet with Two Turns Independent - 06   Walk 150 Feet Independent - 06   Walking 10 Feet on Uneven Surfaces Independent - 06   1 Step (Curb) Independent - 06   4 Steps Independent - 06   12 Steps Independent - 06   Picking Up Object Independent - 06   Tests   Functional Tests 30 second sit<>stand = 14 reps with no arms.   Activity Tolerance   Activity Tolerance Comments Demonstrates good tolerance to therapy session with slight SOB observed throughout, maintained O2 saturation greater than or equal to 90% on room air following all functional tasks/assessments throughout PT session with seated therapeutic rest breaks provided as needed to improve overall tolerance.   Therapeutic Interventions (Time Spent in Minutes)   Gait/Mobility 35   Therapeutic Activity 10         _________________  Tommy Perrin, PT  07/11/22 11:50 AM
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
## 2022-07-11 NOTE — PLAN OF CARE
Problem: Mobility  Goal: STG - Patient will ambulate  Description: On uneven ground (grass) SBA   Outcome: Completed  Note: Patient is independent on uneven/outdoor surfaces for 50 meters with no AD while maintaining NWB of LUE.     Problem: Mobility  Goal: [facility] Mobility Misc  Description: Amb 1000 ft in 6 minutes no AD  Outcome: Completed  Note: Patient ambulated 300 meters in 6 minutes with no assistive device and independence. No unsteadiness nor LOB observed throughout. Maintained NWB of LUE.      Problem: TRANSFERS  Goal: [facility]-Transfers Misc  Description: Sit to stand 11x in 30 seconds without use of the arms.  Outcome: Completed  Note: Patient completed 14 sit<>stands in 30 seconds with no use of his arms and independence.      Problem: PT Misc  Goal: [facility] - Misc 1  Description: Pt will maintain O2 sats above 90% with all indoor mobility and light activity.  Outcome: Completed  Note: Patient's 02 saturation was assessed throughout PT session following all therapeutic interventions/assessments and maintain equal to or greater than a 90% saturation on room air.      Problem: Balance  Goal: [facility]-Balance Misc  Description: Improve balance to score 54 on the [place]  Outcome: Completed  Note: Patient achieved a score of 54/56 on the CASTRO balance assessment.

## 2022-07-11 NOTE — NURSING END OF SHIFT
Nursing End of Shift Summary:    Patient: Ralph Giron  MRN: [id number]  : 1950, Age: 72 y.o.    Location: [place] [address]    Nursing Goals  Clinical Goals for the Shift: pt comfort and safety    Narrative Summary of Progress Toward Clinical Goals:  Pt comfort maintained, free of falls, assist prn with activity. Pt independent with cares    Barriers to Goals/Nursing Concerns:  no    New Patient or Family Concerns/Issues:  no    Shift Summary:   Significant Events & Communications to Providers (last 12 hours)     Last 5 Values    No documentation.             Oxygen Usage (last 12 hours)     Last 5 Values    No documentation.             Mobility (last 12 hours)     Last 5 Values     Row Name 07/10/22 1700                   Mobility    Activity Bathroom privileges;Up ad surjit        Level of Assistance Independent                  Urethral Catheter     Active Urethral Catheter     None            Active Lines     Active Central venous catheter / Peripherally inserted central catheter / Implantable Port / Hemodialysis catheter / Midline Catheter     None              Infusing Medications   Medication Dose Last Rate     PRN Medications   Medication Dose Last Admin   • acetaminophen  325-650 mg 650 mg at 07/10/22 1852   • albuterol  2.5 mg     • alum-mag hydroxide-simeth  30 mL 30 mL at 22 0420   • benzonatate  200 mg     • cyclobenzaprine  10 mg 10 mg at 22 2100   • oxyCODONE  5-10 mg 5 mg at 07/10/22 1230   • magnesium hydroxide  30 mL     • bisacodyL  10 mg       _________________________  Veronica Aguirre RN  07/10/22 9:57 PM

## 2022-07-11 NOTE — INTERDISCIPLINARY/THERAPY
"Physical Therapy  Treatment Note (PT)    Patient Name: Ralph Giron  Age: 72 y.o.  Gender: male    ----------------------------------------------------------------------------------------------------------------------       07/11/22 0830   General   Amount of Missed Time (min) 45 Minutes   Missed Time Reason Patient ill (Comment)   Subjective Comments   Subjective Comments Upon initial approach to patient he was asleep in bed with CPAP donned. Patient reported that he was not feeling well and after seeing OT reported getting a \"chill\" and fell back asleep after attempting to each breakfast. Patient was intitially agreeable to attempting PT but upon attempting to get out of bed, patient refused stating he wasn't feeling well enought to try at this time. Patient was agreeable to attempting PT at a later time.         _________________  Tommy Perrin, PT  07/11/22 10:28 AM  "

## 2022-07-11 NOTE — INTERDISCIPLINARY/THERAPY
Physical Therapy  Treatment Note (PT)    Patient Name: Ralph Giron  Age: 72 y.o.  Gender: male    ----------------------------------------------------------------------------------------------------------------------       07/11/22 1230   Time Calculation   Start Time 1230   Stop Time 1315   Time Calculation (min) 45 min   Pain Assessment Scale   Pain Scale 0-10   0-10 Pain Score 2   Patient's Stated Pain Goal No pain   Has Pain Adversely Affected Your Usual Activity, Sleep, Mood or Stress in the Last 24 Hours? Yes   How Has Pain Adversely Affected You? Decreased mobility   Pain Type Acute pain   Pain Location Rib cage   Pain Orientation Left;Posterior   Pain Descriptors Aching;Dull   Pain Frequency Constant/continuous   Pain Interventions Compression  (Utilized ACE wrap to add additional compression to the ribs with patient reporting increased comfort and relief.)   General   Is this a Co-Treatment? No   Precautions   Reinforced Precautions Yes   Weight Bearing Precautions Yes   LUE Weight Bearing Non Weight Bearing (NWB)  (LUE)   Subjective Comments   RN Stated patient is medically cleared for therapy Yes   Transfers   Transfer Assessed   Transfer 1   Level of Assistance 1 Independent   Trials/Comments 1 Sit<>stand transfers from chair with armrests and chair without armrests with independence. No verbal cues required. Maintained NWB of LUE. No assistive device.   Transfers 2   Level of Assistance 2 Independent   Trials/Comments 2 Stand-pivot-sit transfers with no assistive device and independence. No verbal cues required. Maintained NWB of LUE. No unsteadiness nor LOB observed.   Transfers 3   Level of Assistance 3 Independent   Trials/Comments 3 Toileting transfer with independence. No verbal cues required. Maintained NWB of LUE throughout. No unsteadiness nor LOB observed throughout.   Weight Bearing Precautions   LLE Weight Bearing Non Weight Bearing (NWB)  (LUE)   Ambulation 1   Surface 1 Level  surface;Smooth   Device 1 No device   Assistance 1 Independent   Comments 1 50 meters x2 as well as within patient's room with no AD and independence. No verbal cues required, no unsteadiness nor LOB observed. Demonstrates improved alivia with minimal SOB following increased distances. Monitored patient's O2 saturation throughout and was greater than 90% on room air. Maintained NWB of LUE throughout.   QI Functional Abilities and Goals: Mobility   Toilet Transfer Independent - 06   QI Functional Abilities and Goals: Self-Care   Toileting Hygiene Independent - 06   Lower Body Dressing Independent - 06   Toileting   Toileting Level of Assistance Independent   Where Assessed Toilet   Toileting Comments Patient was independent with toileting hygiene and clothing management.   Equipment Use   Recumbent Stepper Patient completed 5 minutes x3 trials on the recumbent stepper at level 1 resistance utilizing BLEs only averaging 450 steps over the 5 minutes during each trial. Patient took up to 2 minute seated therapeutic rest breaks following each attempt at 5 minutes to reduce SOB and improve overall tolerance. No changes in pain nor other complaints reported throughout. Maintained O2 saturation greater than or equal to 90% on room air.   Activity Tolerance   Activity Tolerance Comments Demonstrates good tolerance to therapy session with continued slight SOB observed throughout, maintaining O2 saturation greater than 90% on room air throughout with occasional seated therapeutic rest breaks throughout to improve overall tolerance. Provided patient with a printed HEP, verbally reviewed all exercises as well addressing all questions and concerns. HEP was as follows: Access Code: [email or web address]  URL: [email or web address]  Date: 07/11/2022  Prepared by: Tommy Perrin    Program Notes  Remember to maintain non-weight bearing on the left arm until otherwise specified by your physician!      Exercises  Standing March with  Counter Support - 1 x daily - 7 x weekly - 3 sets - 10 reps  Mini Squat with Counter Support - 1 x daily - 7 x weekly - 3 sets - 10 reps  Standing Hip Abduction with Counter Support - 1 x daily - 7 x weekly - 3 sets - 10 reps  Heel rises with counter support - 1 x daily - 7 x weekly - 3 sets - 10 reps   Therapeutic Interventions (Time Spent in Minutes)   Gait/Mobility 10   Therapeutic Activity 15   Therapeutic Exercise 20         _________________  Tommy Perrin, PT  07/11/22 2:07 PM

## 2022-07-11 NOTE — INTERDISCIPLINARY/THERAPY
Progress Note: CM met with pt at bedside, introduced self and review for Discharge plan.  CM updates pt the tentative date is tomorrow 7/12 and pt is pleased.  CM reviews Pharmacy and pt desires [facility]; medical appointments that have been scheduled to include his PCP 7/20 and await feedback from Ortho/trauma for staples.  Pt has no concerns at this time.  CM will continue to follow for DISPO planning

## 2022-07-11 NOTE — DISCHARGE INSTRUCTIONS
"Physical Therapy: Good luck to you, Ralph! It was a pleasure getting to know you and work with you during your stay here with us. I appreciate all of your hard work! Remember to continue with the standing exercises as well as maintaining non-weight bearing of your left arm until otherwise specified by your doctors! Keep up the hard work, I know you'll do well. Also remember to use your cane as you feel necessary to improve your overall comfort, stability and endurance.     Occupational Therapy: Great meeting you! Remember in mid August you can start rehabbing your left arm. Start with just gaining motion back by doing some of those same exercises you did following your shoulder surgery. Wait until you have full motion before starting to strengthen the arm. You can find your right arm exercises to do with the theraband behind the \"Occupational Therapy\" tab in your binder. Do those a few times a week. Use your built in shower chair when you first transition home to ensure good balance and safety. Remember to slow down and listen to your body. Good luck! Porsha OT  "

## 2022-07-11 NOTE — INTERDISCIPLINARY/THERAPY
Occupational Therapy  Treatment Note (OT)      Patient Name: Ralph Giron  Age: 72 y.o.  Gender: male    ----------------------------------------------------------------------------------------------------------------------       07/11/22 0700   Time Calculation   Start Time 0700   Stop Time 0745   Time Calculation (min) 45 min   OT Last Visit   OT Received On 07/11/22   General   Chart Reviewed Yes   OT Treatment Duration (Min) 45 Minutes   Precautions   Reinforced Precautions Yes   Subjective Comments   RN Stated patient is medically cleared for therapy Yes   Subjective Comments Pt ready and waiting for shower. Reporting no concerns over night, pain is managed. Pt is anxious to go home and feels ready.   Transfer 1   Trials/Comments 1 (I) in room, no AD   Ambulation 1   Comments 1 Ambulates in room, no AD (I)ly   QI Functional Abilities and Goals: Mobility   Toilet Transfer Independent - 06   Car Transfers Patient refused - 07  (Pt declined wanting to do car xfer- he reports no concerns with it and has been doing xfer (I)ly in room)   Grooming   Grooming Level of Assistance Independent   Bathing   Bathing Patient Completed Right arm;Right upper leg;Right lower leg;Left arm;Left upper leg;Left lower leg;Chest;Abdomen;Layla area;Buttocks   Bathing Level of Assistance Setup   Bathing Where Assessed Shower   Bathing Comments 10/10 parts sitting on shower chair using [facility]. Set up assist only. Pt agreeable he will use his shower bench in the RV with initial showering at home   UE Dressing   UE Dressing Level of Assistance Independent   LE Dressing   Pants Level of Assistance Independent   Sock Level of Assistance Independent   Shoe Level of Assistance Independent   Adult Briefs Level of Assistance Independent   QI Functional Abilities and Goals: Self-Care   Eating  Independent - 06   Oral Hygiene Independent - 06   Toileting Hygiene Independent - 06   Shower/ Bathe Self Setup or clean-up assistance - 05   Upper Body  Dressing Independent - 06   Lower Body Dressing Independent - 06   Putting On/ Taking Off Foorwear Independent - 06   Assessment   Rehab Potential Good   Progress Progressing toward goals   Recommendations for Therapy Continue skilled therapy   Assessment Comment Pt is ready for d/c. Pt gained confidence with his initial concerns of managing RV stairs and WIS and is completing them without concern. Pain is managed and doing better. Able to complete ADLs (I)ly and only set up for shower.   Therapeutic Interventions (Time Spent in Minutes)   ADL Selfcare Home Managment 45   Plan   Plan Comment HEP, d/c prep, JUSTINE endurance         _________________  Jaymie Tran, OTR  07/11/22 7:36 AM
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
## 2022-07-12 VITALS
WEIGHT: 235.67 LBS | RESPIRATION RATE: 18 BRPM | SYSTOLIC BLOOD PRESSURE: 141 MMHG | DIASTOLIC BLOOD PRESSURE: 65 MMHG | BODY MASS INDEX: 34.91 KG/M2 | HEART RATE: 64 BPM | HEIGHT: 69 IN | TEMPERATURE: 98.1 F | OXYGEN SATURATION: 92 %

## 2022-07-12 PROCEDURE — 99239 HOSP IP/OBS DSCHRG MGMT >30: CPT | Performed by: PHYSICAL MEDICINE & REHABILITATION

## 2022-07-12 PROCEDURE — 99080 SPECIAL REPORTS OR FORMS: CPT

## 2022-07-12 PROCEDURE — 6370000100 HC RX 637 (ALT 250 FOR IP): Performed by: INTERNAL MEDICINE

## 2022-07-12 PROCEDURE — 99232 SBSQ HOSP IP/OBS MODERATE 35: CPT | Performed by: INTERNAL MEDICINE

## 2022-07-12 PROCEDURE — 2590000100 HC RX 259: Performed by: PHYSICAL MEDICINE & REHABILITATION

## 2022-07-12 PROCEDURE — 6360000200 HC RX 636 W HCPCS (ALT 250 FOR IP): Performed by: PHYSICAL MEDICINE & REHABILITATION

## 2022-07-12 PROCEDURE — 6370000100 HC RX 637 (ALT 250 FOR IP): Performed by: PHYSICAL MEDICINE & REHABILITATION

## 2022-07-12 RX ORDER — AMLODIPINE BESYLATE 5 MG/1
5 TABLET ORAL DAILY
Qty: 30 TABLET | Refills: 0 | Status: SHIPPED | OUTPATIENT
Start: 2022-07-12 | End: 2022-08-11

## 2022-07-12 RX ORDER — OXYCODONE HYDROCHLORIDE 5 MG/1
5 TABLET ORAL EVERY 4 HOURS PRN
Qty: 5 TABLET | Refills: 0 | Status: SHIPPED | OUTPATIENT
Start: 2022-07-12 | End: 2022-07-17

## 2022-07-12 RX ADMIN — LIDOCAINE 2 PATCH: 50 PATCH TOPICAL at 08:20

## 2022-07-12 RX ADMIN — ENOXAPARIN SODIUM 40 MG: 100 INJECTION SUBCUTANEOUS at 14:57

## 2022-07-12 RX ADMIN — SENNOSIDES AND DOCUSATE SODIUM 1 TABLET: 50; 8.6 TABLET ORAL at 08:24

## 2022-07-12 RX ADMIN — AMLODIPINE BESYLATE 5 MG: 5 TABLET ORAL at 08:25

## 2022-07-12 RX ADMIN — VENLAFAXINE HYDROCHLORIDE 225 MG: 150 CAPSULE, EXTENDED RELEASE ORAL at 08:23

## 2022-07-12 RX ADMIN — BUPROPION HYDROCHLORIDE 150 MG: 150 TABLET, EXTENDED RELEASE ORAL at 08:24

## 2022-07-12 RX ADMIN — LANSOPRAZOLE 30 MG: 30 CAPSULE, DELAYED RELEASE ORAL at 08:22

## 2022-07-12 RX ADMIN — DICYCLOMINE HYDROCHLORIDE 20 MG: 10 CAPSULE ORAL at 08:25

## 2022-07-12 RX ADMIN — CYANOCOBALAMIN TAB 1000 MCG 1000 MCG: 1000 TAB at 08:24

## 2022-07-12 RX ADMIN — DICYCLOMINE HYDROCHLORIDE 20 MG: 10 CAPSULE ORAL at 14:51

## 2022-07-12 RX ADMIN — CHLORTHALIDONE 25 MG: 25 TABLET ORAL at 08:24

## 2022-07-12 RX ADMIN — ATENOLOL 50 MG: 50 TABLET ORAL at 08:23

## 2022-07-12 RX ADMIN — LISINOPRIL 40 MG: 20 TABLET ORAL at 08:22

## 2022-07-12 RX ADMIN — FLUTICASONE PROPIONATE 2 SPRAY: 50 SPRAY, METERED NASAL at 08:25

## 2022-07-12 NOTE — NURSING END OF SHIFT
Nursing End of Shift Summary:    Patient: Ralph Giron  MRN: [id number]  : 1950, Age: 72 y.o.    Location: [place] [address]    Nursing Goals  Clinical Goals for the Shift: pain control    Narrative Summary of Progress Toward Clinical Goals:  Pt resting on rounds has voiced no c/o's cpap worn remains safe    Barriers to Goals/Nursing Concerns:      New Patient or Family Concerns/Issues:      Shift Summary:   Significant Events & Communications to Providers (last 12 hours)     Last 5 Values    No documentation.             Oxygen Usage (last 12 hours)     Last 5 Values    No documentation.             Mobility (last 12 hours)     Last 5 Values     Row Name 22                Mobility    Patient Position Supine --       Turning Turns self Turns self                 Urethral Catheter     Active Urethral Catheter     None            Active Lines     Active Central venous catheter / Peripherally inserted central catheter / Implantable Port / Hemodialysis catheter / Midline Catheter     None              Infusing Medications   Medication Dose Last Rate     PRN Medications   Medication Dose Last Admin   • acetaminophen  325-650 mg 650 mg at 07/10/22 185   • albuterol  2.5 mg     • alum-mag hydroxide-simeth  30 mL 30 mL at 22 0420   • benzonatate  200 mg     • cyclobenzaprine  10 mg 10 mg at 22   • oxyCODONE  5-10 mg 5 mg at 22   • magnesium hydroxide  30 mL     • bisacodyL  10 mg       _________________________  Sancho Nye RN  22 4:44 AM
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
## 2022-07-12 NOTE — PLAN OF CARE
Problem: Musculoskeletal - Adult  Goal: Return mobility to safest level of function  Description: INTERVENTIONS:  1. Assess patient stability and activity tolerance for standing, transferring and ambulating w/ or w/o assistive devices  2. Assist with transfers and ambulation using safe practices  3. Ensure adequate protection for wounds/incisions during mobilization  4. Obtain PT/OT and other consults as needed  5. Apply Continuous Passive Motion per order to increase flexion toward goal  6. Instruct patient/family in ordered activity level  Outcome: Progressing  Goal: Maintain proper alignment of affected body part  Description: INTERVENTIONS:  1. Support and protect limb and body alignment per provider order  2. Instruct and reinforce with patient and family use of appropriate assistive device and precautions (e.g. spinal or hip dislocation precautions)  Outcome: Progressing  Goal: Return ADL status to a safe level of function  Description: INTERVENTIONS:  1. Assess patient's ADL deficits and provide assistive devices as needed  2. Obtain PT/OT consults as needed  3. Assist and instruct patient to increase activity and self care  Outcome: Progressing     Problem: PT Misc  Goal: STG - Misc 1  Description: Patient will complete all functional transfers with 4/10 pain in the ribs.  Outcome: Progressing
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
## 2022-07-12 NOTE — PLAN OF CARE
Problem: Musculoskeletal - Adult  Goal: Return mobility to safest level of function  Description: INTERVENTIONS:  1. Assess patient stability and activity tolerance for standing, transferring and ambulating w/ or w/o assistive devices  2. Assist with transfers and ambulation using safe practices  3. Ensure adequate protection for wounds/incisions during mobilization  4. Obtain PT/OT and other consults as needed  5. Apply Continuous Passive Motion per order to increase flexion toward goal  6. Instruct patient/family in ordered activity level  Outcome: Progressing  Goal: Maintain proper alignment of affected body part  Description: INTERVENTIONS:  1. Support and protect limb and body alignment per provider order  2. Instruct and reinforce with patient and family use of appropriate assistive device and precautions (e.g. spinal or hip dislocation precautions)  Outcome: Progressing  Goal: Return ADL status to a safe level of function  Description: INTERVENTIONS:  1. Assess patient's ADL deficits and provide assistive devices as needed  2. Obtain PT/OT consults as needed  3. Assist and instruct patient to increase activity and self care  Outcome: Progressing
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
## 2022-07-12 NOTE — DISCHARGE SUMMARY
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 capsule  Commonly known as: PriLOSEC           prazosin 2 mg capsule  Commonly known as: MINIPRESS           spironolactone 50 mg tablet  Commonly known as: ALDACTONE           tadalafiL 10 mg tablet  Commonly known as: CIALIS           testosterone enanthate 200 mg/mL injection  Commonly known as: DELATESTRYL           traZODone 100 mg tablet  Commonly known as: DESYREL           venlafaxine XR 75 mg 24 hr capsule  Commonly known as: EFFEXOR-XR           ZOLMitriptan 5 mg tablet  Commonly known as: ZOMIG              STOP taking these medications    gabapentin 300 mg capsule  Commonly known as: NEURONTIN        ibuprofen 600 mg tablet  Commonly known as: ADVIL,MOTRIN        mupirocin 2 % ointment  Commonly known as: BACTROBAN              Where to Get Your Medications      These medications were sent to [id number] - [place], [place]  [address] AT [address] [facility] [address] [facility]  [address], [place] [address]    Phone: [phone]   · amLODIPine 5 mg tablet     You can get these medications from any pharmacy    Bring a paper prescription for each of these medications  · oxyCODONE 5 mg immediate release tablet               Total time involved in discharge  process  35 minutes with greater than 50% of time spent face-to-face with patient  involving education and coordination of care, medication reconciliation..     Electronically signed by:  Jagdeep Elkins MD

## 2022-07-12 NOTE — NURSING END OF SHIFT
Nursing End of Shift Summary:    Patient: Ralph Giron  MRN: [id number]  : 1950, Age: 72 y.o.    Location: [place] [address]    Nursing Goals  Clinical Goals for the Shift: pain control    Narrative Summary of Progress Toward Clinical Goals:  Pt had some left rib pain relieved with oxycodone and ice.  Pt safe and independent in room.  Pt getting readied for discharge tomorrow.     Barriers to Goals/Nursing Concerns:  No    New Patient or Family Concerns/Issues:  No    Shift Summary:   Significant Events & Communications to Providers (last 12 hours)     Last 5 Values    No documentation.             Oxygen Usage (last 12 hours)     Last 5 Values    No documentation.             Mobility (last 12 hours)     Last 5 Values     Row Name 22 1600 22                Mobility    Activity Ambulate in room;Bathroom privileges --       Level of Assistance Independent --       Patient Position Sitting Supine       Turning Turns self Turns self                 Urethral Catheter     Active Urethral Catheter     None            Active Lines     Active Central venous catheter / Peripherally inserted central catheter / Implantable Port / Hemodialysis catheter / Midline Catheter     None              Infusing Medications   Medication Dose Last Rate     PRN Medications   Medication Dose Last Admin   • acetaminophen  325-650 mg 650 mg at 07/10/22 1852   • albuterol  2.5 mg     • alum-mag hydroxide-simeth  30 mL 30 mL at 22 0420   • benzonatate  200 mg     • cyclobenzaprine  10 mg 10 mg at 22   • oxyCODONE  5-10 mg 5 mg at 22   • magnesium hydroxide  30 mL     • bisacodyL  10 mg       _________________________  Kati Angel RN  22 10:51 PM

## 2022-07-12 NOTE — NURSING END OF SHIFT
Nursing End of Shift Summary:    Patient: Ralph Giron  MRN: [id number]  : 1950, Age: 72 y.o.    Location: [place] [address]    Nursing Goals  Clinical Goals for the Shift: pain control    Narrative Summary of Progress Toward Clinical Goals:  Comfort and safety was maintained for this patient.  Pt to be discharged home with his wife later in the afternoon.  Staples were removed and steri-strips were applied.    Barriers to Goals/Nursing Concerns:  N    New Patient or Family Concerns/Issues:  N    Shift Summary:   Significant Events & Communications to Providers (last 12 hours)     Last 5 Values    No documentation.             Oxygen Usage (last 12 hours)     Last 5 Values    No documentation.             Mobility (last 12 hours)     Last 5 Values     Row Name 22 0700 22 0805                Mobility    Patient Position -- Other (Comment)       Turning Turns self --       Anti-Embolism Devices Applied Bilateral;AE calf pump --                 Urethral Catheter     Active Urethral Catheter     None            Active Lines     Active Central venous catheter / Peripherally inserted central catheter / Implantable Port / Hemodialysis catheter / Midline Catheter     None              Infusing Medications   Medication Dose Last Rate     PRN Medications   Medication Dose Last Admin   • acetaminophen  325-650 mg 650 mg at 07/10/22 1852   • albuterol  2.5 mg     • alum-mag hydroxide-simeth  30 mL 30 mL at 22 0420   • benzonatate  200 mg     • cyclobenzaprine  10 mg 10 mg at 22   • oxyCODONE  5-10 mg 5 mg at 22   • magnesium hydroxide  30 mL     • bisacodyL  10 mg       _________________________  Christina Sinclair RN  22 12:38 PM

## 2022-07-13 ENCOUNTER — PATIENT OUTREACH (OUTPATIENT)
Dept: FAMILY MEDICINE | Facility: CLINIC | Age: 72
End: 2022-07-13
Payer: MEDICARE

## 2022-07-13 NOTE — PROGRESS NOTES
Ralph Giron was contacted following recent hospitalization at [facility]. The patient was discharged from the hospital on 7/12/2022 .The Discharge Summary and After Visit Summary were reviewed. The patient's main diagnosis during the hospitalization was Debility.  Followup appointment: will f/u in [facility], wife has made appointment. Any additional testing and labs will be discussed at the patient's upcoming post-hospital follow up appointment.    Transitional Care Management Follow Up (most recent)     Transitional Care Management - 07/13/22 1538        OTHER    Date of post hospital outreach 07/13/22     Contact Status Contact Done     Speaking with the Patient or Patient's Caregiver? Patient     Is the patient on the Diabetes registry? N     Were patient's home medications changed or did they have any new medications added during the hospitalization? Y     Did someone go over the discharge summary with the patient or the patient's caregiver and discuss the medications listed on it? Y     Does the patient or patient's caregiver have any questions about the medication changes? N     Patient verbalized understanding of when to contact health care provider or when to get help right away? Y     Discharge instructions reviewed with patient or patient's caregiver and all questions answered? Y     Is there a Home Health/DME Plan being enacted? Please note name of HH agency, DME vendor, contacted/received N     Does patient have psychosocial issues that might impact their health status? None     Does patient have financial barriers to care? None                  Cindy Lewis RN  July 13, 2022 3:41 PM

## 2023-09-01 NOTE — INTERDISCIPLINARY/THERAPY
I spoke with patient,  Wendel Bamberger she took the first dose of the fluconazole ordered by Dr. Sofi Delgado post EGD 8/22/23 and  was extremely dizzy, disoriented "it was really bad". Patient states symptoms have subsided at present. I advised do discontinue medication, hydrate, if sitting to stand slowly and to make sure she has something to hold onto while walking. Patient does have someone home with her. Please review advise. Occupational Therapy  Treatment Note (OT)      Patient Name: Ralph Giron  Age: 72 y.o.  Gender: male    ----------------------------------------------------------------------------------------------------------------------       07/08/22 0930   Time Calculation   Start Time 0930   Stop Time 1015   Time Calculation (min) 45 min   OT Last Visit   OT Received On 07/08/22   General   Chart Reviewed Yes   OT Treatment Duration (Min) 45 Minutes   Precautions   Reinforced Precautions Yes   Subjective Comments   RN Stated patient is medically cleared for therapy Yes   Subjective Comments Pt reporting good night sleep and pain as managed right now   Bed Mobility 1   Bed Mobility Comments 1 HOB elevated and needed Min A at trunk to coming to sitting   Transfer 1   Trials/Comments 1 bed <> stand SBA and toilet xfer with SBA to SBQC   Ambulation 1   Comments 1 Ambulated during morning ADLs with SBA using SBQC x 5m x 2   QI Functional Abilities and Goals: Mobility   Toilet Transfer Supervision or touching assistance - 04   UE Dressing   UE Dressing Level of Assistance Setup   UE Dressing Where Assessed Edge of bed   UE Dressing Comments Pt able to doff overhead shirt and with increased effort did jazmin new overhead shirt with just set up assist   LE Dressing   Pants Level of Assistance Close supervision   Shoe Level of Assistance Setup   Adult Briefs Level of Assistance Close supervision   LE Dressing Where Assessed   (toilet)   LE Dressing Comments 3/3 underwear and pants with SBA to pull over hips- increased dyspnea and needed rest breaks due to SOB. 2/2 shoes with set up   Toileting   Toileting Level of Assistance Close supervision   Where Assessed Toilet   Toileting Comments 3/3 parts with SBA for clothing management   QI Functional Abilities and Goals: Self-Care   Toileting Hygiene Supervision or touching assistance - 04   Upper Body Dressing Setup or clean-up assistance - 05   Lower Body Dressing Supervision or touching  assistance - 04   Putting On/ Taking Off Foorwear Setup or clean-up assistance - 05   Additional Activities   Additional Activities Comments Discussed concerns for home. Pt reporting biggest concern is shower and stairs. Showed OT pictures of bathroom and stair setup. Pt does have fold down shower chair and recommended pt use in his WIS. Pt also has good grab bars both at entrance of RV and within stairs into RV.   Assessment   Rehab Potential Good   Progress Progressing toward goals   Recommendations for Therapy Continue skilled therapy   Assessment Comment Much improved on ADLs today. Able to do UB dressing with set up assist and LB dressing with SBA. Toileting also SBA. Does continue to need breaks due to decreased activity tolerance. Does well with NWB to LUE.   Therapeutic Interventions (Time Spent in Minutes)   ADL Selfcare Home Managment 45   Plan   Plan Comment dynamic balance, kitchen mobility       _________________  Jaymie Tran, OTR  07/08/22 12:10 PM

## 2024-08-02 ENCOUNTER — APPOINTMENT (RX ONLY)
Dept: URBAN - METROPOLITAN AREA CLINIC 138 | Facility: CLINIC | Age: 74
Setting detail: DERMATOLOGY
End: 2024-08-02

## 2024-08-02 VITALS — WEIGHT: 231 LBS | HEIGHT: 69 IN

## 2024-08-02 DIAGNOSIS — Z71.89 OTHER SPECIFIED COUNSELING: ICD-10-CM

## 2024-08-02 DIAGNOSIS — L663 OTHER SPECIFIED DISEASES OF HAIR AND HAIR FOLLICLES: ICD-10-CM

## 2024-08-02 DIAGNOSIS — L82.1 OTHER SEBORRHEIC KERATOSIS: ICD-10-CM

## 2024-08-02 DIAGNOSIS — L73.9 FOLLICULAR DISORDER, UNSPECIFIED: ICD-10-CM

## 2024-08-02 DIAGNOSIS — L738 OTHER SPECIFIED DISEASES OF HAIR AND HAIR FOLLICLES: ICD-10-CM

## 2024-08-02 DIAGNOSIS — L71.8 OTHER ROSACEA: ICD-10-CM

## 2024-08-02 DIAGNOSIS — D22 MELANOCYTIC NEVI: ICD-10-CM

## 2024-08-02 DIAGNOSIS — D18.0 HEMANGIOMA: ICD-10-CM

## 2024-08-02 DIAGNOSIS — L57.8 OTHER SKIN CHANGES DUE TO CHRONIC EXPOSURE TO NONIONIZING RADIATION: ICD-10-CM

## 2024-08-02 DIAGNOSIS — L81.4 OTHER MELANIN HYPERPIGMENTATION: ICD-10-CM

## 2024-08-02 PROBLEM — D18.01 HEMANGIOMA OF SKIN AND SUBCUTANEOUS TISSUE: Status: ACTIVE | Noted: 2024-08-02

## 2024-08-02 PROBLEM — D22.4 MELANOCYTIC NEVI OF SCALP AND NECK: Status: ACTIVE | Noted: 2024-08-02

## 2024-08-02 PROBLEM — L02.92 FURUNCLE, UNSPECIFIED: Status: ACTIVE | Noted: 2024-08-02

## 2024-08-02 PROCEDURE — ? COUNSELING

## 2024-08-02 PROCEDURE — 99204 OFFICE O/P NEW MOD 45 MIN: CPT

## 2024-08-02 PROCEDURE — ? PRESCRIPTION

## 2024-08-02 RX ORDER — CLINDAMYCIN PHOSPHATE 10 MG/ML
SOLUTION TOPICAL
Qty: 60 | Refills: 2 | Status: ERX | COMMUNITY
Start: 2024-08-02

## 2024-08-02 RX ADMIN — CLINDAMYCIN PHOSPHATE: 10 SOLUTION TOPICAL at 00:00

## 2024-08-02 ASSESSMENT — LOCATION DETAILED DESCRIPTION DERM
LOCATION DETAILED: STERNUM
LOCATION DETAILED: RIGHT CLAVICULAR NECK
LOCATION DETAILED: HAIR
LOCATION DETAILED: SUPERIOR MID FOREHEAD
LOCATION DETAILED: NASAL DORSUM
LOCATION DETAILED: SUPERIOR THORACIC SPINE
LOCATION DETAILED: RIGHT SUPERIOR MEDIAL UPPER BACK

## 2024-08-02 ASSESSMENT — LOCATION SIMPLE DESCRIPTION DERM
LOCATION SIMPLE: RIGHT UPPER BACK
LOCATION SIMPLE: SUPERIOR FOREHEAD
LOCATION SIMPLE: HAIR
LOCATION SIMPLE: UPPER BACK
LOCATION SIMPLE: RIGHT ANTERIOR NECK
LOCATION SIMPLE: NOSE
LOCATION SIMPLE: CHEST

## 2024-08-02 ASSESSMENT — LOCATION ZONE DERM
LOCATION ZONE: NECK
LOCATION ZONE: SCALP
LOCATION ZONE: TRUNK
LOCATION ZONE: NOSE
LOCATION ZONE: FACE

## 2025-01-20 ENCOUNTER — APPOINTMENT (OUTPATIENT)
Dept: URBAN - METROPOLITAN AREA CLINIC 138 | Facility: CLINIC | Age: 75
Setting detail: DERMATOLOGY
End: 2025-01-20

## 2025-01-20 DIAGNOSIS — L21.8 OTHER SEBORRHEIC DERMATITIS: ICD-10-CM | Status: INADEQUATELY CONTROLLED

## 2025-01-20 DIAGNOSIS — Z71.89 OTHER SPECIFIED COUNSELING: ICD-10-CM

## 2025-01-20 PROCEDURE — 99214 OFFICE O/P EST MOD 30 MIN: CPT

## 2025-01-20 PROCEDURE — ? PRESCRIPTION

## 2025-01-20 PROCEDURE — ? COUNSELING

## 2025-01-20 RX ADMIN — MOMETASONE FUROATE: 1 SOLUTION TOPICAL at 00:00

## 2025-01-21 RX ORDER — MOMETASONE FUROATE 1 MG/ML
SOLUTION TOPICAL
Qty: 60 | Refills: 2 | Status: ERX | COMMUNITY
Start: 2025-01-20

## 2025-02-13 ENCOUNTER — APPOINTMENT (OUTPATIENT)
Dept: URBAN - METROPOLITAN AREA CLINIC 138 | Facility: CLINIC | Age: 75
Setting detail: DERMATOLOGY
End: 2025-02-13

## 2025-02-13 DIAGNOSIS — L21.8 OTHER SEBORRHEIC DERMATITIS: ICD-10-CM

## 2025-02-13 DIAGNOSIS — L56.5 DISSEMINATED SUPERFICIAL ACTINIC POROKERATOSIS (DSAP): ICD-10-CM

## 2025-02-13 PROCEDURE — ? PRESCRIPTION

## 2025-02-13 PROCEDURE — ? COUNSELING

## 2025-02-13 PROCEDURE — 99214 OFFICE O/P EST MOD 30 MIN: CPT

## 2025-02-13 PROCEDURE — ? TREATMENT REGIMEN

## 2025-02-13 RX ORDER — KETOCONAZOLE 20 MG/ML
SHAMPOO, SUSPENSION TOPICAL
Qty: 120 | Refills: 6 | Status: ERX | COMMUNITY
Start: 2025-02-13

## 2025-02-13 RX ADMIN — KETOCONAZOLE: 20 SHAMPOO, SUSPENSION TOPICAL at 00:00

## 2025-02-13 ASSESSMENT — LOCATION ZONE DERM: LOCATION ZONE: LEG

## 2025-02-13 ASSESSMENT — LOCATION SIMPLE DESCRIPTION DERM: LOCATION SIMPLE: RIGHT CALF

## 2025-02-13 ASSESSMENT — LOCATION DETAILED DESCRIPTION DERM: LOCATION DETAILED: RIGHT DISTAL CALF

## (undated) DEVICE — CLIP LIGA MED HORIZON BLU 2200

## (undated) DEVICE — POUCH PLASTIC INSTRUMENT @

## (undated) DEVICE — GLOVE BIOGEL PI INDICATOR SIZE 8.0 UNDERGLOVE 0

## (undated) DEVICE — BIT DRILL QC 2.0 125MM

## (undated) DEVICE — DRAPE U FABRIC LG 77X122" 6 X 46" SPLIT TIBURON

## (undated) DEVICE — DRAPE SPLIT BODY 88"X116.5" ORTHOPEDIC

## (undated) DEVICE — DRAPE C-ARM 3M 60X42

## (undated) DEVICE — DRESSING MEPILEX POSTOP AG 4X8 MEPILEX BORDER

## (undated) DEVICE — GLOVE BIOGEL PI IND 6.5 SURGICAL

## (undated) DEVICE — WAX BONE W31G

## (undated) DEVICE — PENCIL EVACUATION SLIM SMOKE PLUMEPEN ULTRA

## (undated) DEVICE — SUTURE VICRYL 0 CT-1

## (undated) DEVICE — PREP SKIN CHLORAPREP ORNG 26ML STL

## (undated) DEVICE — GLOVE BIOGEL PI ORTHOPRO 7.5

## (undated) DEVICE — TRAY UPPER EXTREMITY CUST RCRH CUSTOM PACK

## (undated) DEVICE — KIT ROOM TURNOVER STANDARD 01C INFECTION CONTROL SYSTEM

## (undated) DEVICE — DRAPE STERI U-DRAPE

## (undated) DEVICE — GLOVE SENSICARE MICRO PF 6.5

## (undated) DEVICE — SUTURE VICRYL 3-0 SH CR8 18" UNDYED

## (undated) DEVICE — SUTURE VICRYL 2-0 CT-1 36" UNDYED

## (undated) DEVICE — PAD BOVIE ADULT 9' CORD REM ELECTRODE PATIENT RETURN

## (undated) DEVICE — GLOVE SENSICARE 6.5 SURG

## (undated) DEVICE — DRAPE MED SURG 3/4 SHEET 60X76 TIBURON STL 60X76

## (undated) DEVICE — BIT DRILL FLUTED 2.7MM 125MM

## (undated) DEVICE — COVER MAYO STAND XLG REINFORCE 30 1/2 X 57

## (undated) DEVICE — STAPLER SKIN WIDE 35